# Patient Record
Sex: MALE | Race: WHITE | NOT HISPANIC OR LATINO | Employment: OTHER
[De-identification: names, ages, dates, MRNs, and addresses within clinical notes are randomized per-mention and may not be internally consistent; named-entity substitution may affect disease eponyms.]

---

## 2017-05-26 ENCOUNTER — HEALTH MAINTENANCE LETTER (OUTPATIENT)
Age: 22
End: 2017-05-26

## 2019-09-21 ENCOUNTER — HOSPITAL ENCOUNTER (EMERGENCY)
Facility: CLINIC | Age: 24
Discharge: HOME | End: 2019-09-21
Attending: EMERGENCY MEDICINE | Admitting: EMERGENCY MEDICINE
Payer: MEDICAID

## 2019-09-21 VITALS
DIASTOLIC BLOOD PRESSURE: 83 MMHG | TEMPERATURE: 97.9 F | RESPIRATION RATE: 16 BRPM | OXYGEN SATURATION: 100 % | SYSTOLIC BLOOD PRESSURE: 119 MMHG

## 2019-09-21 DIAGNOSIS — F41.1 GENERALIZED ANXIETY DISORDER: ICD-10-CM

## 2019-09-21 DIAGNOSIS — F20.9 SCHIZOPHRENIA, UNSPECIFIED TYPE (H): ICD-10-CM

## 2019-09-21 LAB
AMPHETAMINES UR QL: NOT DETECTED NG/ML
BARBITURATES UR QL SCN: NOT DETECTED NG/ML
BENZODIAZ UR QL SCN: NOT DETECTED NG/ML
BUPRENORPHINE UR QL: NOT DETECTED NG/ML
CANNABINOIDS UR QL: NOT DETECTED NG/ML
COCAINE UR QL SCN: NOT DETECTED NG/ML
D-METHAMPHET UR QL: NOT DETECTED NG/ML
METHADONE UR QL SCN: NOT DETECTED NG/ML
OPIATES UR QL SCN: NOT DETECTED NG/ML
OXYCODONE UR QL SCN: NOT DETECTED NG/ML
PCP UR QL SCN: NOT DETECTED NG/ML
PROPOXYPH UR QL: NOT DETECTED NG/ML
TRICYCLICS UR QL SCN: NOT DETECTED NG/ML

## 2019-09-21 PROCEDURE — 99284 EMERGENCY DEPT VISIT MOD MDM: CPT | Mod: Z6 | Performed by: EMERGENCY MEDICINE

## 2019-09-21 PROCEDURE — 99285 EMERGENCY DEPT VISIT HI MDM: CPT | Mod: 25 | Performed by: EMERGENCY MEDICINE

## 2019-09-21 PROCEDURE — 80306 DRUG TEST PRSMV INSTRMNT: CPT | Performed by: EMERGENCY MEDICINE

## 2019-09-21 PROCEDURE — 90791 PSYCH DIAGNOSTIC EVALUATION: CPT

## 2019-09-21 RX ORDER — LORAZEPAM 1 MG/1
1 TABLET ORAL AT BEDTIME
Qty: 7 TABLET | Refills: 0 | Status: SHIPPED | OUTPATIENT
Start: 2019-09-21 | End: 2019-09-24

## 2019-09-21 RX ORDER — RISPERIDONE 1 MG/1
1 TABLET ORAL 2 TIMES DAILY
Qty: 60 TABLET | Refills: 0 | Status: SHIPPED | OUTPATIENT
Start: 2019-09-21 | End: 2019-11-20

## 2019-09-21 ASSESSMENT — ENCOUNTER SYMPTOMS
FEVER: 0
ABDOMINAL PAIN: 0
SHORTNESS OF BREATH: 0

## 2019-09-21 NOTE — ED NOTES
Patient took his Vraylar 1.5 mg tablet in front of Provider.  Meds secured with other patient belongings.

## 2019-09-21 NOTE — ED PROVIDER NOTES
"  History     Chief Complaint   Patient presents with     Hallucinations     HPI  Joo Serrato is a 24 year old male who presents for mental health assessment.  The patient reports that he has been hearing voices.  Most of the voices are directed towards being very negative towards him and \"beat him down\".  At times they are advising him to harm himself.  He started having difficulty with anxiety with auditory hallucinations in 2016.  His mother Kathia who is in attendance reports that there is a strong family history for depression, bipolar disorder, schizophrenia.  These conditions affect multiple siblings of Odell.  The patient has been seen recently in [facility].  This was on September 16.  During that visit he also reported intense mood swings eventually discharged from the emergency department with instructions to follow-up with primary care provider in [place] and seek outpatient counseling.    Additional stressors include patient recently moving from [place] about 3 weeks ago.  On the way home he actually stopped in a hospital in [place] they were concerned about psychoses and started him on Cariprazine (second generation atypical antipsychotic).  He has been taking a dose of 1.5 mg daily.  He was never advised to increase to therapeutic dosing of 3 mg daily.  He states that there is been a slight improvement with diminished urges for of self-harm.  Has had no intolerance to medication.  He has been compliant taking it daily.  Patient does occasionally use marijuana.  He is used it twice in the last 3weeks.  Uses no other drugs.  The patient has had no prior inpatient psychiatric care.  He has had no attempts of suicide.  Patient reports that he is seeking treatment.  Prefer outpatient treatment as he now has an appointment starting on Monday as .  He believes being unemployed is also had considerable stress.  Is currently living with his mother on a temporary " basis.    Allergies:  Allergies   Allergen Reactions     No Known Drug Allergies        Problem List:    Patient Active Problem List    Diagnosis Date Noted     Sprain of medial collateral ligament of knee 10/21/2013     Priority: Medium     Encopresis 01/29/2004     Priority: Medium     Problem list name updated by automated process. Provider to review       Urinary incontinence 01/29/2004     Priority: Medium     Problem list name updated by automated process. Provider to review          Past Medical History:    History reviewed. No pertinent past medical history.    Past Surgical History:    Past Surgical History:   Procedure Laterality Date     TONSILLECTOMY & ADENOIDECTOMY  02/13/2007       Family History:    History reviewed. No pertinent family history.    Social History:  Marital Status:  Single [1]  Social History     Tobacco Use     Smoking status: Current Every Day Smoker     Packs/day: 1.00     Smokeless tobacco: Never Used   Substance Use Topics     Alcohol use: No     Drug use: No        Medications:      Acetaminophen (TYLENOL PO)   IBUPROFEN PO   loratadine (CLARITIN) 10 MG tablet         Review of Systems   Constitutional: Negative for fever.   Respiratory: Negative for shortness of breath.    Cardiovascular: Negative for chest pain.   Gastrointestinal: Negative for abdominal pain.   All other systems reviewed and are negative.      Physical Exam   BP: 119/83  Heart Rate: 95  Temp: 97.9  F (36.6  C)  Resp: 16  SpO2: 100 %      Physical Exam   Constitutional: He is oriented to person, place, and time. He appears well-developed and well-nourished. No distress.   HENT:   Head: Normocephalic and atraumatic.   Eyes: No scleral icterus.   Neck: Normal range of motion. Neck supple.   Cardiovascular: Normal rate.   Pulmonary/Chest: Effort normal.   Neurological: He is alert and oriented to person, place, and time.   Skin: Skin is warm and dry. No rash noted. He is not diaphoretic.   Psychiatric: His speech  "is normal and behavior is normal. Thought content normal. His mood appears anxious. His affect is not angry and not blunt. He is not agitated and not actively hallucinating. Thought content is not paranoid. Cognition and memory are normal. He expresses no suicidal plans and no homicidal plans. He is attentive.   Nursing note and vitals reviewed.      ED Course        Procedures          Laboratory work completed during his [facility] emergency room visit 9/16/2019: showed troponin normal, EtOH negative, comprehensive metabolic panel was entirely normal.  CBC normal.  TSH normal.  Urine toxicology screen for amphetamines, methamphetamines, cocaine, methadone, opiates, tricyclics, oxycodone was negative.  Drug screen was positive for metabolites of marijuana.    Assessments & Plan (with Medical Decision Making)  Shayan is 24 years of age.  Since for assessment of auditory hallucinations.  Patient reports first noticing hallucinations back in 2016 during time of stress when he was going through trade school.  He has dealt with various intensity of auditory hallucinations off and on since that time.  In recent months he is been having much more profound auditory hallucinations are impacting sleep, work performance quality of life.  They state that there are voices that are constantly \"beating him down\".  He also has had voices recently returned that are prompting him to hurt himself.  He has a strong family history with multiple siblings and parent affected with schizophrenia/depression-schizoaffective disorder.  The patient in the last month was started on antipsychotic atypical second generation (Vraylar 1.5mg daily).   Patient reports he has been compliant with his medication.  Has noticed improvement but continues hearing voices.  He was not advised to increase his dose to the start of therapeutic dosing range of 3 mg daily.  Plan I spoke with Laura from DEC complete a formal psychiatric evaluation.  In " interviewing the patient he is very pleasant, cooperative and states he has no suicide plan.  He also is looking forward to starting employment as  on Monday.  He states that will be a stress reliever because he has been unemployed last month living with his mother since moving from [place] to [place].  If DEC confirms that he can be discharged home will arrange for him to follow with primary care provider.  He is working on trying to get insurance coverage.  Advised him to increase his Vraylar  to 3 mg daily.  11:59 AM  After speaking with the clinical psychologist patient be discharged home.  Is not sleeping so we will provide Ativan 1 mg #7.  He knows that this is addictive and we will not provide this long-term.  His medication that he currently is taking Vraylar is over $1500 per month.  Patient has no insurance.  Recommend transitioning to Risperidone 1 mg twice daily.  The cost of this is less than $100 per month.     I have reviewed the nursing notes.    I have reviewed the findings, diagnosis, plan and need for follow up with the patient.      New Prescriptions    No medications on file       Final diagnoses:   Schizophrenia, unspecified type (H)   Generalized anxiety disorder       9/21/2019   [facility] EMERGENCY DEPARTMENT     Stone Downey,   09/21/19 1200

## 2019-09-21 NOTE — DISCHARGE INSTRUCTIONS
As part of your discharge planning we are making arrangements for you to see a new primary clinic provider at the [facility].  This will be scheduled for early to mid next week.  Please keep this appointment.  For generalized anxiety been given a prescription for Ativan 1 mg.  This can be taken at nighttime if anxiety is preventing sleep.  You have only been given sufficient quantity for 1 week because this medication is addictive.  Your current medication that you are using for hallucinations/psychosis/schizophrenia is very expensive and I would recommend that she transition to Risperidone 1 mg twice daily.  The cost for this is less than $100 per month.     Your appointment is with Dr. Gutierrez at 3:00 on Tuesday at the [facility]

## 2019-09-21 NOTE — ED NOTES
Kai from the DEC called to find out if patient is cleared medically and ready to participate. Per MD yes to both.

## 2019-09-21 NOTE — ED AVS SNAPSHOT
[facility] Emergency Department  [address] [place] [address]  Phone:  [phone]  Fax:  [phone]                                    Joo Serrato   MRN: [id number]    Department:  [facility] Emergency Department   Date of Visit:  9/21/2019           After Visit Summary Signature Page    I have received my discharge instructions, and my questions have been answered. I have discussed any challenges I see with this plan with the nurse or doctor.    ..........................................................................................................................................  Patient/Patient Representative Signature      ..........................................................................................................................................  Patient Representative Print Name and Relationship to Patient    ..................................................               ................................................  Date                                   Time    ..........................................................................................................................................  Reviewed by Signature/Title    ...................................................              ..............................................  Date                                               Time          22EPIC Rev 08/18

## 2019-09-21 NOTE — ED TRIAGE NOTES
"Here with his Mom and reports \"I'm going insane.\"  Mom reports he has been hearing voices and they are telling him to do terrible things.  "

## 2019-09-24 ENCOUNTER — OFFICE VISIT (OUTPATIENT)
Dept: FAMILY MEDICINE | Facility: OTHER | Age: 24
End: 2019-09-24
Payer: MEDICAID

## 2019-09-24 VITALS
BODY MASS INDEX: 27.72 KG/M2 | HEIGHT: 71 IN | SYSTOLIC BLOOD PRESSURE: 120 MMHG | HEART RATE: 120 BPM | TEMPERATURE: 97.3 F | OXYGEN SATURATION: 96 % | WEIGHT: 198 LBS | DIASTOLIC BLOOD PRESSURE: 80 MMHG | RESPIRATION RATE: 18 BRPM

## 2019-09-24 DIAGNOSIS — F19.982 SLEEP PROBLEM CAUSED BY DRUG (H): Primary | ICD-10-CM

## 2019-09-24 DIAGNOSIS — R44.0 AUDITORY HALLUCINATION: ICD-10-CM

## 2019-09-24 PROCEDURE — 99203 OFFICE O/P NEW LOW 30 MIN: CPT | Performed by: FAMILY MEDICINE

## 2019-09-24 RX ORDER — LORAZEPAM 1 MG/1
1 TABLET ORAL AT BEDTIME
Qty: 10 TABLET | Refills: 0 | Status: SHIPPED | OUTPATIENT
Start: 2019-09-24 | End: 2020-10-02

## 2019-09-24 RX ORDER — TRAZODONE HYDROCHLORIDE 100 MG/1
100 TABLET ORAL AT BEDTIME
Qty: 60 TABLET | Refills: 0 | Status: SHIPPED | OUTPATIENT
Start: 2019-09-24 | End: 2019-12-02

## 2019-09-24 ASSESSMENT — PAIN SCALES - GENERAL: PAINLEVEL: NO PAIN (0)

## 2019-09-24 ASSESSMENT — MIFFLIN-ST. JEOR: SCORE: 1919.21

## 2019-09-24 NOTE — PROGRESS NOTES
"Subjective     ED/UC Followup:    Facility:  [facility]  Date of visit: 9/21/2019  Reason for visit: Schizophrenia , anxiety   Current Status: better         Joo is a 24 year old male who comes to clinic for follow-up.  He has been suffering auditory hallucinations that he describes as his own internal voice that is getting louder and more demanding.  No homicidal or suicidal tendencies.  He was seen in our ED several days ago and placed on risperidone which she is tolerating well.  This was his second visit to an ED.  The original was placed on VraYLAR.  But was too expensive and stopped.  Currently living his mother.  He is starting work soon.  He is stressed about his finances and excited about that.  He has not had psychiatric follow-up.  His auditory hallucinations have nearly resolved.      Review of Systems   ROS COMP: Constitutional, HEENT, cardiovascular, pulmonary, gi and gu systems are negative, except as otherwise noted.      Objective    /80   Pulse 120   Temp 97.3  F (36.3  C) (Temporal)   Resp 18   Ht 1.81 m (5' 11.25\")   Wt 89.8 kg (198 lb)   SpO2 96%   BMI 27.42 kg/m       Wt Readings from Last 2 Encounters:   09/24/19 89.8 kg (198 lb)   12/31/13 110.7 kg (244 lb 1.6 oz) (>99 %)*     * Growth percentiles are based on CDC (Boys, 2-20 Years) data.       Physical Exam   Well-appearing and in no distress. Mood \"okay\". Affect, mentation appropriate. Insight and judgment intact. speech normal rate and content. No evidence of neyda, SI, HI. No psychosis.            Assessment & Plan       ICD-10-CM    1. 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 Auditory hallucination R44.0 traZODone (DESYREL) 100 MG tablet     MENTAL HEALTH REFERRAL  - Adult; Psychiatry and Medication Management; Psychiatry; [facility]: [facility] Psychiatry Service [phone].  Medication management & future refills will be returned to [facility] PCP upon completion of evaluation; We samir... "       Sleep is improved.  He can stop his lorazepam.  Hallucinations have improved we will continue his risperidone.  I gave him a small prescription of trazodone to use if his sleep issues recur.  Referral was sent for local psychiatrist for clarification of his diagnosis.  I can see him back in a month, sooner as needed    Everett Gutierrez MD  [facility]

## 2019-11-19 DIAGNOSIS — F19.982 SLEEP PROBLEM CAUSED BY DRUG (H): ICD-10-CM

## 2019-11-19 DIAGNOSIS — R44.0 AUDITORY HALLUCINATION: Primary | ICD-10-CM

## 2019-11-20 RX ORDER — LORAZEPAM 1 MG/1
1 TABLET ORAL AT BEDTIME
Qty: 10 TABLET | Refills: 0 | OUTPATIENT
Start: 2019-11-20

## 2019-11-20 RX ORDER — RISPERIDONE 1 MG/1
1 TABLET ORAL 2 TIMES DAILY
Qty: 60 TABLET | Refills: 0 | Status: SHIPPED | OUTPATIENT
Start: 2019-11-20 | End: 2019-12-02

## 2019-11-20 NOTE — TELEPHONE ENCOUNTER
Pending Prescriptions Disp Refills     LORazepam (ATIVAN) 1 MG tablet 10 tablet 0     Sig: Take 1 tablet (1 mg) by mouth At Bedtime       There is no refill protocol information for this order   Last Written Prescription Date:  9/24/2019  Last Fill Quantity: 10,  # refills: 0   Last office visit: 9/24/2019 with prescribing provider:     Future Office Visit:    Sleep problem caused by drug (H) [F19.982]  - Primary     Routing refill request to provider for review/approval because:  Drug not on the [facility] refill protocol       risperiDONE (RISPERDAL) 1 MG tablet 60 tablet 0    Sig: Take 1 tablet (1 mg) by mouth 2 times daily   Routing refill request to provider for review/approval because:  Labs not current:  Lipid panel, CBC, A1c or Glucose        Cornelia Reed RN

## 2019-11-20 NOTE — TELEPHONE ENCOUNTER
Requested Prescriptions   Pending Prescriptions Disp Refills     LORazepam (ATIVAN) 1 MG tablet 10 tablet 0     Sig: Take 1 tablet (1 mg) by mouth At Bedtime       There is no refill protocol information for this order   Routing refill request to provider for review/approval              risperiDONE (RISPERDAL) 1 MG tablet 60 tablet 0     Sig: Take 1 tablet (1 mg) by mouth 2 times daily   Last Written Prescription Date:  9/21/2019  Last Fill Quantity: 60,  # refills: 0   Last office visit: 9/24/2019 with prescribing provider:  Brenda   Future Office Visit:        Antipsychotic Medications Failed - 11/19/2019 10:44 AM        Failed - Lipid panel on file within the past 12 months     No lab results found.        Failed - CBC on file in past 12 months     No lab results found.        Failed - A1c or Glucose on file in past 12 months     No lab results found.    Please review patients last 3 weights. If a weight gain of >10 lbs exists, you may refill the prescription once after instructing the patient to schedule an appointment within the next 30 days.    Wt Readings from Last 3 Encounters:   09/24/19 89.8 kg (198 lb)   12/31/13 110.7 kg (244 lb 1.6 oz) (>99 %)*   10/16/13 105.7 kg (233 lb 1.6 oz) (99 %)*     * Growth percentiles are based on CDC (Boys, 2-20 Years) data.           Passed - Blood pressure under 140/90 in past 12 months     BP Readings from Last 3 Encounters:   09/24/19 120/80   09/21/19 119/83   07/28/14 128/75           Passed - Patient is 12 years of age or older        Passed - Heart Rate on file within past 12 months     Pulse Readings from Last 3 Encounters:   09/24/19 120   07/28/14 98   12/31/13 76           Passed - Medication is active on med list        Passed - Recent (6 mo) or future (30 days) visit within the authorizing provider's specialty     Patient had office visit in the last 6 months or has a visit in the next 30 days with authorizing provider or within the authorizing provider's  "specialty.  See \"Patient Info\" tab in inbasket, or \"Choose Columns\" in Meds & Orders section of the refill encounter.          Routing refill request to provider for review/approval   Cornelia Reed RN        "

## 2019-11-21 NOTE — TELEPHONE ENCOUNTER
Everett Gutierrez MD  [facility] 15 hours ago (4:28 PM)      Needs to see psych, please help schedule. Referral is in    Routing comment      Pt will get a call to schedule the appt.   Lima Yeager MA

## 2019-12-02 ENCOUNTER — OFFICE VISIT (OUTPATIENT)
Dept: FAMILY MEDICINE | Facility: OTHER | Age: 24
End: 2019-12-02
Payer: MEDICAID

## 2019-12-02 VITALS
BODY MASS INDEX: 27.22 KG/M2 | HEART RATE: 84 BPM | WEIGHT: 201 LBS | TEMPERATURE: 97.6 F | DIASTOLIC BLOOD PRESSURE: 86 MMHG | SYSTOLIC BLOOD PRESSURE: 114 MMHG | HEIGHT: 72 IN | RESPIRATION RATE: 16 BRPM

## 2019-12-02 DIAGNOSIS — F19.982 SLEEP PROBLEM CAUSED BY DRUG (H): ICD-10-CM

## 2019-12-02 DIAGNOSIS — R44.0 AUDITORY HALLUCINATION: ICD-10-CM

## 2019-12-02 PROCEDURE — 99213 OFFICE O/P EST LOW 20 MIN: CPT | Performed by: FAMILY MEDICINE

## 2019-12-02 RX ORDER — TRAZODONE HYDROCHLORIDE 100 MG/1
100 TABLET ORAL AT BEDTIME
Qty: 30 TABLET | Refills: 0 | Status: SHIPPED | OUTPATIENT
Start: 2019-12-02 | End: 2019-12-10

## 2019-12-02 RX ORDER — LORAZEPAM 1 MG/1
1 TABLET ORAL AT BEDTIME
Qty: 10 TABLET | Refills: 0 | Status: CANCELLED | OUTPATIENT
Start: 2019-12-02

## 2019-12-02 RX ORDER — RISPERIDONE 1 MG/1
1 TABLET ORAL 2 TIMES DAILY
Qty: 60 TABLET | Refills: 0 | Status: SHIPPED | OUTPATIENT
Start: 2019-12-02 | End: 2019-12-10

## 2019-12-02 ASSESSMENT — PATIENT HEALTH QUESTIONNAIRE - PHQ9
SUM OF ALL RESPONSES TO PHQ QUESTIONS 1-9: 21
SUM OF ALL RESPONSES TO PHQ QUESTIONS 1-9: 21
10. IF YOU CHECKED OFF ANY PROBLEMS, HOW DIFFICULT HAVE THESE PROBLEMS MADE IT FOR YOU TO DO YOUR WORK, TAKE CARE OF THINGS AT HOME, OR GET ALONG WITH OTHER PEOPLE: EXTREMELY DIFFICULT

## 2019-12-02 ASSESSMENT — ANXIETY QUESTIONNAIRES
3. WORRYING TOO MUCH ABOUT DIFFERENT THINGS: NEARLY EVERY DAY
5. BEING SO RESTLESS THAT IT IS HARD TO SIT STILL: NEARLY EVERY DAY
7. FEELING AFRAID AS IF SOMETHING AWFUL MIGHT HAPPEN: NEARLY EVERY DAY
6. BECOMING EASILY ANNOYED OR IRRITABLE: NEARLY EVERY DAY
GAD7 TOTAL SCORE: 18
4. TROUBLE RELAXING: MORE THAN HALF THE DAYS
2. NOT BEING ABLE TO STOP OR CONTROL WORRYING: MORE THAN HALF THE DAYS
GAD7 TOTAL SCORE: 18
GAD7 TOTAL SCORE: 18
1. FEELING NERVOUS, ANXIOUS, OR ON EDGE: MORE THAN HALF THE DAYS
7. FEELING AFRAID AS IF SOMETHING AWFUL MIGHT HAPPEN: NEARLY EVERY DAY

## 2019-12-02 ASSESSMENT — MIFFLIN-ST. JEOR: SCORE: 1942.98

## 2019-12-02 NOTE — ASSESSMENT & PLAN NOTE
Patient is a pleasant 24-year-old with history of auditory hallucinations for the past 3 to 4 months was evaluated at the emergency department twice in the last month and was eventually started on Risperdal which seem to have helped his symptoms.  He was not able to follow-up with psychiatry due to lack of insurance but now has insurance and would like to establish with psychiatry.  He has been out of medications for the past 2 weeks which has made his symptoms worse.  Advised him to restart Risperdal and trazodone.  I will not be providing any refills on Ativan until diagnosis has been established by psychiatry.  He denies any active suicidal or homicidal thoughts.  Discussed crisis plan.

## 2019-12-02 NOTE — PROGRESS NOTES
Subjective     Joo Serrato is a 24 year old male who presents to clinic today for the following health issues:    History of Present Illness        Mental Health Follow-up:  Patient presents to follow-up on Depression & Anxiety.Patient's depression since last visit has been:  Good  The patient is having other symptoms associated with depression.  Patient's anxiety since last visit has been:  No change  The patient is having other symptoms associated with anxiety.  Any significant life events: job concerns  Patient is feeling anxious or having panic attacks.  Patient has no concerns about alcohol or drug use.     Social History  Tobacco Use    Smoking status: Current Every Day Smoker      Packs/day: 1.00    Smokeless tobacco: Never Used  Alcohol use: No  Drug use: No      Today's PHQ-9         PHQ-9 Total Score:     (P) 21   PHQ-9 Q9 Thoughts of better off dead/self-harm past 2 weeks :   (P) More than half the days   Thoughts of suicide or self harm:  (P) No   Self-harm Plan:        Self-harm Action:          Safety concerns for self or others: (P) No         He eats 0-1 servings of fruits and vegetables daily.He consumes 0 sweetened beverage(s) daily.  He is taking medications regularly.     DepressAnswers for HPI/ROS submitted by the patient on 12/2/2019   Chronic problems general questions HPI Form  If you checked off any problems, how difficult have these problems made it for you to do your work, take care of things at home, or get along with other people?: Extremely difficult  PHQ9 TOTAL SCORE: 21  NICO 7 TOTAL SCORE: 18ion and Anxiety Follow-Up    How are you doing with your depression since your last visit? No change    How are you doing with your anxiety since your last visit?  No change    Are you having other symptoms that might be associated with depression or anxiety? No    Have you had a significant life event? Job Concerns     Do you have any concerns with your use of alcohol or other drugs?  No    Social History     Tobacco Use     Smoking status: Current Every Day Smoker     Packs/day: 1.00     Smokeless tobacco: Never Used   Substance Use Topics     Alcohol use: No     Drug use: No     PHQ 12/2/2019   PHQ-9 Total Score 21   Q9: Thoughts of better off dead/self-harm past 2 weeks More than half the days   F/U: Thoughts of suicide or self-harm No   F/U: Safety concerns No     NICO-7 SCORE 12/2/2019   Total Score 18 (severe anxiety)   Total Score 18     Last PHQ-9 12/2/2019   1.  Little interest or pleasure in doing things 3   2.  Feeling down, depressed, or hopeless 2   3.  Trouble falling or staying asleep, or sleeping too much 2   4.  Feeling tired or having little energy 2   5.  Poor appetite or overeating 3   6.  Feeling bad about yourself 2   7.  Trouble concentrating 3   8.  Moving slowly or restless 2   Q9: Thoughts of better off dead/self-harm past 2 weeks 2   PHQ-9 Total Score 21   In the past two weeks have you had thoughts of suicide or self harm? No   Do you have concerns about your personal safety or the safety of others? No     In the past two weeks have you had thoughts of suicide or self-harm?  No.    Do you have concerns about your personal safety or the safety of others?   No    Suicide Assessment Five-step Evaluation and Treatment (SAFE-T)      How many servings of fruits and vegetables do you eat daily?  0-1  On average, how many sweetened beverages do you drink each day (Examples: soda, juice, sweet tea, etc.  Do NOT count diet or artificially sweetened beverages)?   0    Reviewed and updated as needed this visit by Provider  Tobacco  Allergies  Meds  Problems  Med Hx  Surg Hx  Fam Hx         Review of Systems   ROS COMP: Constitutional, HEENT, cardiovascular, pulmonary, GI, , musculoskeletal, neuro, skin, endocrine and psych systems are negative, except as otherwise noted.      Objective    /86   Pulse 84   Temp 97.6  F (36.4  C) (Temporal)   Resp 16   Ht 1.826 m  "(5' 11.89\")   Wt 91.2 kg (201 lb)   BMI 27.34 kg/m    Body mass index is 27.34 kg/m .  Physical Exam  Constitutional:       Appearance: Normal appearance.   HENT:      Head: Normocephalic and atraumatic.      Nose: Nose normal.   Neck:      Musculoskeletal: Normal range of motion and neck supple.   Cardiovascular:      Rate and Rhythm: Normal rate and regular rhythm.      Pulses: Normal pulses.      Heart sounds: Normal heart sounds. No murmur. No friction rub. No gallop.    Pulmonary:      Effort: Pulmonary effort is normal.      Breath sounds: Normal breath sounds.   Neurological:      General: No focal deficit present.      Mental Status: He is alert and oriented to person, place, and time.   Psychiatric:         Mood and Affect: Mood normal.         Behavior: Behavior normal.         Thought Content: Thought content normal.         Judgment: Judgment normal.            Diagnostic Test Results:  Labs reviewed in Epic        Assessment & Plan   Problem List Items Addressed This Visit     Auditory hallucination     Patient is a pleasant 24-year-old with history of auditory hallucinations for the past 3 to 4 months was evaluated at the emergency department twice in the last month and was eventually started on Risperdal which seem to have helped his symptoms.  He was not able to follow-up with psychiatry due to lack of insurance but now has insurance and would like to establish with psychiatry.  He has been out of medications for the past 2 weeks which has made his symptoms worse.  Advised him to restart Risperdal and trazodone.  I will not be providing any refills on Ativan until diagnosis has been established by psychiatry.  He denies any active suicidal or homicidal thoughts.  Discussed crisis plan.           Relevant Medications    risperiDONE (RISPERDAL) 1 MG tablet    traZODone (DESYREL) 100 MG tablet    Other Relevant Orders    MENTAL HEALTH REFERRAL  - Adult; Psychiatry and Medication Management; " "Psychiatry; [facility]: [facility] Psychiatry Service [phone].  Medication management & future refills will be returned to [facility] PCP upon completion of evaluation; We samir...      Other Visit Diagnoses     Sleep problem caused by drug (H)                 Tobacco Cessation:   reports that he has been smoking. He has been smoking about 1.00 pack per day. He has never used smokeless tobacco.        BMI:   Estimated body mass index is 27.34 kg/m  as calculated from the following:    Height as of this encounter: 1.826 m (5' 11.89\").    Weight as of this encounter: 91.2 kg (201 lb).           See Patient Instructions  Return in about 3 months (around 3/2/2020).    Hannah Santamaria MD  [facility]        "

## 2019-12-03 ASSESSMENT — PATIENT HEALTH QUESTIONNAIRE - PHQ9: SUM OF ALL RESPONSES TO PHQ QUESTIONS 1-9: 21

## 2019-12-03 ASSESSMENT — ANXIETY QUESTIONNAIRES: GAD7 TOTAL SCORE: 18

## 2019-12-10 ENCOUNTER — OFFICE VISIT (OUTPATIENT)
Dept: PSYCHIATRY | Facility: CLINIC | Age: 24
End: 2019-12-10
Payer: MEDICAID

## 2019-12-10 VITALS
OXYGEN SATURATION: 97 % | TEMPERATURE: 98.6 F | SYSTOLIC BLOOD PRESSURE: 124 MMHG | WEIGHT: 208.8 LBS | RESPIRATION RATE: 14 BRPM | HEART RATE: 88 BPM | DIASTOLIC BLOOD PRESSURE: 62 MMHG | BODY MASS INDEX: 28.41 KG/M2

## 2019-12-10 DIAGNOSIS — F20.9 SCHIZOPHRENIA, UNSPECIFIED TYPE (H): Primary | ICD-10-CM

## 2019-12-10 PROCEDURE — 90792 PSYCH DIAG EVAL W/MED SRVCS: CPT | Performed by: NURSE PRACTITIONER

## 2019-12-10 RX ORDER — TRAZODONE HYDROCHLORIDE 100 MG/1
100 TABLET ORAL AT BEDTIME
Qty: 30 TABLET | Refills: 2 | Status: ON HOLD | OUTPATIENT
Start: 2019-12-10 | End: 2020-10-03

## 2019-12-10 RX ORDER — RISPERIDONE 1 MG/1
1 TABLET ORAL 2 TIMES DAILY
Qty: 90 TABLET | Refills: 2 | Status: ON HOLD | OUTPATIENT
Start: 2019-12-10 | End: 2020-10-03

## 2019-12-10 ASSESSMENT — PATIENT HEALTH QUESTIONNAIRE - PHQ9
SUM OF ALL RESPONSES TO PHQ QUESTIONS 1-9: 11
5. POOR APPETITE OR OVEREATING: NEARLY EVERY DAY

## 2019-12-10 ASSESSMENT — ANXIETY QUESTIONNAIRES
GAD7 TOTAL SCORE: 16
IF YOU CHECKED OFF ANY PROBLEMS ON THIS QUESTIONNAIRE, HOW DIFFICULT HAVE THESE PROBLEMS MADE IT FOR YOU TO DO YOUR WORK, TAKE CARE OF THINGS AT HOME, OR GET ALONG WITH OTHER PEOPLE: SOMEWHAT DIFFICULT
7. FEELING AFRAID AS IF SOMETHING AWFUL MIGHT HAPPEN: MORE THAN HALF THE DAYS
5. BEING SO RESTLESS THAT IT IS HARD TO SIT STILL: MORE THAN HALF THE DAYS
2. NOT BEING ABLE TO STOP OR CONTROL WORRYING: MORE THAN HALF THE DAYS
3. WORRYING TOO MUCH ABOUT DIFFERENT THINGS: MORE THAN HALF THE DAYS
1. FEELING NERVOUS, ANXIOUS, OR ON EDGE: NEARLY EVERY DAY
6. BECOMING EASILY ANNOYED OR IRRITABLE: MORE THAN HALF THE DAYS

## 2019-12-10 ASSESSMENT — PAIN SCALES - GENERAL: PAINLEVEL: NO PAIN (0)

## 2019-12-10 NOTE — PROGRESS NOTES
"                                                         Outpatient Psychiatric Evaluation - Standard Adult    Name:  Joo Serrato  : 1995    Source of Referral:  Primary Care Provider: Hannah Santamaria MD   Last visit: 2019  Current Psychotherapist: None Currently    Identifying Data:  Patient is a 24 year old, single  White American male  who presents for initial visit with me.  Patient is currently employed full time. Patient attended the session with rowena , who they agreed to have interview with. Consent to communicate signed for Rowena patient's Mother. Consent for treatment signed and included in electronic medical record. Discussed limits of confidentiality today. My Practice Policy was reviewed and signed.     Patient prefers to be called: Joo     Chief Complaint:  \"I got to do it to continue getting my medications.\"    HPI:  Patient was initially referred to psychiatry in September.  It appears another referral was made in December.   History of auditory hallucinations for the past 3 to 4 months was evaluated at the emergency department in September and was started on Risperdal which seems to have helped his symptoms.  Apparently he was not able to follow-up with psychiatry due to lack of insurance but now has insurance.  He was seen by PCP on , at that time reported he had been out of medications for the past 2 weeks which has made his symptoms worse.  He was restarted on Risperdal and is currently taking Risperdal 1mg twice daily.     Patient denies history of mental illness.  He reports she started experiencing anxiety, paranoia, and auditory hallucinations in .  Reports voices are often negative towards him and \"beat him down\" and command in nature (telling him to harm himself).   In 2019 he moved from [place] to [place].  On the way he stopped in a hospital in [place] due to psychoses, he was started on Cariprazine/Vrylar 1.5mg.   The patient was then " "seen at [facility] on September 16.  During that visit he also reported intense mood swings eventually discharged from the emergency department.  Patient states \"I was losing my mind.\" He denies drug or alcohol use.  UDS obtained during ED visit was negative.  He reports he has been drinking since age 20 but stopped when hallucinations started.  He states he has lost two jobs, one in [place] one here in [place].  States at times experiences paranoia, \"throws me off.\" He does utilize PRN ativan.  He reports he has not experienced hallucinations for past two week.  Some ongoing paranoia although \"not too much anymore.\"  Denies depressed mood or neyda, although does seem to erica several items on PHQ9 and MDQ.  He denies decreased need for sleep, increased speech, impulsivity, increased goal directed behaviors, or irritability.    Discussed alternative treatments.      Psychiatric Review of Symptoms:  Depression: Interest: Decrease  Depressed Mood  Concentration: Decrease  Psychomotor slowing   Worthless: Increase    PHQ-9 scores:   PHQ-9 SCORE 12/2/2019 12/10/2019   PHQ-9 Total Score MyChart 21 (Severe depression) -   PHQ-9 Total Score 21 11     Neyda:  No symptoms   MDQ Score: Negative Screen  Anxiety: Feeling nervous, anxious, or on edge  Uncontrolled worrying  Worrying too much about different things  Trouble relaxing  Restlessness  Thoughts of impending doom    NICO-7 scores:    NICO-7 SCORE 12/2/2019 12/10/2019   Total Score 18 (severe anxiety) -   Total Score 18 16     Panic:  No symptoms recently, paranoia.  Agoraphobia:  No   PTSD:  No symptoms   OCD:  No symptoms   Psychosis: No symptoms   ADD / ADHD: No symptoms  Gambling or shoplifting: No   Eating Disorder:  No symptoms   Sleep:   No symptoms     Psychiatric History:     Hospitalizations: None  History of Commitment? No   Past Treatment: medication(s) from physician / PCP - Auditory hallucinations started in June.   Suicide Attempts: No   Self-injurious Behavior: " Denies  Electroconvulsive Therapy (ECT) or Transcranial Magnetic Stimulation (TMS): No   Genetic Testing: No     Substance Use History:  Social History     Tobacco Use     Smoking status: Current Every Day Smoker     Packs/day: 1.00     Smokeless tobacco: Current User     Types: Chew   Substance Use Topics     Alcohol use: No      Current/History use of drugs: Patient states that he use to drink a lot and would use marijuana occasionally. Patient currently smokes tobacco  Patient reports no problems as a result of their drinking / drug use.   Based on the clinical interview, there  are not indications of drug or alcohol abuse.  Caffeine:  Yes  1 energy drinks/day  Patient has not received chemical dependency treatment in the past  Recovery Programming Involvement: None    Past Medical History:  History reviewed. No pertinent past medical history.   Surgery:   Past Surgical History:   Procedure Laterality Date     TONSILLECTOMY & ADENOIDECTOMY  02/13/2007     Allergies:     Allergies   Allergen Reactions     No Known Drug Allergies      Primary Care Provider: Physician No Ref-Primary  Seizures or Head Injury: No  Diet: No Restrictions  Food Allergies: No   Exercise: Patient reports going to gym.   Supplements: Reviewed per Electronic Medical Record Today    Acetaminophen (TYLENOL PO),   IBUPROFEN PO,   loratadine (CLARITIN) 10 MG tablet, Take 10 mg by mouth daily  LORazepam (ATIVAN) 1 MG tablet, Take 1 tablet (1 mg) by mouth At Bedtime (Patient not taking: Reported on 12/10/2019)    No current facility-administered medications on file prior to visit.      The [place] Prescription Monitoring Program has been reviewed and there are no concerns about diversionary activity for controlled substances at this time.      Vital Signs:  Vitals: /62   Pulse 88   Temp 98.6  F (37  C) (Temporal)   Resp 14   Wt 94.7 kg (208 lb 12.8 oz)   SpO2 97%   BMI 28.41 kg/m      Labs:  Admission on 09/21/2019, Discharged on  09/21/2019   Component Date Value Ref Range Status     Cannabinoids (58-qgp-9-carboxy-9-T* 09/21/2019 Not Detected  NDET^Not Detected ng/mL Final    Cutoff for a negative cannabinoid is 50 ng/mL or less.     Phencyclidine (Phencyclidine) 09/21/2019 Not Detected  NDET^Not Detected ng/mL Final    Cutoff for a negative PCP is 25 ng/mL or less.     Cocaine (Benzoylecgonine) 09/21/2019 Not Detected  NDET^Not Detected ng/mL Final    Cutoff for a negative cocaine is 150 ng/ml or less.     Methamphetamine (d-Methamphetamine) 09/21/2019 Not Detected  NDET^Not Detected ng/mL Final    Cutoff for a negative methamphetamine is 500 ng/ml or less.     Opiates (Morphine) 09/21/2019 Not Detected  NDET^Not Detected ng/mL Final    Cutoff for a negative opiate is 100 ng/ml or less.     Amphetamine (d-Amphetamine) 09/21/2019 Not Detected  NDET^Not Detected ng/mL Final    Cutoff for a negative amphetamine is 500 ng/mL or less.     Benzodiazepines (Nordiazepam) 09/21/2019 Not Detected  NDET^Not Detected ng/mL Final    Cutoff for a negative benzodiazepine is 150 ng/ml or less.     Tricyclic Antidepressants (Desipra* 09/21/2019 Not Detected  NDET^Not Detected ng/mL Final    Cutoff for a negative tricyclic antidepressant is 300 ng/ml or less.     Methadone (Methadone) 09/21/2019 Not Detected  NDET^Not Detected ng/mL Final    Cutoff for a negative methadone is 200 ng/ml or less.     Barbiturates (Butalbital) 09/21/2019 Not Detected  NDET^Not Detected ng/mL Final    Cutoff for a negative barbituate is 200 ng/ml or less.     Oxycodone (Oxycodone) 09/21/2019 Not Detected  NDET^Not Detected ng/mL Final    Cutoff for a negative Oxycodone is 100 ng/mL or less.     Propoxyphene (Norpropoxyphene) 09/21/2019 Not Detected  NDET^Not Detected ng/mL Final    Cutoff for a negative propoxyphene is 300 ng/ml or less     Buprenorphine (Buprenorphine) 09/21/2019 Not Detected  NDET^Not Detected ng/mL Final    Cutoff for a negative buprenorphine is 10 ng/ml or  "less     Most recent labs reviewed and no new labs.     Review of Systems:  10 systems (general, cardiovascular, respiratory, eyes, ENT, endocrine, GI, , M/S, neurological) were reviewed. Denies chest pain, shortness of breath, dizziness.  Denies breast enlargement, breast tenderness or discharge. The remaining systems are all unremarkable.  Family History:   Patient reported family history includes: History reviewed. No pertinent family history.  Mental Illness History: Yes: Mother, both brothers - Bipolar Both brothers have schizophrenia. Patient states \"ADHD all that stuff\" for his brothers  strong family history for depression, bipolar disorder, schizophrenia  Substance Abuse History: Yes: Mother - alcohol  Suicide History: Denies  Medications: Yes: Many family memebers are treated for mental health.      Social History:   Birth place: [place], [place]  Childhood: Family was not intact throughout childhood.  Reported in 2016, his father was in group home for five years, mother was homeless,     Siblings: 2 brothers 2 sisters Patient is the youngest out of his siblings. Siblings have all been in senior living.  Highest education level was high school graduate and some college.   Employment History:  Self employed  Childhood illnesses: Denies  Current Living situation: Patient currently lives in  [place], [place]. Feels safe at home. Patient was in [place] for about 2 months for work. But has since moved back.   Children: zero   Firearms/Weapons Access: No: Patient denies   Service: No    Mental Status Examination:     Appearance:  awake, alert, adequately groomed and appeared stated age  Attitude:  cooperative   Eye Contact:  fair  Gait and Station: Normal  Psychomotor Behavior:  no evidence of tardive dyskinesia, dystonia, or tics and intact station, gait and muscle tone  Oriented to:  time, person, and place  Attention Span and Concentration:  Normal  Speech:  clear, coherent, regular rate, regular rhythm and poverty of " speech  Mood:  Fine  Affect:  intensity is blunted  Associations:  no loose associations  Thought Process:  logical, linear and goal oriented  Thought Content:  no evidence of suicidal ideation or homicidal ideation and no auditory hallucinations present  Recent and Remote Memory:  intact Not formally assessed. No amnesia.  Fund of Knowledge: appropriate  Insight:  good  Judgment:  intact  Impulse Control:  intact    Suicide Risk Assessment:  Today Joo Serrato denies suicidal thoughts or urges to harm self. Based on all available evidence, Joo Serrato does not appear to be at imminent risk for self-harm, does not meet criteria for a 72-hr hold, and therefore remains appropriate for ongoing outpatient level of care.  A thorough assessment of risk factors related to suicide and self-harm have been reviewed and are noted above. The patient convincingly denies acute suicidality on several occasions. Local community safety resources reviewed and printed for patient to use if needed. There was no deceit detected, and the patient presented in a manner that was believable.     DSM5  Diagnosis:  295.90  (F20.9) Schizophrenia    Medical Comorbidities Include:   Patient Active Problem List    Diagnosis Date Noted     Auditory hallucination 12/02/2019     Priority: Medium     Sprain of medial collateral ligament of knee 10/21/2013     Priority: Medium     Encopresis 01/29/2004     Priority: Medium     Problem list name updated by automated process. Provider to review       Urinary incontinence 01/29/2004     Priority: Medium     Problem list name updated by automated process. Provider to review         A 12-item WHODAS 2.0 assessment was completed by the patient today and recorded in EPIC.    WHODAS 2.0 Total Score 12/10/2019   Total Score 33       The Patient Activation Measure (JOSE EDUARDO) score was completed and recorded in [employer]. This assesses patient knowledge, skill, and confidence for self-management. No  flowsheet data found.    Impression:  Joo Serrato is a 24 year old male with recent onset of schizophrenia.  There is significant family history of schizophrenia as well as Bipolar disorder.  He is currently treated with Risperdal 1mg BID.  Hallucinations appear well treated on current dose, although he has had some intermittent paranoia.  He does utilize PRN Ativan for this.  I recommend using Risperdal 1mg PRN for psychosis/paranoia in addition to scheduled dose.  Recommend obtaining baseline labs at next appointment. He appears to be doing very well on his current dose.  We did discuss alternatives such as once daily dosing, Risperdal consta or Invega, he is not interested in any of these options at this time.  I will keep him on long term for medication management given his SPMI.    Psychotic symptoms including hallucinations, paranoia, and negative symptoms started in 2018, symptoms have persistent for at least one month.    Medication side effects and alternatives reviewed. Health promotion activities recommended and reviewed today. All questions addressed. Education and counseling completed regarding risks and benefits of medications and psychotherapy options. Collaborative Care Psychiatry Service model reviewed today. Recommend therapy for additional support.     Treatment Plan:     Continue Risperdal 1mg twice daily, will also add 1mg as needed for psychosis/paranoia.    Continue trazodone 100mg at bedtime.    Continue all other medical directions per primary care provider.     Continue all other medications as reviewed per electronic medical record today.     Safety plan reviewed. To the Emergency Department as needed or call after hours crisis line at [phone] or [phone]. [place] Crisis Text Line: Text [place] to 548055  or  Suicide LifeLine Chat: suicidepreventionlifeline.org/chat/    I am willing to see you for continuous care and medications.  I would like to see you back in three  months.    Follow up with primary care provider as planned or for acute medical concerns.    Call the psychiatric nurse line with medication questions or concerns at [phone].    MyChart may be used to communicate with your provider, but this is not intended to be used for emergencies.    Crisis Resources:    National Suicide Prevention Lifeline: 687.151.9646 (TTY: 985.960.2755). Call anytime for help.  (www.suicidepreventionlifeline.org)  National [place] on Mental Illness (www.naresh.org): 702.331.2426 or 835-980-4855.   Mental Health Association (www.mentalhealth.org): 633.784.2843 or 323-054-6265.  [place] Crisis Text Line: Text [place] to 425073  Suicide LifeLine Chat: suicidepreMaryJane Distributionline.org/chat    Administrative Billing:   Time spent with patient was 60 minutes and greater than 50% of time or 40 minutes was spent in counseling and coordination of care regarding above diagnoses and treatment plan.    Patient Status:  This is a continuous care patient and medications will be prescribed by the psychiatric provider until further indicated.    Signed:   Radha Arriaza MSN, APRN, CNP  Psychiatry

## 2019-12-10 NOTE — PATIENT INSTRUCTIONS
Treatment Plan:     Continue Risperdal 1mg twice daily and 1mg as needed for psychosis/paranoia.    Continue trazodone 100mg at bedtime.    Continue all other medical directions per primary care provider.     Continue all other medications as reviewed per electronic medical record today.     Safety plan reviewed. To the Emergency Department as needed or call after hours crisis line at [phone] or [phone]. [place] Crisis Text Line: Text [place] to 154170  or  Suicide LifeLine Chat: [employer].org/chat/    I am willing to see you for continuous care and medications.  I would like to see you back in three months.    Follow up with primary care provider as planned or for acute medical concerns.    Call the psychiatric nurse line with medication questions or concerns at [phone].    TipRanks may be used to communicate with your provider, but this is not intended to be used for emergencies.    Crisis Resources:    National Suicide Prevention Lifeline: 471.788.7847 (TTY: 242.651.4778). Call anytime for help.  (www.suicidepreventionlifeline.org)  National [place] on Mental Illness (www.naresh.org): 254.415.6631 or 724-340-5376.   Mental Health Association (www.mentalhealth.org): 257.332.1298 or 199-394-7506.  [place] Crisis Text Line: Text [place] to 549091  Suicide LifeLine Chat: suicideBitWave.org/chat

## 2019-12-11 ASSESSMENT — ANXIETY QUESTIONNAIRES: GAD7 TOTAL SCORE: 16

## 2019-12-15 DIAGNOSIS — R44.0 AUDITORY HALLUCINATION: ICD-10-CM

## 2019-12-16 RX ORDER — TRAZODONE HYDROCHLORIDE 100 MG/1
TABLET ORAL
Refills: 0 | OUTPATIENT
Start: 2019-12-16

## 2020-01-15 ENCOUNTER — DOCUMENTATION ONLY (OUTPATIENT)
Dept: PSYCHIATRY | Facility: CLINIC | Age: 25
End: 2020-01-15

## 2020-01-15 DIAGNOSIS — F20.0 PARANOID SCHIZOPHRENIA (H): Primary | ICD-10-CM

## 2020-01-15 NOTE — PROGRESS NOTES
Due to psychiatric providers resignation recommend patient be scheduled with long term psychiatry. Patient was seen by psychiatric provider on 12/10/2019, medication refills were given at that time. If medication refills are needed after 2/18/2020 please refer to PCP. [facility] please contact patient to schedule an appointment with long term psychiatry.    Radha Arriaza MSN, APRN, CNP

## 2020-07-02 ENCOUNTER — TELEPHONE (OUTPATIENT)
Dept: FAMILY MEDICINE | Facility: CLINIC | Age: 25
End: 2020-07-02

## 2020-07-02 NOTE — TELEPHONE ENCOUNTER
"Spoke to patient's sister. She is calling today looking for help for the patient. Patient has been struggling with auditory hallucinations. He has been dealing with this for quite some time however they seem to be getting worse. Melody (patient's sister) said last night and this morning the patient has been making statements such as \"I'll give all my money to someone if they shoot me in the head. I just want this over with\". Sister said they have tried they ED route and they usually just start him on a medication and send him home. Sister said none of the medication have been helping and the patient needs more help. Patient does not feel comfortable saying what is all going on at the [place] ED as he grew up in [place] and feels like he will get judged. Sister said the patient said his mind goes blank when he talks to provider so he never discloses how bad the voices are really getting.     Advised to sister that patient needs to be seen in the ED. Offered other locations she can bring him to such as [place]. Sister thinks that is a good idea. She said she will go talk to him and get him to the ED. She will call with any questions or concerns.     MARTINEZ Albert, RN  [facility]      "

## 2020-10-02 ENCOUNTER — HOSPITAL ENCOUNTER (EMERGENCY)
Facility: CLINIC | Age: 25
Discharge: ANOTHER HOSPITAL | End: 2020-10-02
Attending: PHYSICIAN ASSISTANT | Admitting: PHYSICIAN ASSISTANT
Payer: COMMERCIAL

## 2020-10-02 ENCOUNTER — HOSPITAL ENCOUNTER (INPATIENT)
Facility: CLINIC | Age: 25
LOS: 3 days | Discharge: HOME | End: 2020-10-05
Attending: PSYCHIATRY & NEUROLOGY | Admitting: PSYCHIATRY & NEUROLOGY
Payer: COMMERCIAL

## 2020-10-02 VITALS
OXYGEN SATURATION: 97 % | WEIGHT: 210 LBS | DIASTOLIC BLOOD PRESSURE: 65 MMHG | RESPIRATION RATE: 29 BRPM | SYSTOLIC BLOOD PRESSURE: 92 MMHG | BODY MASS INDEX: 28.57 KG/M2 | TEMPERATURE: 97.1 F | HEART RATE: 64 BPM

## 2020-10-02 DIAGNOSIS — J30.89 SEASONAL ALLERGIC RHINITIS DUE TO OTHER ALLERGIC TRIGGER: ICD-10-CM

## 2020-10-02 DIAGNOSIS — T14.91XA SUICIDE ATTEMPT (H): ICD-10-CM

## 2020-10-02 DIAGNOSIS — R44.0 AUDITORY HALLUCINATION: Primary | ICD-10-CM

## 2020-10-02 DIAGNOSIS — R45.851 SUICIDAL IDEATION: ICD-10-CM

## 2020-10-02 DIAGNOSIS — T50.902A OVERDOSE, INTENTIONAL SELF-HARM, INITIAL ENCOUNTER (H): ICD-10-CM

## 2020-10-02 DIAGNOSIS — R44.0 AUDITORY HALLUCINATION: ICD-10-CM

## 2020-10-02 PROBLEM — F29 PSYCHOSIS (H): Status: ACTIVE | Noted: 2020-07-03

## 2020-10-02 LAB
ALBUMIN SERPL-MCNC: 4.3 G/DL (ref 3.4–5)
ALP SERPL-CCNC: 67 U/L (ref 40–150)
ALT SERPL W P-5'-P-CCNC: 18 U/L (ref 0–70)
AMPHETAMINES UR QL: NOT DETECTED NG/ML
ANION GAP SERPL CALCULATED.3IONS-SCNC: 9 MMOL/L (ref 3–14)
APAP SERPL-MCNC: <2 MG/L (ref 10–20)
AST SERPL W P-5'-P-CCNC: 20 U/L (ref 0–45)
BARBITURATES UR QL SCN: NOT DETECTED NG/ML
BASOPHILS # BLD AUTO: 0.1 10E9/L (ref 0–0.2)
BASOPHILS NFR BLD AUTO: 0.4 %
BENZODIAZ UR QL SCN: NOT DETECTED NG/ML
BILIRUB SERPL-MCNC: 0.9 MG/DL (ref 0.2–1.3)
BUN SERPL-MCNC: 15 MG/DL (ref 7–30)
BUPRENORPHINE UR QL: NOT DETECTED NG/ML
CALCIUM SERPL-MCNC: 9.5 MG/DL (ref 8.5–10.1)
CANNABINOIDS UR QL: NOT DETECTED NG/ML
CHLORIDE SERPL-SCNC: 112 MMOL/L (ref 94–109)
CO2 SERPL-SCNC: 22 MMOL/L (ref 20–32)
COCAINE UR QL SCN: NOT DETECTED NG/ML
CREAT SERPL-MCNC: 0.95 MG/DL (ref 0.66–1.25)
D-METHAMPHET UR QL: NOT DETECTED NG/ML
DIFFERENTIAL METHOD BLD: ABNORMAL
EOSINOPHIL NFR BLD AUTO: 0.2 %
ERYTHROCYTE [DISTWIDTH] IN BLOOD BY AUTOMATED COUNT: 11.9 % (ref 10–15)
ETHANOL SERPL-MCNC: <0.01 G/DL
GFR SERPL CREATININE-BSD FRML MDRD: >90 ML/MIN/{1.73_M2}
GLUCOSE SERPL-MCNC: 80 MG/DL (ref 70–99)
HCT VFR BLD AUTO: 44.2 % (ref 40–53)
HGB BLD-MCNC: 15.4 G/DL (ref 13.3–17.7)
IMM GRANULOCYTES # BLD: 0.1 10E9/L (ref 0–0.4)
IMM GRANULOCYTES NFR BLD: 0.4 %
LYMPHOCYTES # BLD AUTO: 1.3 10E9/L (ref 0.8–5.3)
LYMPHOCYTES NFR BLD AUTO: 9.5 %
MCH RBC QN AUTO: 32.9 PG (ref 26.5–33)
MCHC RBC AUTO-ENTMCNC: 34.8 G/DL (ref 31.5–36.5)
MCV RBC AUTO: 94 FL (ref 78–100)
METHADONE UR QL SCN: NOT DETECTED NG/ML
MONOCYTES # BLD AUTO: 0.8 10E9/L (ref 0–1.3)
MONOCYTES NFR BLD AUTO: 6 %
NEUTROPHILS # BLD AUTO: 11.6 10E9/L (ref 1.6–8.3)
NEUTROPHILS NFR BLD AUTO: 83.5 %
NRBC # BLD AUTO: 0 10*3/UL
NRBC BLD AUTO-RTO: 0 /100
OPIATES UR QL SCN: NOT DETECTED NG/ML
OXYCODONE UR QL SCN: NOT DETECTED NG/ML
PCP UR QL SCN: NOT DETECTED NG/ML
PLATELET # BLD AUTO: 197 10E9/L (ref 150–450)
POTASSIUM SERPL-SCNC: 3.4 MMOL/L (ref 3.4–5.3)
PROPOXYPH UR QL: NOT DETECTED NG/ML
PROT SERPL-MCNC: 7.7 G/DL (ref 6.8–8.8)
RBC # BLD AUTO: 4.68 10E12/L (ref 4.4–5.9)
SALICYLATES SERPL-MCNC: <2 MG/DL
SARS-COV-2 RNA SPEC QL NAA+PROBE: NORMAL
SODIUM SERPL-SCNC: 143 MMOL/L (ref 133–144)
SPECIMEN SOURCE: NORMAL
TRICYCLICS UR QL SCN: ABNORMAL NG/ML
TSH SERPL DL<=0.005 MIU/L-ACNC: 1.66 MU/L (ref 0.4–4)
WBC # BLD AUTO: 13.9 10E9/L (ref 4–11)

## 2020-10-02 PROCEDURE — 80329 ANALGESICS NON-OPIOID 1 OR 2: CPT | Performed by: PHYSICIAN ASSISTANT

## 2020-10-02 PROCEDURE — 93005 ELECTROCARDIOGRAM TRACING: CPT | Performed by: FAMILY MEDICINE

## 2020-10-02 PROCEDURE — 90791 PSYCH DIAGNOSTIC EVALUATION: CPT

## 2020-10-02 PROCEDURE — 99285 EMERGENCY DEPT VISIT HI MDM: CPT | Mod: 25 | Performed by: FAMILY MEDICINE

## 2020-10-02 PROCEDURE — 80320 DRUG SCREEN QUANTALCOHOLS: CPT | Performed by: PHYSICIAN ASSISTANT

## 2020-10-02 PROCEDURE — 85025 COMPLETE CBC W/AUTO DIFF WBC: CPT | Performed by: PHYSICIAN ASSISTANT

## 2020-10-02 PROCEDURE — 250N000013 HC RX MED GY IP 250 OP 250 PS 637: Performed by: FAMILY MEDICINE

## 2020-10-02 PROCEDURE — 80306 DRUG TEST PRSMV INSTRMNT: CPT | Performed by: PHYSICIAN ASSISTANT

## 2020-10-02 PROCEDURE — 93010 ELECTROCARDIOGRAM REPORT: CPT | Performed by: INTERNAL MEDICINE

## 2020-10-02 PROCEDURE — 84443 ASSAY THYROID STIM HORMONE: CPT | Performed by: PHYSICIAN ASSISTANT

## 2020-10-02 PROCEDURE — 124N000002 HC R&B MH UMMC

## 2020-10-02 PROCEDURE — U0003 INFECTIOUS AGENT DETECTION BY NUCLEIC ACID (DNA OR RNA); SEVERE ACUTE RESPIRATORY SYNDROME CORONAVIRUS 2 (SARS-COV-2) (CORONAVIRUS DISEASE [COVID-19]), AMPLIFIED PROBE TECHNIQUE, MAKING USE OF HIGH THROUGHPUT TECHNOLOGIES AS DESCRIBED BY CMS-2020-01-R: HCPCS | Performed by: PHYSICIAN ASSISTANT

## 2020-10-02 PROCEDURE — 93005 ELECTROCARDIOGRAM TRACING: CPT

## 2020-10-02 PROCEDURE — 80053 COMPREHEN METABOLIC PANEL: CPT | Performed by: PHYSICIAN ASSISTANT

## 2020-10-02 PROCEDURE — C9803 HOPD COVID-19 SPEC COLLECT: HCPCS

## 2020-10-02 RX ORDER — QUETIAPINE FUMARATE 200 MG/1
200 TABLET, FILM COATED ORAL EVERY EVENING
Status: ON HOLD | COMMUNITY
Start: 2020-09-25 | End: 2020-10-05

## 2020-10-02 RX ORDER — ACETAMINOPHEN 325 MG/1
650 TABLET ORAL EVERY 4 HOURS PRN
Status: DISCONTINUED | OUTPATIENT
Start: 2020-10-02 | End: 2020-10-05 | Stop reason: HOSPADM

## 2020-10-02 RX ORDER — OLANZAPINE 10 MG/2ML
10 INJECTION, POWDER, FOR SOLUTION INTRAMUSCULAR DAILY PRN
Status: DISCONTINUED | OUTPATIENT
Start: 2020-10-02 | End: 2020-10-05 | Stop reason: HOSPADM

## 2020-10-02 RX ORDER — RISPERIDONE 1 MG/1
1 TABLET, ORALLY DISINTEGRATING ORAL EVERY 4 HOURS PRN
Status: DISCONTINUED | OUTPATIENT
Start: 2020-10-02 | End: 2020-10-05 | Stop reason: HOSPADM

## 2020-10-02 RX ORDER — ALUMINA, MAGNESIA, AND SIMETHICONE 2400; 2400; 240 MG/30ML; MG/30ML; MG/30ML
30 SUSPENSION ORAL EVERY 4 HOURS PRN
Status: DISCONTINUED | OUTPATIENT
Start: 2020-10-02 | End: 2020-10-05 | Stop reason: HOSPADM

## 2020-10-02 RX ORDER — RISPERIDONE 1 MG/1
1 TABLET ORAL 2 TIMES DAILY
Status: DISCONTINUED | OUTPATIENT
Start: 2020-10-02 | End: 2020-10-03

## 2020-10-02 RX ORDER — TRAZODONE HYDROCHLORIDE 100 MG/1
100 TABLET ORAL AT BEDTIME
Status: CANCELLED | OUTPATIENT
Start: 2020-10-02

## 2020-10-02 RX ORDER — QUETIAPINE FUMARATE 200 MG/1
200 TABLET, FILM COATED ORAL EVERY EVENING
Status: DISCONTINUED | OUTPATIENT
Start: 2020-10-03 | End: 2020-10-03 | Stop reason: ALTCHOICE

## 2020-10-02 RX ORDER — QUETIAPINE FUMARATE 100 MG/1
200 TABLET, FILM COATED ORAL ONCE
Status: COMPLETED | OUTPATIENT
Start: 2020-10-02 | End: 2020-10-02

## 2020-10-02 RX ORDER — LORATADINE 10 MG/1
10 TABLET ORAL DAILY
Status: DISCONTINUED | OUTPATIENT
Start: 2020-10-03 | End: 2020-10-05 | Stop reason: HOSPADM

## 2020-10-02 RX ORDER — NICOTINE 21 MG/24HR
1 PATCH, TRANSDERMAL 24 HOURS TRANSDERMAL DAILY
Status: DISCONTINUED | OUTPATIENT
Start: 2020-10-03 | End: 2020-10-05

## 2020-10-02 RX ORDER — LANOLIN ALCOHOL/MO/W.PET/CERES
3 CREAM (GRAM) TOPICAL
Status: DISCONTINUED | OUTPATIENT
Start: 2020-10-02 | End: 2020-10-05 | Stop reason: HOSPADM

## 2020-10-02 RX ADMIN — QUETIAPINE FUMARATE 200 MG: 100 TABLET ORAL at 20:31

## 2020-10-02 ASSESSMENT — ACTIVITIES OF DAILY LIVING (ADL)
DRESS: INDEPENDENT
LAUNDRY: WITH SUPERVISION
HYGIENE/GROOMING: INDEPENDENT
ORAL_HYGIENE: INDEPENDENT

## 2020-10-02 ASSESSMENT — MIFFLIN-ST. JEOR: SCORE: 1944.27

## 2020-10-02 NOTE — ED PROVIDER NOTES
"  History     Chief Complaint   Patient presents with     Suicidal     Drug Overdose     The history is provided by the patient.     Joo Serrato is a 25 year old male who presents to the emergency department for evaluation of suicidal ideations. The patient states that about 45 minutes prior to arrival he took about half of a bottle of Buspar. This is not prescribed to him. His mother is unsure of the dosage. She says that she caught the patient in the bathroom taking the pills and had to \"bust\" open the door. He has not done anything similar to this before. The patient states that he hears voices that tell him that he has hurt people or \"done bad things\" to others that he has not actually done. He says there is a sick man in his mind making him live out and see images of fake moments in his like. These cause him to feel sick to the point where he feels like vomiting. He uses an example of an ex-girlfriend and says the voices in his head were telling him that he did something bad and had hurt her physically, but he denies this being true. He says he tries to rationalize with the voices, but they become too overwhelming and \"try to take control of his mind\". This morning the voices were telling him to hurt everyone and was having \"innapropriate thoughts\" about his mother, dad and brother. He felt so overwhelmed today he tried to kill himself. He states he still wants to die. The patient has been experiencing the same symptoms for about 1.5 years, but says they have been worsening over the past few weeks. He visited Dr. Pierre Sesay at [facility]. He was started on Seroquel one week ago, but this has not been helping his symptoms improve. He also had a referral to a psychiatrist, but has not been able to follow up with this. His mother says they are working on a diagnosis of either schizophrenia or bipolar disorder, but more likely schizophrenia. He says he hallucinates and sees images in his " "mind. He is not currently hearing these voices. The patient notes that the only way he feels good is drunk, that way he can \"forget the thoughts better\". He does not drink everyday. His last drink was about 2 weeks ago. He smokes cigarettes, but denies Marijuana or other drug use. No abdominal pain or nausea. Denies taking anything other than the Buspar and his prescribed Seroquel, but he hasn't taken the Seroquel today.        Allergies:  Allergies   Allergen Reactions     No Known Drug Allergies        Problem List:    Patient Active Problem List    Diagnosis Date Noted     Psychosis (H) 07/03/2020     Priority: Medium     Auditory hallucination 12/02/2019     Priority: Medium     Sprain of medial collateral ligament of knee 10/21/2013     Priority: Medium     Encopresis 01/29/2004     Priority: Medium     Problem list name updated by automated process. Provider to review       Urinary incontinence 01/29/2004     Priority: Medium     Problem list name updated by automated process. Provider to review          Past Medical History:    No past medical history on file.    Past Surgical History:    Past Surgical History:   Procedure Laterality Date     TONSILLECTOMY & ADENOIDECTOMY  02/13/2007       Family History:    No family history on file.    Social History:  Marital Status:  Single [1]  Social History     Tobacco Use     Smoking status: Current Every Day Smoker     Packs/day: 1.00     Smokeless tobacco: Current User     Types: Chew   Substance Use Topics     Alcohol use: No     Drug use: No        Medications:         Acetaminophen (TYLENOL PO)       IBUPROFEN PO       loratadine (CLARITIN) 10 MG tablet       QUEtiapine (SEROQUEL) 200 MG tablet       risperiDONE (RISPERDAL) 1 MG tablet       traZODone (DESYREL) 100 MG tablet          Review of Systems   All other systems reviewed and are negative.      Physical Exam   BP: (!) 141/128  Pulse: 90  Temp: 97.1  F (36.2  C)  Resp: 18  Weight: 95.3 kg (210 lb)  SpO2: " 97 %      Physical Exam  Vitals signs and nursing note reviewed.   Constitutional:       General: He is not in acute distress.     Appearance: Normal appearance. He is well-developed. He is not ill-appearing, toxic-appearing or diaphoretic.   HENT:      Head: Normocephalic and atraumatic.      Nose: Nose normal.      Mouth/Throat:      Mouth: Mucous membranes are moist.      Pharynx: Oropharynx is clear.   Eyes:      Conjunctiva/sclera: Conjunctivae normal.      Pupils: Pupils are equal, round, and reactive to light.   Neck:      Musculoskeletal: Neck supple.   Cardiovascular:      Rate and Rhythm: Normal rate and regular rhythm.      Heart sounds: Normal heart sounds.   Pulmonary:      Effort: Pulmonary effort is normal. No respiratory distress.      Breath sounds: Normal breath sounds.   Abdominal:      General: Bowel sounds are normal. There is no distension.      Palpations: Abdomen is soft.      Tenderness: There is no abdominal tenderness.   Musculoskeletal:         General: No deformity.   Skin:     General: Skin is warm and dry.   Neurological:      Mental Status: He is alert and oriented to person, place, and time. Mental status is at baseline.      Coordination: Coordination normal.   Psychiatric:         Mood and Affect: Mood is depressed. Affect is flat.         Speech: Speech is rapid and pressured.         Behavior: Behavior normal.         Thought Content: Thought content includes suicidal ideation. Thought content does not include homicidal ideation. Thought content includes suicidal plan.         ED Course        Procedures      Results for orders placed or performed during the hospital encounter of 10/02/20 (from the past 24 hour(s))   CBC with platelets differential   Result Value Ref Range    WBC 13.9 (H) 4.0 - 11.0 10e9/L    RBC Count 4.68 4.4 - 5.9 10e12/L    Hemoglobin 15.4 13.3 - 17.7 g/dL    Hematocrit 44.2 40.0 - 53.0 %    MCV 94 78 - 100 fl    MCH 32.9 26.5 - 33.0 pg    MCHC 34.8 31.5 -  36.5 g/dL    RDW 11.9 10.0 - 15.0 %    Platelet Count 197 150 - 450 10e9/L    Diff Method Automated Method     % Neutrophils 83.5 %    % Lymphocytes 9.5 %    % Monocytes 6.0 %    % Eosinophils 0.2 %    % Basophils 0.4 %    % Immature Granulocytes 0.4 %    Nucleated RBCs 0 0 /100    Absolute Neutrophil 11.6 (H) 1.6 - 8.3 10e9/L    Absolute Lymphocytes 1.3 0.8 - 5.3 10e9/L    Absolute Monocytes 0.8 0.0 - 1.3 10e9/L    Absolute Basophils 0.1 0.0 - 0.2 10e9/L    Abs Immature Granulocytes 0.1 0 - 0.4 10e9/L    Absolute Nucleated RBC 0.0    Comprehensive metabolic panel   Result Value Ref Range    Sodium 143 133 - 144 mmol/L    Potassium 3.4 3.4 - 5.3 mmol/L    Chloride 112 (H) 94 - 109 mmol/L    Carbon Dioxide 22 20 - 32 mmol/L    Anion Gap 9 3 - 14 mmol/L    Glucose 80 70 - 99 mg/dL    Urea Nitrogen 15 7 - 30 mg/dL    Creatinine 0.95 0.66 - 1.25 mg/dL    GFR Estimate >90 >60 mL/min/[1.73_m2]    GFR Estimate If Black >90 >60 mL/min/[1.73_m2]    Calcium 9.5 8.5 - 10.1 mg/dL    Bilirubin Total 0.9 0.2 - 1.3 mg/dL    Albumin 4.3 3.4 - 5.0 g/dL    Protein Total 7.7 6.8 - 8.8 g/dL    Alkaline Phosphatase 67 40 - 150 U/L    ALT 18 0 - 70 U/L    AST 20 0 - 45 U/L   TSH with free T4 reflex   Result Value Ref Range    TSH 1.66 0.40 - 4.00 mU/L   Alcohol ethyl   Result Value Ref Range    Ethanol g/dL <0.01 <0.01 g/dL   Acetaminophen level   Result Value Ref Range    Acetaminophen Level <2 mg/L   Salicylate level   Result Value Ref Range    Salicylate Level <2 mg/dL   Urine Drugs of Abuse Screen Panel 13   Result Value Ref Range    Cannabinoids (14-srp-3-carboxy-9-THC) Not Detected NDET^Not Detected ng/mL    Phencyclidine (Phencyclidine) Not Detected NDET^Not Detected ng/mL    Cocaine (Benzoylecgonine) Not Detected NDET^Not Detected ng/mL    Methamphetamine (d-Methamphetamine) Not Detected NDET^Not Detected ng/mL    Opiates (Morphine) Not Detected NDET^Not Detected ng/mL    Amphetamine (d-Amphetamine) Not Detected NDET^Not Detected  ng/mL    Benzodiazepines (Nordiazepam) Not Detected NDET^Not Detected ng/mL    Tricyclic Antidepressants (Desipramine) Detected, Abnormal Result (A) NDET^Not Detected ng/mL    Methadone (Methadone) Not Detected NDET^Not Detected ng/mL    Barbiturates (Butalbital) Not Detected NDET^Not Detected ng/mL    Oxycodone (Oxycodone) Not Detected NDET^Not Detected ng/mL    Propoxyphene (Norpropoxyphene) Not Detected NDET^Not Detected ng/mL    Buprenorphine (Buprenorphine) Not Detected NDET^Not Detected ng/mL       Medications - No data to display    Assessments & Plan (with Medical Decision Making)  25 year old male who presents for evaluation of suicidal ideations and an attempted overdose. He has a history of auditory hallucinations, with suspected diagnosis of schizophrenia.  His overwhelming auditory hallucinations led him to feel suicidal and act upon it today.  See detailed HPI for full history. Upon arrival the patient is hypertensive at 141/128. He is otherwise afebrile and all other vitals are within normal limits.  Exam without acute abnormalities.  He was depressed with a flat affect and very rapid/pressured speech.  He was persistently suicidal while here so I feel that he will warrant inpatient admission.  Poison control contacted and advised that patient may experience some dizziness, drowsiness, and upset stomach but otherwise no life-threatening side effects from BuSpar.  Peak will be around 1 to 2 hours.  He arrived 1 hour after taking it so he will be observed for an additional hour for any potential side effects.  Screening labs were collected and reviewed. CBC showed a WBC count of 13.9, but was otherwise normal. A urine drug screen showed tricyclic antidepressants were detected.  He denies taking anything other than BuSpar and is not prescribed TCAs.  Question if this is a false positive from the Seroquel as this can be a known side effect.  All other labs showed no acute abnormalities, salicylate and  Tylenol levels negative. The patient spoke with a DEC  for further evaluation once medically cleared and  agreed that he warrants inpatient admission.  A bed was made available at [facility], patient will go by ambulance for continued monitoring given his active suicidal ideations.  He did remain calm and cooperative throughout ED stay but remained on one-to-one watch.  Staffed in the ED with Dr. Morales.     I have reviewed the nursing notes.    I have reviewed the findings, diagnosis, plan and need for follow up with the patient.    New Prescriptions    No medications on file       Final diagnoses:   Auditory hallucination   Suicide attempt (H)   Overdose, intentional self-harm, initial encounter (H)   Suicidal ideation       This document serves as a record of services personally performed by Maria Ines Hernandez PA*. It was created on their behalf by Marj Hernandez, a trained medical scribe. The creation of this record is based on the provider's personal observations and the statements of the patient. This document has been checked and approved by the attending provider.  Note: Chart documentation done in part with Dragon Voice Recognition software. Although reviewed after completion, some word and grammatical errors may remain.  10/2/2020   [facility] EMERGENCY DEPT     Maria Ines Hernandez PA-C  10/02/20 2027

## 2020-10-02 NOTE — ED NOTES
poison control called, will watch pt, peaks at 1 to 2 hours.  May cause drowsiness, dizziness and GI upset.

## 2020-10-03 PROBLEM — F20.0 CHRONIC PARANOID SCHIZOPHRENIA (H): Status: ACTIVE | Noted: 2020-10-03

## 2020-10-03 LAB
ERYTHROCYTE [DISTWIDTH] IN BLOOD BY AUTOMATED COUNT: 12.3 % (ref 10–15)
HCT VFR BLD AUTO: 45.3 % (ref 40–53)
HGB BLD-MCNC: 15.3 G/DL (ref 13.3–17.7)
LABORATORY COMMENT REPORT: NORMAL
MCH RBC QN AUTO: 32.9 PG (ref 26.5–33)
MCHC RBC AUTO-ENTMCNC: 33.8 G/DL (ref 31.5–36.5)
MCV RBC AUTO: 97 FL (ref 78–100)
PLATELET # BLD AUTO: 192 10E9/L (ref 150–450)
RBC # BLD AUTO: 4.65 10E12/L (ref 4.4–5.9)
SARS-COV-2 RNA SPEC QL NAA+PROBE: NEGATIVE
SPECIMEN SOURCE: NORMAL
WBC # BLD AUTO: 5.5 10E9/L (ref 4–11)

## 2020-10-03 PROCEDURE — 250N000013 HC RX MED GY IP 250 OP 250 PS 637: Performed by: STUDENT IN AN ORGANIZED HEALTH CARE EDUCATION/TRAINING PROGRAM

## 2020-10-03 PROCEDURE — 124N000002 HC R&B MH UMMC

## 2020-10-03 PROCEDURE — 250N000013 HC RX MED GY IP 250 OP 250 PS 637: Performed by: PSYCHIATRY & NEUROLOGY

## 2020-10-03 PROCEDURE — 99223 1ST HOSP IP/OBS HIGH 75: CPT | Mod: AI | Performed by: PSYCHIATRY & NEUROLOGY

## 2020-10-03 PROCEDURE — 85027 COMPLETE CBC AUTOMATED: CPT | Performed by: STUDENT IN AN ORGANIZED HEALTH CARE EDUCATION/TRAINING PROGRAM

## 2020-10-03 PROCEDURE — 36415 COLL VENOUS BLD VENIPUNCTURE: CPT | Performed by: STUDENT IN AN ORGANIZED HEALTH CARE EDUCATION/TRAINING PROGRAM

## 2020-10-03 RX ORDER — RISPERIDONE 2 MG/1
2 TABLET ORAL 2 TIMES DAILY
Status: DISCONTINUED | OUTPATIENT
Start: 2020-10-03 | End: 2020-10-05 | Stop reason: HOSPADM

## 2020-10-03 RX ORDER — TRAZODONE HYDROCHLORIDE 50 MG/1
50-100 TABLET, FILM COATED ORAL
Status: DISCONTINUED | OUTPATIENT
Start: 2020-10-03 | End: 2020-10-05 | Stop reason: HOSPADM

## 2020-10-03 RX ORDER — RISPERIDONE 2 MG/1
3 TABLET ORAL DAILY
Status: ON HOLD | COMMUNITY
End: 2020-10-05

## 2020-10-03 RX ADMIN — RISPERIDONE 2 MG: 2 TABLET ORAL at 21:26

## 2020-10-03 RX ADMIN — MELATONIN 3 MG: 3 TAB ORAL at 18:15

## 2020-10-03 RX ADMIN — RISPERIDONE 1 MG: 1 TABLET ORAL at 09:16

## 2020-10-03 RX ADMIN — NICOTINE POLACRILEX 4 MG: 2 GUM, CHEWING ORAL at 21:53

## 2020-10-03 RX ADMIN — NICOTINE POLACRILEX 4 MG: 2 GUM, CHEWING ORAL at 18:05

## 2020-10-03 RX ADMIN — NICOTINE POLACRILEX 4 MG: 2 GUM, CHEWING ORAL at 09:18

## 2020-10-03 RX ADMIN — NICOTINE POLACRILEX 4 MG: 2 GUM, CHEWING ORAL at 16:19

## 2020-10-03 RX ADMIN — RISPERIDONE 1 MG: 1 TABLET, ORALLY DISINTEGRATING ORAL at 17:13

## 2020-10-03 RX ADMIN — LORATADINE 10 MG: 10 TABLET ORAL at 09:16

## 2020-10-03 RX ADMIN — NICOTINE POLACRILEX 4 MG: 2 GUM, CHEWING ORAL at 15:02

## 2020-10-03 RX ADMIN — RISPERIDONE 1 MG: 1 TABLET ORAL at 00:29

## 2020-10-03 RX ADMIN — TRAZODONE HYDROCHLORIDE 100 MG: 50 TABLET ORAL at 21:26

## 2020-10-03 ASSESSMENT — ACTIVITIES OF DAILY LIVING (ADL)
DRESS: INDEPENDENT
LAUNDRY: WITH SUPERVISION
LAUNDRY: WITH SUPERVISION
DRESS: SCRUBS (BEHAVIORAL HEALTH)
ORAL_HYGIENE: INDEPENDENT
HYGIENE/GROOMING: INDEPENDENT
ORAL_HYGIENE: INDEPENDENT
HYGIENE/GROOMING: INDEPENDENT

## 2020-10-03 NOTE — PROGRESS NOTES
"   10/03/20 1200   Psycho Education   Type of Intervention 1:1 intervention   Response refuses   Behavioral Health   Hallucinations auditory;visual  (pt endorse both auditory/visual hallucination)   Thinking intact   Orientation person: oriented;place: oriented;date: oriented;time: oriented   Memory baseline memory   Insight admits / accepts   Judgement impaired   Eye Contact at examiner   Affect blunted, flat   Mood depressed;anxious;mood is calm   Physical Appearance/Attire appears stated age   Hygiene well groomed  (took shower)   Suicidality other (see comments)  (pt denies having any thoughts of SI/SIB)   Self Injury other (see comment)  (pt denies having any thoughts of SI/SIB)   Activities of Daily Living   Hygiene/Grooming independent   Oral Hygiene independent   Dress scrubs (behavioral health)   Laundry with supervision   Room Organization independent       Patient was isolated in his room thought-out the shift. Patient mood was calm, blunt-flat, and approachable. Patient endorse having auditory/visual hallucinations.The voices volume is adarsh 10/10( 10 consistently none stop). Patient informed the staff that voice tells reminder him his past as failure person, which makes the patient angry to do bad things, but been here helps.  The patient endorse anxious (10/10), depression (7/10) and denies having any physical pain. The patient describes appetite \"good\". The staff encourage the patient to come out the room and stay lounge to watch the movie with peer but the patient decline, stated \" I am tired and I want to sleep.\"  Overall, the patient took shower this morning, ate all meals, and was calm mood. The patient safety intact at all time. No abnormal behaviors occur at this time.  " Lt ac #18 LR @125cc/hr

## 2020-10-03 NOTE — PROGRESS NOTES
Pt slept 7 hours this night,risperidone given at 0030[2200 dose], sib and suicide precautions remain in place,lab draw scheduled for this am,ekg result describes bradycardia , defer to team,

## 2020-10-03 NOTE — PHARMACY-ADMISSION MEDICATION HISTORY
Admission Medication History Completed by Pharmacy    See [facility] Admission Navigator for allergy information, preferred outpatient pharmacy, prior to admission medications and immunization status.     Medication History Sources:     Patient    Sure Scripts    Changes made to PTA medication list (reason):    Added: None    Deleted:   o Acetaminophen: no dose or instructions (per patient)  o Ibuprofen: no dose (per patient)  o Loratadine 10mg tablet: no instructions (per patient)  o Trazodone 100mg tablet: 1qd hs    Changed:   o Risperidone 1mg tablet: 1BID and 1qd prn for pyschosis/paranoia --> Risperidone 2mg tablet: 1ssqd (per patient and Sure Scripts)    Additional Information:    Patient was able to recall some information about his medications but not doses.    Patient was asked if he was taking risperidone 1mg or 2mg and stated he was on 2mg but fill history shows no refills since January. Tried to follow up with pharmacy ([facility]) but both were closed at time of call.    Prior to Admission medications    Medication Sig Last Dose Taking? Auth Provider   QUEtiapine (SEROQUEL) 200 MG tablet Take 200 mg by mouth every evening 10/2/2020 Yes Reported, Patient   risperiDONE (RISPERDAL) 2 MG tablet Take 3 mg by mouth daily  10/2/2020 Yes Unknown, Entered By History       Date completed: 10/03/20    Medication history completed by: Helena Verde
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
## 2020-10-03 NOTE — PROGRESS NOTES
10/02/20 2213   Valuables   Patient Belongings locker   Patient Belongings Put in Hospital Secure Location (Security or Locker, etc.) cash/credit card;money (see comment);wallet;other (see comments)   Did you bring any home meds/supplements to the hospital?  No     Items brought in on admission (10/02/20):   White shirt, jeans, underwear, socks, sweatshirt, white phone charging cord, and wallet w/various cards. 6 debit/credit cards sent to security in envelope #[id number]. NO phone or keys.      A               Admission:  I am responsible for any personal items that are not sent to the safe or pharmacy.  [facility] is not responsible for loss, theft or damage of any property in my possession.    Signature:  _________________________________ Date: _______  Time: _____                                              Staff Signature:  ____________________________ Date: ________  Time: _____      2nd Staff person, if patient is unable/unwilling to sign:    Signature: ________________________________ Date: ________  Time: _____     Discharge:  [facility] has returned all of my personal belongings:    Signature: _________________________________ Date: ________  Time: _____                                          Staff Signature:  ____________________________ Date: ________  Time: _____

## 2020-10-03 NOTE — PROGRESS NOTES
"Initial Psychosocial Assessment  I have reviewed the chart, met with the patient, and developed Care Plan. Information for assessment was obtained from the patient, the patient's medical chart, and the patient's DEC assessment.      Presenting Problem: Joo Serrato is a 25 year old male who presented to the unit with complaints of a suicide attempt via overdose. According to the ED provider note, the patient attempted suicide via overdose on his mother's Buspar medication. The ED note reported that the patient has a previous history of auditory and visual hallucinations. The patient reported to the provider that the voices told him he has \"hurt people\" and \"done bad things.\"    During this interview the patient presented with a flat affect but was cooperative and polite. When asked why he is currently in the hospital, the patient stated that the voices \"make me think of nasty and fake shit with no real proof of this shit.\" The patient reported to writer that he began to have auditory hallucinations a year ago and he hears 5-6 voices but does not recognize them as specific people. The patient denied having SI during this interview and stated that his only goal is to get the voices to go away.      History of Mental Health and Chemical Dependency: The patient reported that he has a previous diagnosis of Schizophrenia. The patient stated that he first started experiencing auditory hallucinations a year ago but this is second hospitalization. The patient reported his first hospitalization was in July at [facility] for psychotic symptoms. The patient denied any CD use. He stated that he will drink a beer \"every once in a while.\" The patient denied any previous CD treatment.     Past Diagnosis: Schizophrenia, Unspecified type    Current Symptoms: Auditory hallucinations     Past Hospitalizations: [facility] 7/2/20-7/5/20, Multiple ED visits.     History of SI or SIB: The patient denied any " "history of SI or SIB. According to the patient's chart, the patient tried to overdose on his mother's Buspar as a suicide attempt, as noted as the reason for hospitalization.     History of Aggression: None noted.     Significant Life Events  (Illness, Abuse, Trauma, Death): The patient denied.      Family/Living Situation: The patient reported that he is currently living alone in [facility]. The patient's chart indicates that he is currently living with his parents due to financial stressors. He stated that he was born and raised with both of his parents along with two brothers and two sisters. He reported that these familial relationships are meaningful and intact and his family is currently living in [place], [place]. The patient denied having any children or significant other.     Familial History of Mental Illness: The patient stated that his mother has a diagnosis of Bipolar Disorder.      Educational Background: High School Diploma.     Financial Status (Employment/Income): The patient reported that he is working full time as a .      Legal Issues: The patient denied any legal issues.   Patient is currently on a 72 HH:     Hold Start - 10/2 @ 8:52pm  Hold End: 10/7 @ 8:52pm    Legal Guardian: The patient is his own legal guardian.       Ethnic/Cultural Considerations: None.       Spiritual Orientation: None.         Service History: None.      Social Functioning (organization, interests, Strengths): \"Working and stuff.\"     Goals for Hospitalization: The patient stated \"I don't know. For the voices to stop, but I know that's not going to happen.\"      Current Treatment Providers are:  Primary Care: Hannah Pineda MD with [facility].  Psychiatry: Radha Arriaza [facility].  Therapist: None.  : None.   Other Providers: Kathia (Mother) [phone]  Social Service Assessment/Plan: CTC will explore options for follow up care and  provide a psychological " assessment.Patient will be provided with a safe environment, medication management, as well as offered groups for coping skills.

## 2020-10-03 NOTE — PROGRESS NOTES
10/02/20 2210   Vital Signs   Temp 97.9  F (36.6  C)   Temp src Oral   Resp 16   Pulse 89   Pulse Rate Source Monitor   /74   Oxygen Therapy   SpO2 98 %   O2 Device None (Room air)   Height and Weight   Height 1.829 m (6')  (6'0'')   Weight 91.6 kg (202 lb)

## 2020-10-03 NOTE — PLAN OF CARE
Problem: Behavioral Health Plan of Care  Goal: Patient-Specific Goal (Individualization)  Flowsheets (Taken 10/3/2020 1035)  Patient Vulnerabilities:   poor impulse control   history of unsuccessful treatment  Patient Personal Strengths: family/social support      Patient Goals:   Medication changes for mod stabilization  Develop appropriate coping skills  Establish an effective discharge plan

## 2020-10-03 NOTE — PLAN OF CARE
Admission:     Admitted to  22 on a 72 hr hold after taking mothers Buspar in a suicide attempt. Pt states this was impulsive, and due to auditory hallucinations becoming overwhelming. States he has no prior attempts. States he has had the voices for approximately a year. Was previously diagnosed with schizophrenia and bipolar. States he was hospitalized once prior at [facility] for mental health. States he currently takes Seroquel, which was prescribed a week ago, but it has not been helpful. Denies any other current prescriptions. Denies any other current stressors aside from the auditory hallucinations. (See chart review, DEC assessment for further details and hx)    Presents as calm, polite and cooperative completing admission. Speech is clear and coherent, appropriate. Full range affect. [place] is balanced and steady. Was provided meal, which he ate all of. Oriented to room and unit. Encouraged to notify staff of needs, questions, and concerns. Pt voices understanding. States he is ready to go to sleep. Denies any further questions or concerns at this time.

## 2020-10-04 LAB — INTERPRETATION ECG - MUSE: NORMAL

## 2020-10-04 PROCEDURE — 250N000013 HC RX MED GY IP 250 OP 250 PS 637: Performed by: STUDENT IN AN ORGANIZED HEALTH CARE EDUCATION/TRAINING PROGRAM

## 2020-10-04 PROCEDURE — 250N000013 HC RX MED GY IP 250 OP 250 PS 637: Performed by: PSYCHIATRY & NEUROLOGY

## 2020-10-04 PROCEDURE — 124N000002 HC R&B MH UMMC

## 2020-10-04 RX ADMIN — MELATONIN 3 MG: 3 TAB ORAL at 18:32

## 2020-10-04 RX ADMIN — NICOTINE POLACRILEX 4 MG: 2 GUM, CHEWING ORAL at 15:06

## 2020-10-04 RX ADMIN — LORATADINE 10 MG: 10 TABLET ORAL at 08:48

## 2020-10-04 RX ADMIN — RISPERIDONE 1 MG: 1 TABLET, ORALLY DISINTEGRATING ORAL at 11:22

## 2020-10-04 RX ADMIN — NICOTINE POLACRILEX 4 MG: 2 GUM, CHEWING ORAL at 12:59

## 2020-10-04 RX ADMIN — NICOTINE POLACRILEX 4 MG: 2 GUM, CHEWING ORAL at 13:59

## 2020-10-04 RX ADMIN — RISPERIDONE 2 MG: 2 TABLET ORAL at 19:18

## 2020-10-04 RX ADMIN — NICOTINE POLACRILEX 4 MG: 2 GUM, CHEWING ORAL at 09:52

## 2020-10-04 RX ADMIN — NICOTINE POLACRILEX 4 MG: 2 GUM, CHEWING ORAL at 08:48

## 2020-10-04 RX ADMIN — NICOTINE POLACRILEX 4 MG: 2 GUM, CHEWING ORAL at 07:07

## 2020-10-04 RX ADMIN — TRAZODONE HYDROCHLORIDE 100 MG: 50 TABLET ORAL at 19:18

## 2020-10-04 RX ADMIN — NICOTINE POLACRILEX 4 MG: 2 GUM, CHEWING ORAL at 17:40

## 2020-10-04 RX ADMIN — NICOTINE POLACRILEX 4 MG: 2 GUM, CHEWING ORAL at 15:56

## 2020-10-04 RX ADMIN — RISPERIDONE 2 MG: 2 TABLET ORAL at 08:48

## 2020-10-04 ASSESSMENT — ACTIVITIES OF DAILY LIVING (ADL)
LAUNDRY: WITH SUPERVISION
HYGIENE/GROOMING: INDEPENDENT
LAUNDRY: WITH SUPERVISION
HYGIENE/GROOMING: INDEPENDENT
ORAL_HYGIENE: INDEPENDENT
ORAL_HYGIENE: INDEPENDENT
DRESS: INDEPENDENT
DRESS: INDEPENDENT

## 2020-10-04 ASSESSMENT — MIFFLIN-ST. JEOR: SCORE: 1959.69

## 2020-10-04 NOTE — PROGRESS NOTES
"Pt had an okay shift. Pt was visible and present in the milieu intermittently throughout the shift. Pt paced the halls, sat in the lounge and watched TV and minimally socialized with peers. Pt presents with severe anxiety and was frequently overheard saying \"Oh my god!\" to himself when out in the milieu. Writer approached pt and asked if he was okay. Pt replied \"I just have so much anxiety about everything\". Writer validated pt's feelings and encouraged him to speak with his nurse. Pt presents with a blunted/flat affect and an anxious/depressed mood. Pt does appear to be responding to internal stimuli and is frequently observed speaking and laughing aloud to himself. Pt refused to check in and thus this writer was unable to fully assess SI/SIB, anxiety, depression, hallucinations and medication side effects.        10/03/20 2200   Psycho Education   Type of Intervention 1:1 intervention   Response refuses   Behavioral Health   Hallucinations auditory   Thinking distractable;poor concentration;paranoid   Orientation person: oriented;place: oriented;time: oriented;date: oriented   Memory baseline memory   Insight admits / accepts   Judgement impaired   Eye Contact at examiner   Affect full range affect   Mood depressed;anxious   Suicidality   (MAZIN)   2. Non-Specific Active Suicidal Thoughts (Recent)   (MAZIN)   Activities of Daily Living   Hygiene/Grooming independent   Oral Hygiene independent   Dress independent   Laundry with supervision   Room Organization independent     "

## 2020-10-05 ENCOUNTER — DOCUMENTATION ONLY (OUTPATIENT)
Dept: OTHER | Facility: CLINIC | Age: 25
End: 2020-10-05

## 2020-10-05 VITALS
RESPIRATION RATE: 16 BRPM | SYSTOLIC BLOOD PRESSURE: 104 MMHG | BODY MASS INDEX: 27.82 KG/M2 | HEART RATE: 92 BPM | OXYGEN SATURATION: 98 % | TEMPERATURE: 98.4 F | HEIGHT: 72 IN | WEIGHT: 205.4 LBS | DIASTOLIC BLOOD PRESSURE: 66 MMHG

## 2020-10-05 PROCEDURE — G0177 OPPS/PHP; TRAIN & EDUC SERV: HCPCS

## 2020-10-05 PROCEDURE — 250N000013 HC RX MED GY IP 250 OP 250 PS 637: Performed by: STUDENT IN AN ORGANIZED HEALTH CARE EDUCATION/TRAINING PROGRAM

## 2020-10-05 PROCEDURE — 250N000013 HC RX MED GY IP 250 OP 250 PS 637: Performed by: PSYCHIATRY & NEUROLOGY

## 2020-10-05 PROCEDURE — 99238 HOSP IP/OBS DSCHRG MGMT 30/<: CPT | Mod: GC | Performed by: PSYCHIATRY & NEUROLOGY

## 2020-10-05 RX ORDER — LORATADINE 10 MG/1
10 TABLET ORAL DAILY
Qty: 30 TABLET | Refills: 0 | Status: SHIPPED | OUTPATIENT
Start: 2020-10-05 | End: 2020-11-04

## 2020-10-05 RX ORDER — RISPERIDONE 2 MG/1
2 TABLET ORAL 2 TIMES DAILY
Qty: 60 TABLET | Refills: 0 | Status: SHIPPED | OUTPATIENT
Start: 2020-10-05 | End: 2020-11-04

## 2020-10-05 RX ADMIN — NICOTINE POLACRILEX 4 MG: 2 GUM, CHEWING ORAL at 17:05

## 2020-10-05 RX ADMIN — NICOTINE POLACRILEX 4 MG: 2 GUM, CHEWING ORAL at 08:51

## 2020-10-05 RX ADMIN — NICOTINE POLACRILEX 4 MG: 2 GUM, CHEWING ORAL at 07:05

## 2020-10-05 RX ADMIN — NICOTINE POLACRILEX 4 MG: 2 GUM, CHEWING ORAL at 14:12

## 2020-10-05 RX ADMIN — NICOTINE POLACRILEX 4 MG: 2 GUM, CHEWING ORAL at 11:38

## 2020-10-05 RX ADMIN — NICOTINE POLACRILEX 4 MG: 2 GUM, CHEWING ORAL at 10:24

## 2020-10-05 RX ADMIN — NICOTINE POLACRILEX 4 MG: 2 GUM, CHEWING ORAL at 15:57

## 2020-10-05 RX ADMIN — LORATADINE 10 MG: 10 TABLET ORAL at 08:51

## 2020-10-05 RX ADMIN — RISPERIDONE 2 MG: 2 TABLET ORAL at 08:51

## 2020-10-05 ASSESSMENT — ACTIVITIES OF DAILY LIVING (ADL)
DRESS: INDEPENDENT
LAUNDRY: WITH SUPERVISION
ORAL_HYGIENE: INDEPENDENT
HYGIENE/GROOMING: INDEPENDENT

## 2020-10-05 NOTE — PROGRESS NOTES
patient is wanting discharge - is admitted on a 72 hour hold - patient gave permission for team to talk to his mother.     This writer spoke with patient's mother Kathia on the phone today. She reports that patient is sounding so much better since admission - he himself is talking to her about how he really thinks the Risperdal is helping and that he should stay on this medication. She says previously he has at times said he doesn't think he needs medication. She also says that his talking to the team about wanting to get home in part to work is a good sign because previously he has appeared too disorganized/preoccupied with the voices to think about work.     She says he has been staying with her in [place] and supports him being discharged this evening/ending 72 hour hold - not pursuing petition.   She will encourage him to continue to rest and not go back to working too soon.     She has been in touch with his therapist at [facility] -Dequan Bey LP, PhD, LMFT  - therapist/clinic have instructed mom to have patient schedule again with Dequan at discharge and they will also start him with their psychiatric medication provider they utilize telehealth for provider. She reports she will help schedule that next appointment for the therapist and med provider when patient is home so can ensure is helping him get to follow up.     She is aware that patient will discharge with a 30 day supply of medication and that process will take some time (hours) will plan on picking up patient this evening.

## 2020-10-05 NOTE — PROGRESS NOTES
Work Completed  - chart review  - team meeting  - team rounds/pt interview via Zoom  - post team rounds team meeting / discussion  - collateral contact conversation with pt's mom (see note)  - discharge planning  - behavioral team discussion note completed for plan of care  - communication sent to [employer] requesting that alert stating patient may have a legal guardian be removed from chart as patient is his own legal guardian      Discharge plan or goal  Pt will discharge to community - will stay with his mom for time period after discahrge - she is assisting him to schedule next follow up.     Barriers to discharge  None - hold will be discontinued and pt will discharge.

## 2020-10-05 NOTE — PROGRESS NOTES
Patient discharging home with mom. AVS printed and discharge instructions reviewed with patient. He verbalizes understanding and states he intends to comply with all discharge recommendations including medications and follow up appointments. Patient denies current SI/SIB or thoughts to harm others. All belongings, including medications from [place] discharge pharmacy returned to patient upon discharge.

## 2020-10-05 NOTE — PROGRESS NOTES
"Patient alert and oriented to person, place, time and situation. Stated mood \"better\", slightly anxious. Affect restless, slightly tense and congruent with stated mood. He denied any current SI or self harm ideation. Minimized events leading up to discharge, stating that he did not ingest the Buspar with the intent to kill himself and that it was an impulsive action. Stated that he felt that Risperdal had been helpful to decrease the disturbing nature of his AH, although the AH were still present, he denied that they were currently giving him negative commands to hurt himself. He reported sleeping well overnight. Appetite intact. Denied side effects from the medications. He was focused on wanting to discharge. After discussing with the team and the team speaking with this mother, decision made to discharge him this evening. 72 hold discontinued and discharge order placed.  "

## 2020-10-05 NOTE — PROGRESS NOTES
"  Pt has been engaged and attending groups. Pt presents upbeat and pleasant social interaction. Pt mentions, \" I was doing well on my medications at home, but everything went off when I stopped that's why I checked myself in\". Pt states depression at zero and anxiety between 8 and 9. Pt denies SI and SIB and no negative thoughts. Pt states, \" I am pretty well intoned with things around compare to Friday\". Pt states concentration is good though needs to stay busy to cope with anxiety which he thinks is his baseline. Pt is having a good shift needing no redirection and willing to attend more groups.     10/05/20 1221   Behavioral Health   Hallucinations denies / not responding to hallucinations   Thinking intact   Orientation person: oriented;place: oriented;date: oriented   Memory baseline memory   Insight admits / accepts   Judgement intact   Eye Contact at examiner   Affect full range affect   Mood anxious;mood is calm   Physical Appearance/Attire attire appropriate to age and situation   Hygiene other (see comment)  (Adequate)   Suicidality other (see comments)  (Pt denies)   Self Injury other (see comment)  (pt denies)   Elopement   (none)   Activity other (see comment)  (socially pleasant and engaged)   Speech clear;coherent   Medication Sensitivity no stated side effects     "

## 2020-10-05 NOTE — DISCHARGE SUMMARY
"    Psychiatric Discharge Summary    Hospital Day #3    Joo Serrato MRN# [id number]   Age: 25 year old YOB: 1995     Date of Admission:  10/2/2020  Date of Discharge:  10/5/2020  Admitting Physician:  Cuco Wing MD  Discharge Physician:  Haleigh Flores MD         Event Leading to Hospitalization:     History obtained from patient and electronic chart     Joo Serrato is a 25 year old previously diagnosed with unspecified psychosis (c/f Schizophrenia vs BPAD) admitted from the  [facility] ED s/p intentional overdose on Buspar in the context of CAH telling him to harm himself. This is in the setting of medication adherence and recently adding Seroquel to his medication regimen.     Per ED Note:   Joo Serrato is a 25 year old male who presents to the emergency department for evaluation of suicidal ideations. The patient states that about 45 minutes prior to arrival he took about half of a bottle of Buspar. This is not prescribed to him. His mother is unsure of the dosage. She says that she caught the patient in the bathroom taking the pills and had to \"bust\" open the door. He has not done anything similar to this before. The patient has been experiencing the same symptoms for about 1.5 years, but says they have been worsening over the past few weeks. He visited Dr. Pierre Sesay at [facility]. He was started on Seroquel one week ago, but this has not been helping his symptoms improve. He also had a referral to a psychiatrist, but has not been able to follow up with this. His mother says he was diagnosed with schizophrenia and bipolar disorder. The patient states that he hears voices that tell him that he has hurt people or \"done bad things\" to others that he has not actually done. He says there is a sick man in his mind making him live out and see images of fake moments in his like. These cause him to feel sick to the point where he feels like vomiting. He uses " "an example of an ex-girlfriend and says the voices in his head were telling him that he did something bad and had hurt her physically, but he denies this being true. He says he tries to rationalize with the voices, but they become too overwhelming and \"try to take control of his mind\". This morning the voices were telling him to hurt everyone and was having \"innapropriate thoughts\" about his mother, dad and brother. He says he hallucinates and sees images in his mind, but does not see figures around him. He is not currently hearing these voices. The patient notes that the only way he feels good is drunk, that way he can \"forget the thoughts better\". He does not drink everyday. His last drink was about 2 weeks ago. He smokes cigarettes, but denies Marijuana or other drug use. No abdominal pain or nausea.     He was medically cleared for admission to inpatient psychiatric unit.     Per patient report:    Joo Serrato reports approximately 1 year ago he began to experience auditory hallucinations that began as a voice telling him that he had hurt a women in a past relationship. He describes a \"fog\" that included voices that tell him false beliefs and discouraging things about himself. He states \"these voices lie about everything they possibly can to make me feel gross\" and \"they are the opposite of who I am\". When asked about how the voices have affect him he states \"some days they make me so manic and I believe I do things that I haven't actually done\". He reports taking the buspirone \"to make them shut up, not to die\" and that it was not a suicide attempt. He endorses are desire to live and glad that he is alive.  Odell states the voices result in paranoia and often state \"how do I know\" when he tries to verify his memory and relationships.  He reports risperidone \"was working a little bit.... I don't know why they took it off\" and is amenable to increasing his risperidone dose. He states quetiapine was not " "helpful and the sedation from quetiapine \"makes it easier for voices to take over my mind\" and reports AH are worse when going to sleep or waking up.      His primary goal for this hospitalization is to \"lift the fog\" and get rid of auditory hallucinations.  \"whatever gets these visions out of my mind\"       See Admission note by  Maciej Norwood MD  on 10/03/2020  for additional details.          Diagnoses:   Principal Diagnosis:   # Schizophrenia     Secondary psychiatric diagnoses of concern this admission:   # none         Consults:     None         Hospital Course:   Diagnostic Impression: Joo Serrato is a 25 year old previously diagnosed with unspecified psychosis (schizophrenia vs BPAD) admitted from the  [facility] ED s/p intentional overdose on Buspar in the context of auditory hallucinations telling him to harm himself. Substance use did  not appear to be playing a contributing role in his presentation. The MSE on admission was notable for command auditory hallucinations, pressured speech and partial insight with reality testing.  His symptoms of 1 year history of persistent auditory hallucinations causing distress without major mood episodes and independent to substance use were consistent with a diagnosis of schizophrenia.      Hospital course: Joo Serrato was admitted to station 22 on a 72 hour hold. Risperidone was restarted and increased from 1 mg to 2 mg BID for psychosis. Trazodone 100 mg was continued for sleep. Zypreza 10 mg PO/IM was added as a PRN for severe agitation/psychosis.  Joo had a good response to the above treatment with a significant reduction in symptoms and improvement in thought process and function.    Joo Serrato was discharged to Home.  At the time of discharge Joo Serrato was determined to not be a danger to himself or others.    Medical Course:    # Leukocytosis--13.9 in ED on 10/02 (resolved--5.5 on 10/03)      Risk Assessment:  Joo OLMOS" Rojelio has notable risk factors for self-harm, including single status, psychosis and substance abuse. However, risk is mitigated by commitment to family, absence of past attempts and history of seeking help when needed.Additional steps taken to minimize risk include: Linking with an outpatient Psychiatry team. Therefore, based on all available evidence including the factors cited above, Joo Serrato does not appear to be at imminent risk for self-harm, and is appropriate for outpatient level of care.     This document serves as a transfer of care to Joo Serrato's outpatient providers.         Discharge Medications:     Current Discharge Medication List      CONTINUE these medications which have CHANGED    Details   loratadine (CLARITIN) 10 MG tablet Take 1 tablet (10 mg) by mouth daily  Qty: 30 tablet, Refills: 0    Associated Diagnoses: Seasonal allergic rhinitis due to other allergic trigger      risperiDONE (RISPERDAL) 2 MG tablet Take 1 tablet (2 mg) by mouth 2 times daily  Qty: 60 tablet, Refills: 0    Associated Diagnoses: Auditory hallucination         STOP taking these medications       QUEtiapine (SEROQUEL) 200 MG tablet Comments:   Reason for Stopping:                      Psychiatric Examination:   Appearance:  awake, alert, dressed in hospital scrubs and appeared as age stated  Attitude:  cooperative  Eye Contact:  good  Mood:  better  Affect:  appropriate and in normal range  Speech:  clear, coherent  Psychomotor Behavior:  no evidence of tardive dyskinesia, dystonia, or tics  Thought Process:  logical, linear and goal oriented  Associations:  no loose associations  Thought Content:  no evidence of suicidal ideation or homicidal ideation  Insight:  good  Judgment:  intact  Oriented to:  time, person, and place  Attention Span and Concentration:  intact  Recent and Remote Memory:  intact  Language: English with normal syntax  Fund of Knowledge: appropriate  Muscle Strength and Tone:  "appears normal  Gait and Station: Normal         Discharge Plan:   Health Care Follow-up Appointments:   Mental Health Follow Up  Therapist: Dequan Bey LP, PhD, [place] [id number]  [phone] fax [phone] -   Your therapist will assist in scheduling you to see a psychiatric medication provider via Telehealth through the clinic location.   Your mom will help you to make sure you schedule these appointments when you are discharged.     Attend all scheduled appointments with your outpatient providers. Call at least 24 hours in advance if you need to reschedule an appointment to ensure continued access to your outpatient providers.     Resources:   Crisis Intervention: 333.815.8185 or 945-310-9544 (TTY: 665.697.1220).  Call anytime for help.  [employer] [place] on Mental Illness (www.[place].naresh.org): 563.265.7353 or 206-322-6793.  Text 4 Life: txt \"LIFE\" to 49380 for immediate support and crisis intervention  Crisis text line: Text \"[place]\" to 464658. Free, confidential, .  Crisis Intervention: 648.994.7058 or 691-926-3276. Call anytime for help.   [facility] [place] [facility] Crisis Response Team (CRT):  [phone] or [phone]    Pt seen with and the case discussed with my attending, Dr Mark Saldaña- PGY 2        Attestation:   The patient has been seen and evaluated by me,  Haleigh Flores MD, using videoconferencing technology due to the COVID-19 pandemic. I have examined the patient today and reviewed the discharge plan with the resident and medical student. I agree with the final assessment and plan, as noted in the discharge summary. I have reviewed today's vital signs, medications, labs and imaging.  Total time discharge plannin minutes  Haleigh Flores MD                 Appendix A: All Labs This Admission:     Results for orders placed or performed during the hospital encounter of 10/02/20 "   CBC with platelets     Status: None   Result Value Ref Range    WBC 5.5 4.0 - 11.0 10e9/L    RBC Count 4.65 4.4 - 5.9 10e12/L    Hemoglobin 15.3 13.3 - 17.7 g/dL    Hematocrit 45.3 40.0 - 53.0 %    MCV 97 78 - 100 fl    MCH 32.9 26.5 - 33.0 pg    MCHC 33.8 31.5 - 36.5 g/dL    RDW 12.3 10.0 - 15.0 %    Platelet Count 192 150 - 450 10e9/L   EKG 12-lead, tracing only     Status: None   Result Value Ref Range    Interpretation ECG Click View Image link to view waveform and result

## 2020-10-05 NOTE — PLAN OF CARE
Patient reports anxiety on and off throughout day. He requests hydroxyzine which is not ordered. He accepted recommendation of sound machine. He reports this is helping him. He is hoping that he is discharged tomorrow. Lacks insight into illness. He is pleasant with full range affect. Social with staff and peers. Spends time  watching tv with others. Appetite is good . Denies SI and SIB. Gifty Cody RN

## 2020-10-05 NOTE — PLAN OF CARE
BEHAVIORAL TEAM DISCUSSION    Participants:   Haleigh Flores MD Attending Psychiatrist    Nisa Saldaña MD, PGY-2  Kaycee Martell, MSW, [facility] Clinical Treatment Coordinator  Shelby Ceballos, RN  Mason Bird, MS3  Jessica Tristan, MS3  Progress: improved since admission - adequate for discharge  Anticipated length of stay: discharge this evening  Continued Stay Criteria/Rationale: patient will discharge this evening.   Medical/Physical:  no acute needs  Precautions:   Behavioral Orders   Procedures     Code 1 - Restrict to Unit     Routine Programming     As clinically indicated     Self Injury Precaution     Status 15     Every 15 minutes.     Suicide precautions     Patients on Suicide Precautions should have a Combination Diet ordered that includes a Diet selection(s) AND a Behavioral Tray selection for Safe Tray - with utensils, or Safe Tray - NO utensils       Plan: patient has expressed ongoing desire to discharge - was admitted on a 72 hour hold - today appears to be organized, has intent to continue with medications  - collateral contact with family / mom - mom reports noticed significant improvement since admission and taking medications - she supports plan to discontinue hold and discharge to stay with her. She has been in contact with his mental lc therapist including about plan to have medication provider follow up via telehealth option with therapist office. Will  patient from the hospital when he is ready to discharge tonight.   Rationale for change in precautions or plan: per assessment    _____  I have reviewed this note. Haleigh Flores MD

## 2020-10-05 NOTE — DISCHARGE INSTRUCTIONS
Behavioral Discharge Planning and Instructions      Summary:  You were admitted on 10/2/2020  due to overdose in the context of symptoms of psychosis.  You were treated by Dr. Haleigh Flores MD and discharged on 10/5/2020 from [facility] to Home      Principal Diagnosis: schizophrenia       Health Care Follow-up Appointments:   Mental Health Follow Up  Therapist: Dequan Bey LP, PhD, [place] [id number]  [phone] fax [phone] -   Your therapist will assist in scheduling you to see a psychiatric medication provider via Telehealth through the clinic location.   Your mom will help you to make sure you schedule these appointments when you are discharged.     If no appointments scheduled, explain you will schedule with the clinic when you are home and have access to your scehdule.  Attend all scheduled appointments with your outpatient providers. Call at least 24 hours in advance if you need to reschedule an appointment to ensure continued access to your outpatient providers.   Major Treatments, Procedures and Findings:  You were provided with: a psychiatric assessment, assessed for medical stability, medication evaluation and/or management, group therapy and milieu management    Symptoms to Report: feeling more aggressive, increased confusion, losing more sleep, mood getting worse or thoughts of suicide    Early warning signs can include: increased depression or anxiety sleep disturbances increased thoughts or behaviors of suicide or self-harm  increased unusual thinking, such as paranoia or hearing voices    Safety and Wellness:  Take all medicines as directed.  Make no changes unless your doctor suggests them.      Follow treatment recommendations.  Refrain from alcohol and non-prescribed drugs. Take all medicines as directed.  Make no changes unless your doctor suggests them.      Follow treatment recommendations.  Refrain from alcohol and non-prescribed  "drugs.     Resources:   Crisis Intervention: 481.366.6555 or [phone] (TTY: 765.261.4336).  Call anytime for help.  National [place] on Mental Illness (www.mn.naresh.org): 784.553.1316 or 460-285-3422.  Text 4 Life: txt \"LIFE\" to 32916 for immediate support and crisis intervention  Crisis text line: Text \"[place]\" to 940980. Free, confidential, 24/7.  Crisis Intervention: 500.198.7468 or 842-212-4434. Call anytime for help.   [facility] [place] [facility] Mobile Crisis Response Team (CRT):  [phone] or [phone]       The treatment team has appreciated the opportunity to work with you.     If you have any questions or concerns our unit number is [phone]        "

## 2020-10-06 ENCOUNTER — TELEPHONE (OUTPATIENT)
Dept: FAMILY MEDICINE | Facility: OTHER | Age: 25
End: 2020-10-06

## 2020-10-06 NOTE — TELEPHONE ENCOUNTER
Reason for follow up call: oJo OLMOS Rojelio appeared on our list for being seen in and recenlty discharge from the Emergency Room/In Patient Hospital Admission.    Chief Complaint   Patient presents with     Hospital F/U     Auditory Hallucination       Encounter routed for Clinic Triage RN to call for follow up    TICO FlynnN, RN, PHN

## 2020-10-06 NOTE — TELEPHONE ENCOUNTER
ED / Discharge Outreach Protocol    Patient Contact    Attempt # 1    Was call answered?  No.  Unable to leave message.    Meghana Mcgarry, RN BSN

## 2020-10-07 NOTE — TELEPHONE ENCOUNTER
ED / Discharge Outreach Protocol    Patient Contact    Attempt # 2    Was call answered?  No.  Unable to leave message.    Aisha Murphy RN  October 7, 2020

## 2020-10-08 NOTE — TELEPHONE ENCOUNTER
"Hospital/TCU/ED for chronic condition Discharge Protocol    \"Hi, my name is Aisha Murphy RN, a registered nurse, and I am calling from [facility].  I am calling to follow up and see how things are going for you after your recent emergency visit/hospital/TCU stay.\"    Tell me how you are doing now that you are home?\" Patient states he is doing well. He is back to work and everything is going well.       Discharge Instructions    \"Let's review your discharge instructions.  What is/are the follow-up recommendations?  Pt. Response: Mental Health Follow Up  Therapist: Dequan Bey LP, PhD, [address] [phone] fax [phone] -   Your therapist will assist in scheduling you to see a psychiatric medication provider via Telehealth through the clinic location.   Your mom will help you to make sure you schedule these appointments when you are discharged.     \"Has an appointment with your primary care provider been scheduled?\"   No (schedule appointment)    \"When you see the provider, I would recommend that you bring your medications with you.\"    Medications    \"Tell me what changed about your medicines when you discharged?\"    Changes to chronic meds?      Current Discharge Medication List             CONTINUE these medications which have CHANGED     Details   loratadine (CLARITIN) 10 MG tablet Take 1 tablet (10 mg) by mouth daily  Qty: 30 tablet, Refills: 0     Associated Diagnoses: Seasonal allergic rhinitis due to other allergic trigger       risperiDONE (RISPERDAL) 2 MG tablet Take 1 tablet (2 mg) by mouth 2 times daily  Qty: 60 tablet, Refills: 0     Associated Diagnoses: Auditory hallucination                STOP taking these medications         QUEtiapine (SEROQUEL) 200 MG tablet Comments:   Reason for Stopping:                \"What questions do you have about your medications?\"    None     New diagnoses of heart failure, COPD, diabetes, or " "MI?    No              Post Discharge Medication Reconciliation Status: discharge medications reconciled, continue medications without change.    Was MTM referral placed (*Make sure to put transitions as reason for referral)?   No    Call Summary    \"What questions or concerns do you have about your recent visit and your follow-up care?\"     none    \"If you have questions or things don't continue to improve, we encourage you contact us through the main clinic number (give number).  Even if the clinic is not open, triage nurses are available 24/7 to help you.     We would like you to know that our clinic has extended hours (provide information).  We also have urgent care (provide details on closest location and hours/contact info)\"      \"Thank you for your time and take care!\"         Aisha Murphy RN  October 8, 2020      "

## 2020-12-23 DIAGNOSIS — R44.0 AUDITORY HALLUCINATION: ICD-10-CM

## 2020-12-23 NOTE — LETTER
[facility]  [address]  [place] [address]  Phone: [phone]    12/28/20    Joo Serrato  [address] [place] [address]      Dear Joo,      We have tried to reach you via phone without success.  We have filled your Risperdal for a short time only.  You are due for an office visit prior to any further refills.      Sincerely,      Hannah Santamaria MD

## 2020-12-24 RX ORDER — RISPERIDONE 1 MG/1
TABLET ORAL
Qty: 60 TABLET | Refills: 0 | Status: SHIPPED | OUTPATIENT
Start: 2020-12-24 | End: 2024-05-16

## 2020-12-24 NOTE — TELEPHONE ENCOUNTER
Pending Prescriptions:                       Disp   Refills    risperiDONE (RISPERDAL) 1 MG tablet [Pharm*60 tab*0        Sig: Take 1 tablet by mouth twice daily    Last Written Prescription Date:  10/5/20  Last Fill Quantity: 60,  # refills: 0   Last office visit: 12/2/2019 with prescribing provider:  Rosalio   Future Office Visit:      Routing refill request to provider for review/approval because:  Candelaria given x1 and patient did not follow up, please advise  Patient needs to be seen because it has been more than 1 year since last office visit.    Isabel Velázquez RN on 12/24/2020 at 9:41 AM

## 2020-12-28 NOTE — TELEPHONE ENCOUNTER
LM for patient to return phone call to clinic about message below.  Letter sent.  Rose Lara CMA ([facility])

## 2023-04-11 ENCOUNTER — HOSPITAL ENCOUNTER (EMERGENCY)
Facility: CLINIC | Age: 28
End: 2023-04-11
Payer: COMMERCIAL

## 2023-04-12 NOTE — ED NOTES
Called pt for triage x 2.  Registation reports that pt went to his car.  Will try to call one more time.

## 2024-02-22 ENCOUNTER — OFFICE VISIT (OUTPATIENT)
Dept: FAMILY MEDICINE | Facility: OTHER | Age: 29
End: 2024-02-22
Payer: MEDICAID

## 2024-02-22 VITALS
SYSTOLIC BLOOD PRESSURE: 118 MMHG | OXYGEN SATURATION: 96 % | HEIGHT: 72 IN | RESPIRATION RATE: 16 BRPM | BODY MASS INDEX: 34.81 KG/M2 | TEMPERATURE: 96.4 F | DIASTOLIC BLOOD PRESSURE: 80 MMHG | WEIGHT: 257 LBS | HEART RATE: 100 BPM

## 2024-02-22 DIAGNOSIS — F20.0 CHRONIC PARANOID SCHIZOPHRENIA (H): Primary | ICD-10-CM

## 2024-02-22 PROCEDURE — 99204 OFFICE O/P NEW MOD 45 MIN: CPT | Performed by: FAMILY MEDICINE

## 2024-02-22 RX ORDER — QUETIAPINE FUMARATE 50 MG/1
TABLET, FILM COATED ORAL
Qty: 29 TABLET | Refills: 1 | Status: SHIPPED | OUTPATIENT
Start: 2024-02-22 | End: 2024-05-16 | Stop reason: DRUGHIGH

## 2024-02-22 RX ORDER — QUETIAPINE FUMARATE 200 MG/1
200 TABLET, FILM COATED ORAL AT BEDTIME
Qty: 30 TABLET | Refills: 1 | Status: SHIPPED | OUTPATIENT
Start: 2024-02-22 | End: 2024-05-16

## 2024-02-22 RX ORDER — HYDROXYZINE PAMOATE 25 MG/1
25 CAPSULE ORAL 3 TIMES DAILY PRN
Qty: 90 CAPSULE | Refills: 1 | Status: SHIPPED | OUTPATIENT
Start: 2024-02-22

## 2024-02-22 ASSESSMENT — ANXIETY QUESTIONNAIRES
1. FEELING NERVOUS, ANXIOUS, OR ON EDGE: NEARLY EVERY DAY
6. BECOMING EASILY ANNOYED OR IRRITABLE: NEARLY EVERY DAY
IF YOU CHECKED OFF ANY PROBLEMS ON THIS QUESTIONNAIRE, HOW DIFFICULT HAVE THESE PROBLEMS MADE IT FOR YOU TO DO YOUR WORK, TAKE CARE OF THINGS AT HOME, OR GET ALONG WITH OTHER PEOPLE: EXTREMELY DIFFICULT
GAD7 TOTAL SCORE: 21
8. IF YOU CHECKED OFF ANY PROBLEMS, HOW DIFFICULT HAVE THESE MADE IT FOR YOU TO DO YOUR WORK, TAKE CARE OF THINGS AT HOME, OR GET ALONG WITH OTHER PEOPLE?: EXTREMELY DIFFICULT
7. FEELING AFRAID AS IF SOMETHING AWFUL MIGHT HAPPEN: NEARLY EVERY DAY
3. WORRYING TOO MUCH ABOUT DIFFERENT THINGS: NEARLY EVERY DAY
GAD7 TOTAL SCORE: 21
2. NOT BEING ABLE TO STOP OR CONTROL WORRYING: NEARLY EVERY DAY
5. BEING SO RESTLESS THAT IT IS HARD TO SIT STILL: NEARLY EVERY DAY
GAD7 TOTAL SCORE: 21
7. FEELING AFRAID AS IF SOMETHING AWFUL MIGHT HAPPEN: NEARLY EVERY DAY
4. TROUBLE RELAXING: NEARLY EVERY DAY

## 2024-02-22 ASSESSMENT — PATIENT HEALTH QUESTIONNAIRE - PHQ9
10. IF YOU CHECKED OFF ANY PROBLEMS, HOW DIFFICULT HAVE THESE PROBLEMS MADE IT FOR YOU TO DO YOUR WORK, TAKE CARE OF THINGS AT HOME, OR GET ALONG WITH OTHER PEOPLE: VERY DIFFICULT
SUM OF ALL RESPONSES TO PHQ QUESTIONS 1-9: 20
SUM OF ALL RESPONSES TO PHQ QUESTIONS 1-9: 20

## 2024-02-22 ASSESSMENT — ENCOUNTER SYMPTOMS: NERVOUS/ANXIOUS: 1

## 2024-02-22 ASSESSMENT — PAIN SCALES - GENERAL: PAINLEVEL: NO PAIN (0)

## 2024-02-22 NOTE — PATIENT INSTRUCTIONS
For now, resume Seroquel at night and at a lower dose in the morning.  Let me know if too much sedation.      OK to use hydroxyzine for anxiety as well.      Let's get you in with psychiatry as soon as we can.      If you haven't gotten into psychiatry before next month, then see me again.      Contact us or return if questions or concerns.    Have a nice day!    Dr. Valenzuela

## 2024-02-22 NOTE — PROGRESS NOTES
Assessment & Plan     Chronic paranoid schizophrenia (H)  Unfortunately, due to the problems with our current health system, patient has been off all antipsychotics for nearly a year.  His parents were able to prevail upon him to come to clinic to be seen today.  I discussed that I am a family physician and would recommend that he see a psychiatrist.  They were in agreement with this.  Since there may be a significant wait to get into psychiatry, I was willing to resume some of his medications.  Patient specifically requested hydroxyzine, and this felt reasonable to me.  Will refill this.  Since he previously seemed to do well on Seroquel, we will resume this.  Patient also was using this for sleep.  He did not like his injectable antipsychotic, so will increase his Seroquel from what he was previously taking initially.  Recommended follow-up with me in 1 month if he has been unable to get in with psychiatry at that time.  Happily, patient did not appear particularly paranoid at this visit.  He has somehow been coping with his hallucinations without too much difficulty.  He does appear to be somewhat exhausted by  these efforts.  - hydrOXYzine omer (VISTARIL) 25 MG capsule; Take 1 capsule (25 mg) by mouth 3 times daily as needed for anxiety  - Adult Mental Health [facility] Referral; Future  - QUEtiapine (SEROQUEL) 200 MG tablet; Take 1 tablet (200 mg) by mouth at bedtime  - QUEtiapine (SEROQUEL) 50 MG tablet; Take 0.5 tablets (25 mg) by mouth daily for 3 days, THEN 1 tablet (50 mg) daily for 27 days.      Portions of this note were completed using Dragon dictation software.  Although reviewed, there may be typographical and other inadvertent errors that remain.       Review of prior external note(s) from - psychiatry        Nicotine/Tobacco Cessation  He reports that he has been smoking cigarettes. He has been smoking an average of 1 pack per day. He has quit using smokeless tobacco.  His smokeless tobacco use  included chew.  Nicotine/Tobacco Cessation Plan  Information offered: Patient not interested at this time      BMI  Estimated body mass index is 34.86 kg/m  as calculated from the following:    Height as of this encounter: 1.829 m (6').    Weight as of this encounter: 116.6 kg (257 lb).   Weight management plan: Discussed healthy diet and exercise guidelines    Depression Screening Follow Up        2/22/2024    11:11 AM   PHQ   Q9: Thoughts of better off dead/self-harm past 2 weeks Several days                No data to display                  Follow Up      Follow Up Actions Taken  Crisis resource information provided in the After Visit Summary  Mental Health Referral placed    Discussed the following ways the patient can remain in a safe environment:  be around others    Patient Instructions   For now, resume Seroquel at night and at a lower dose in the morning.  Let me know if too much sedation.      OK to use hydroxyzine for anxiety as well.      Let's get you in with psychiatry as soon as we can.      If you haven't gotten into psychiatry before next month, then see me again.      Contact us or return if questions or concerns.    Have a nice day!    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           Pt is interested in resuming hydroxyzine and starting back on sleeping medications.      His mother states that he hears voices.  Pt denies this.      He was previously seeing psychiatry, but ran out of insurance.  He also didn't like getting shots.      He was previously taking Seroquel and  Invega with them.      Dad feels that he was improving, but then tried to get too aggressive at going back to work.      Chart review shows h/o schizophrenia.          12/10/2019     9:32 AM 2/22/2024    11:04 AM 2/22/2024    11:11 AM   PHQ   PHQ-9 Total Score 11 20    Q9: Thoughts of better off dead/self-harm past 2 weeks Not at all Several days Several days   F/U: Thoughts of suicide or self-harm  No    F/U: Safety concerns  No          12/2/2019    12:31 PM 12/10/2019     9:32 AM 2/22/2024    11:04 AM   NICO-7 SCORE   Total Score 18 (severe anxiety)  21 (severe anxiety)   Total Score 18 16 21               Objective    /80 (BP Location: Right arm, Patient Position: Sitting, Cuff Size: Adult Large)   Pulse 100   Temp (!) 96.4  F (35.8  C) (Temporal)   Resp 16   Ht 1.829 m (6')   Wt 116.6 kg (257 lb)   SpO2 96%   BMI 34.86 kg/m    Body mass index is 34.86 kg/m .  Physical Exam   GENERAL: alert and no distress  NECK: no adenopathy, no asymmetry, masses, or scars  RESP: lungs clear to auscultation - no rales, rhonchi or wheezes  CV: regular rate and rhythm, normal S1 S2, no S3 or S4, no murmur, click or rub, no peripheral edema  ABDOMEN: soft, nontender, no hepatosplenomegaly, no masses and bowel sounds normal  MS: no gross musculoskeletal defects noted, no edema    Admission on 10/02/2020, Discharged on 10/05/2020   Component Date Value Ref Range Status    Interpretation ECG 10/02/2020 Click View Image link to view waveform and result   Final    WBC 10/03/2020 5.5  4.0 - 11.0 10e9/L Final    RBC Count 10/03/2020 4.65  4.4 - 5.9 10e12/L Final    Hemoglobin 10/03/2020 15.3  13.3 - 17.7 g/dL Final    Hematocrit 10/03/2020 45.3  40.0 - 53.0 % Final    MCV 10/03/2020 97  78 - 100 fl Final    MCH 10/03/2020 32.9  26.5 - 33.0 pg Final    MCHC 10/03/2020 33.8  31.5 - 36.5 g/dL Final    RDW 10/03/2020 12.3  10.0 - 15.0 % Final    Platelet Count 10/03/2020 192  150 - 450 10e9/L Final     No results found for any  visits on 02/22/24.  No results found for this or any previous visit (from the past 24 hour(s)).        Signed Electronically by: Stone Valenzuela MD, MD

## 2024-05-16 ENCOUNTER — OFFICE VISIT (OUTPATIENT)
Dept: FAMILY MEDICINE | Facility: CLINIC | Age: 29
End: 2024-05-16
Payer: COMMERCIAL

## 2024-05-16 VITALS
DIASTOLIC BLOOD PRESSURE: 76 MMHG | OXYGEN SATURATION: 98 % | TEMPERATURE: 97.8 F | SYSTOLIC BLOOD PRESSURE: 116 MMHG | RESPIRATION RATE: 18 BRPM | BODY MASS INDEX: 35.21 KG/M2 | WEIGHT: 260 LBS | HEART RATE: 112 BPM | HEIGHT: 72 IN

## 2024-05-16 DIAGNOSIS — F51.04 PSYCHOPHYSIOLOGICAL INSOMNIA: ICD-10-CM

## 2024-05-16 DIAGNOSIS — Z51.81 ENCOUNTER FOR THERAPEUTIC DRUG MONITORING: ICD-10-CM

## 2024-05-16 DIAGNOSIS — Z13.220 LIPID SCREENING: ICD-10-CM

## 2024-05-16 DIAGNOSIS — F20.0 CHRONIC PARANOID SCHIZOPHRENIA (H): Primary | ICD-10-CM

## 2024-05-16 LAB
ALBUMIN SERPL BCG-MCNC: 4.2 G/DL (ref 3.5–5.2)
ALP SERPL-CCNC: 83 U/L (ref 40–150)
ALT SERPL W P-5'-P-CCNC: 20 U/L (ref 0–70)
ANION GAP SERPL CALCULATED.3IONS-SCNC: 10 MMOL/L (ref 7–15)
AST SERPL W P-5'-P-CCNC: 23 U/L (ref 0–45)
BILIRUB SERPL-MCNC: 0.7 MG/DL
BUN SERPL-MCNC: 7.9 MG/DL (ref 6–20)
CALCIUM SERPL-MCNC: 9.3 MG/DL (ref 8.6–10)
CHLORIDE SERPL-SCNC: 107 MMOL/L (ref 98–107)
CHOLEST SERPL-MCNC: 185 MG/DL
CREAT SERPL-MCNC: 0.85 MG/DL (ref 0.67–1.17)
CRP SERPL-MCNC: 17.12 MG/L
DEPRECATED HCO3 PLAS-SCNC: 22 MMOL/L (ref 22–29)
EGFRCR SERPLBLD CKD-EPI 2021: >90 ML/MIN/1.73M2
ERYTHROCYTE [DISTWIDTH] IN BLOOD BY AUTOMATED COUNT: 12.9 % (ref 10–15)
ERYTHROCYTE [SEDIMENTATION RATE] IN BLOOD BY WESTERGREN METHOD: 11 MM/HR (ref 0–15)
FASTING STATUS PATIENT QL REPORTED: YES
FASTING STATUS PATIENT QL REPORTED: YES
GLUCOSE SERPL-MCNC: 87 MG/DL (ref 70–99)
HCT VFR BLD AUTO: 47.8 % (ref 40–53)
HDLC SERPL-MCNC: 34 MG/DL
HGB BLD-MCNC: 16.7 G/DL (ref 13.3–17.7)
LDLC SERPL CALC-MCNC: 120 MG/DL
MCH RBC QN AUTO: 31 PG (ref 26.5–33)
MCHC RBC AUTO-ENTMCNC: 34.9 G/DL (ref 31.5–36.5)
MCV RBC AUTO: 89 FL (ref 78–100)
NONHDLC SERPL-MCNC: 151 MG/DL
PLATELET # BLD AUTO: 271 10E3/UL (ref 150–450)
POTASSIUM SERPL-SCNC: 4.1 MMOL/L (ref 3.4–5.3)
PROT SERPL-MCNC: 7.8 G/DL (ref 6.4–8.3)
RBC # BLD AUTO: 5.38 10E6/UL (ref 4.4–5.9)
SODIUM SERPL-SCNC: 139 MMOL/L (ref 135–145)
TRIGL SERPL-MCNC: 157 MG/DL
TSH SERPL DL<=0.005 MIU/L-ACNC: 1.96 UIU/ML (ref 0.3–4.2)
WBC # BLD AUTO: 10.9 10E3/UL (ref 4–11)

## 2024-05-16 PROCEDURE — 80053 COMPREHEN METABOLIC PANEL: CPT | Performed by: FAMILY MEDICINE

## 2024-05-16 PROCEDURE — 86140 C-REACTIVE PROTEIN: CPT | Performed by: FAMILY MEDICINE

## 2024-05-16 PROCEDURE — 36415 COLL VENOUS BLD VENIPUNCTURE: CPT | Performed by: FAMILY MEDICINE

## 2024-05-16 PROCEDURE — 85027 COMPLETE CBC AUTOMATED: CPT | Performed by: FAMILY MEDICINE

## 2024-05-16 PROCEDURE — 84443 ASSAY THYROID STIM HORMONE: CPT | Performed by: FAMILY MEDICINE

## 2024-05-16 PROCEDURE — G2211 COMPLEX E/M VISIT ADD ON: HCPCS | Performed by: FAMILY MEDICINE

## 2024-05-16 PROCEDURE — 85652 RBC SED RATE AUTOMATED: CPT | Performed by: FAMILY MEDICINE

## 2024-05-16 PROCEDURE — 80061 LIPID PANEL: CPT | Performed by: FAMILY MEDICINE

## 2024-05-16 PROCEDURE — 99214 OFFICE O/P EST MOD 30 MIN: CPT | Performed by: FAMILY MEDICINE

## 2024-05-16 PROCEDURE — 86038 ANTINUCLEAR ANTIBODIES: CPT | Performed by: FAMILY MEDICINE

## 2024-05-16 PROCEDURE — 86780 TREPONEMA PALLIDUM: CPT | Performed by: FAMILY MEDICINE

## 2024-05-16 RX ORDER — QUETIAPINE FUMARATE 200 MG/1
TABLET, FILM COATED ORAL
Qty: 270 TABLET | Refills: 0 | Status: SHIPPED | OUTPATIENT
Start: 2024-05-16

## 2024-05-16 ASSESSMENT — PAIN SCALES - GENERAL: PAINLEVEL: NO PAIN (0)

## 2024-05-16 NOTE — PROGRESS NOTES
Assessment & Plan       ICD-10-CM    1. Chronic paranoid schizophrenia (H)  F20.0 Adult Mental Health  Referral     QUEtiapine (SEROQUEL) 200 MG tablet     CBC with platelets     Comprehensive metabolic panel (BMP + Alb, Alk Phos, ALT, AST, Total. Bili, TP)     Lipid panel reflex to direct LDL Fasting     TSH with free T4 reflex     ESR: Erythrocyte sedimentation rate     Treponema Abs w Reflex to RPR and Titer     CRP, inflammation     Anti Nuclear Ira IgG by IFA with Reflex     CBC with platelets     Comprehensive metabolic panel (BMP + Alb, Alk Phos, ALT, AST, Total. Bili, TP)     Lipid panel reflex to direct LDL Fasting     TSH with free T4 reflex     ESR: Erythrocyte sedimentation rate     Treponema Abs w Reflex to RPR and Titer     CRP, inflammation     Anti Nuclear Ira IgG by IFA with Reflex      2. Psychophysiological insomnia  F51.04       3. Encounter for therapeutic drug monitoring  Z51.81 CBC with platelets     Comprehensive metabolic panel (BMP + Alb, Alk Phos, ALT, AST, Total. Bili, TP)     Lipid panel reflex to direct LDL Fasting     TSH with free T4 reflex     ESR: Erythrocyte sedimentation rate     Treponema Abs w Reflex to RPR and Titer     CRP, inflammation     Anti Nuclear Ira IgG by IFA with Reflex     CBC with platelets     Comprehensive metabolic panel (BMP + Alb, Alk Phos, ALT, AST, Total. Bili, TP)     Lipid panel reflex to direct LDL Fasting     TSH with free T4 reflex     ESR: Erythrocyte sedimentation rate     Treponema Abs w Reflex to RPR and Titer     CRP, inflammation     Anti Nuclear Ira IgG by IFA with Reflex      4. Lipid screening  Z13.220 Lipid panel reflex to direct LDL Fasting     Lipid panel reflex to direct LDL Fasting           Patient is still not well-controlled.  He is still having hallucinations.  Unfortunately, they did not get in with psychiatry and did not follow-up as soon as I had recommended.  He is not doing well.  Will again refer to psychiatry.   Additionally, will significantly increase his Seroquel to see if this helps with his hallucinations and sleep.  Will check some baseline labs and other screening labs as well.  Not controlled.  Continue hydroxyzine.  Will increase Seroquel at night.  Follow-up if not responding.  Offered trazodone, but patient did not feel this was any more effective for him than the other medications.  Will check labs to monitor effects of his antipsychotics and to do some other screenings related to his schizophrenia.  Screening ordered.      Portions of this note were completed using Dragon dictation software.  Although reviewed, there may be typographical and other inadvertent errors that remain.                 Patient Instructions   Let me know if you don't hear from psychiatry by next week.    Increase Seroquel to 400 mg at night and 200 mg in the morning.    We will let you know your results as soon as we can.  If you have MyChart, you will be able to see your lab and imaging results shortly after they become available.  I will review these and let you know my interpretation, but this usually takes additional time.  If you have multiple labs, I usually wait until they are all back before sending a note about them unless something is worrisome.  If you do not have MyChart, we will let you know your lab results as soon as we can.  If they are normal, this can take up to a week.     Consider getting your immunizations updated at some point.      Contact us or return if questions or concerns.    Have a nice day!    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  He exercises with enough effort to increase his heart rate 3 or less days per week.   He is taking medications regularly.     Pt hasn't noted any clear improvement in his symptoms.  He does have some benefit from hydroxyzine and Seroquel.      Still not sleeping as well as he'd like.      Can still be awake for days at a time with the medication.      Never heard from psychiatry.      Pt is still smoking about 1 ppd.              Objective    /76 (Cuff Size: Adult Regular)   Pulse 112   Temp 97.8  F (36.6  C) (Temporal)   Resp 18   Ht 1.829 m (6')   Wt 117.9 kg (260 lb)   SpO2 98%   BMI 35.26 kg/m    Body mass index is 35.26 kg/m .  Physical Exam   GENERAL: alert and no distress  NECK: no adenopathy, no asymmetry, masses, or scars  RESP: lungs clear to auscultation - no rales, rhonchi or wheezes  CV: regular rate and rhythm, normal S1 S2, no S3 or S4, no murmur, click or rub, no peripheral edema  ABDOMEN: soft, nontender, no hepatosplenomegaly, no masses and bowel sounds normal  MS: no gross musculoskeletal defects noted, no edema    Admission on 10/02/2020, Discharged on 10/05/2020   Component Date Value Ref Range Status    Interpretation ECG 10/02/2020 Click View Image link to view waveform and result   Final    WBC 10/03/2020 5.5  4.0 - 11.0 10e9/L Final    RBC Count 10/03/2020 4.65  4.4 - 5.9 10e12/L Final    Hemoglobin 10/03/2020 15.3  13.3 - 17.7 g/dL Final    Hematocrit 10/03/2020 45.3  40.0 - 53.0 % Final    MCV 10/03/2020 97  78 - 100 fl Final    MCH 10/03/2020 32.9  26.5 - 33.0 pg Final    MCHC 10/03/2020 33.8  31.5 - 36.5 g/dL Final    RDW 10/03/2020 12.3  10.0 - 15.0 % Final    Platelet Count 10/03/2020 192  150 - 450 10e9/L Final     No results found for any visits on 05/16/24.  No results found for this or any previous visit (from the past 24 hour(s)).        Signed Electronically by: Stone Valenzuela MD, MD

## 2024-05-16 NOTE — PATIENT INSTRUCTIONS
Let me know if you don't hear from psychiatry by next week.    Increase Seroquel to 400 mg at night and 200 mg in the morning.    We will let you know your results as soon as we can.  If you have MyChart, you will be able to see your lab and imaging results shortly after they become available.  I will review these and let you know my interpretation, but this usually takes additional time.  If you have multiple labs, I usually wait until they are all back before sending a note about them unless something is worrisome.  If you do not have MyChart, we will let you know your lab results as soon as we can.  If they are normal, this can take up to a week.     Consider getting your immunizations updated at some point.      Contact us or return if questions or concerns.    Have a nice day!    Dr. Valenzuela

## 2024-05-17 LAB — T PALLIDUM AB SER QL: NONREACTIVE

## 2024-05-20 LAB — ANA SER QL IF: NEGATIVE

## 2024-05-21 NOTE — RESULT ENCOUNTER NOTE
Almost all your labs are completely normal.  Your cholesterol numbers could be a bit better.  Try to increase your activity level, reduce saturated fats in your diet, and strive for healthy weight.  I would recommend checking this every few years.    One of your inflammatory markers was very mildly elevated, but this is not specific for anything worrisome.  All of your other labs were normal.    Have a nice day!    Dr. Valenzuela

## 2025-03-06 ENCOUNTER — HOSPITAL ENCOUNTER (EMERGENCY)
Facility: CLINIC | Age: 30
Discharge: HOME | End: 2025-03-06
Attending: FAMILY MEDICINE | Admitting: FAMILY MEDICINE
Payer: COMMERCIAL

## 2025-03-06 VITALS
DIASTOLIC BLOOD PRESSURE: 86 MMHG | OXYGEN SATURATION: 98 % | SYSTOLIC BLOOD PRESSURE: 142 MMHG | BODY MASS INDEX: 40.91 KG/M2 | RESPIRATION RATE: 30 BRPM | HEART RATE: 123 BPM | WEIGHT: 292.2 LBS | TEMPERATURE: 99 F | HEIGHT: 71 IN

## 2025-03-06 DIAGNOSIS — F20.0 CHRONIC PARANOID SCHIZOPHRENIA (H): ICD-10-CM

## 2025-03-06 DIAGNOSIS — F20.0 PARANOID SCHIZOPHRENIA (H): ICD-10-CM

## 2025-03-06 PROBLEM — F20.9 SCHIZOPHRENIA (H): Status: ACTIVE | Noted: 2020-07-03

## 2025-03-06 PROCEDURE — 99283 EMERGENCY DEPT VISIT LOW MDM: CPT | Performed by: FAMILY MEDICINE

## 2025-03-06 PROCEDURE — 250N000013 HC RX MED GY IP 250 OP 250 PS 637: Performed by: FAMILY MEDICINE

## 2025-03-06 PROCEDURE — 99283 EMERGENCY DEPT VISIT LOW MDM: CPT

## 2025-03-06 RX ORDER — NICOTINE 21 MG/24HR
1 PATCH, TRANSDERMAL 24 HOURS TRANSDERMAL DAILY
Status: DISCONTINUED | OUTPATIENT
Start: 2025-03-06 | End: 2025-03-06 | Stop reason: HOSPADM

## 2025-03-06 RX ORDER — QUETIAPINE FUMARATE 200 MG/1
TABLET, FILM COATED ORAL
Qty: 30 TABLET | Refills: 0 | Status: SHIPPED | OUTPATIENT
Start: 2025-03-06

## 2025-03-06 RX ORDER — QUETIAPINE FUMARATE 100 MG/1
200 TABLET, FILM COATED ORAL ONCE
Status: COMPLETED | OUTPATIENT
Start: 2025-03-06 | End: 2025-03-06

## 2025-03-06 RX ADMIN — Medication 1 PATCH: at 10:02

## 2025-03-06 RX ADMIN — QUETIAPINE 200 MG: 100 TABLET ORAL at 12:17

## 2025-03-06 ASSESSMENT — ACTIVITIES OF DAILY LIVING (ADL)
ADLS_ACUITY_SCORE: 41

## 2025-03-06 ASSESSMENT — COLUMBIA-SUICIDE SEVERITY RATING SCALE - C-SSRS
2. HAVE YOU ACTUALLY HAD ANY THOUGHTS OF KILLING YOURSELF IN THE PAST MONTH?: NO
1. IN THE PAST MONTH, HAVE YOU WISHED YOU WERE DEAD OR WISHED YOU COULD GO TO SLEEP AND NOT WAKE UP?: NO
6. HAVE YOU EVER DONE ANYTHING, STARTED TO DO ANYTHING, OR PREPARED TO DO ANYTHING TO END YOUR LIFE?: NO

## 2025-03-06 NOTE — DISCHARGE INSTRUCTIONS
Upcoming Appointments:   Date: Thursday, 3/13/2025  Time: 10:00 am - 10:59 am  Provider: Khadra Muñoz  MSN  CNP,RN  Location: [facility], [address] [place] [address] [address] [facility] [address], [place] [address]  Phone: [phone]  Type: Telepsychiatry  Patient instructions: Please have the patient check their email for an invitation from [facility]. The patients will receive documents to complete before their appointments. The paperwork should be completed no later than 24 hours before so the provider has time to review it. They may call our office at [phone] or email us at [email or web address] and one of our providers or administrative assistants will contact the patient with any questions, or need to reschedule. A credit card must be on file.        Additional Mental Health Resource    [facility]   [address] [place] [address]  [phone]  [email or web address]

## 2025-03-06 NOTE — ED PROVIDER NOTES
[facility] ED Provider Note   Patient: Joo Serrato  MRN #:  [id number]  Date of Visit: March 6, 2025    CC:     Chief Complaint   Patient presents with    Mental Health Problem     HPI:  Joo Serrato is a 29 year old male with history of chronic paranoid schizophrenia, with auditory hallucinations, and recent decompensation due to medication noncompliance.  Patient is accompanied by his father whom he lives with for the past 2 years.  Patient missed a couple of psychiatry appointments through [facility], ran out of medications 3 weeks ago, and has had some increase paranoia and hallucinations.  Patient is not able to sleep, and only getting a couple of hours at night.  Patient denies any suicidal or homicidal ideation or plans.  The voices are not telling him to hurt himself or others.  His father states that he has been more withdrawn, and isolating himself.  When the father comes into the room, the patient will leave the room.  Patient tried to get an appointment with [facility] and was told that because of his 2 missed appointments, he would not be able to get another appointment for a year.    Problem List:  Patient Active Problem List    Diagnosis Date Noted    Psychophysiological insomnia 05/16/2024     Priority: Medium    Chronic paranoid schizophrenia (H) 10/03/2020     Priority: Medium    Suicide attempt (H) 10/02/2020     Priority: Medium    Psychosis (H) 07/03/2020     Priority: Medium    Auditory hallucination 12/02/2019     Priority: Medium    Sprain of medial collateral ligament of knee 10/21/2013     Priority: Medium    Encopresis 01/29/2004     Priority: Medium     Problem list name updated by automated process. Provider to review      Urinary incontinence 01/29/2004     Priority: Medium     Problem list name updated by automated process. Provider to review         History reviewed. No pertinent  "past medical history.    MEDS: QUEtiapine (SEROQUEL) 200 MG tablet  hydrOXYzine omer (VISTARIL) 25 MG capsule        ALLERGIES:    Allergies   Allergen Reactions    No Known Drug Allergy        Past Surgical History:   Procedure Laterality Date    TONSILLECTOMY & ADENOIDECTOMY  02/13/2007       Social History     Tobacco Use    Smoking status: Every Day     Current packs/day: 1.00     Types: Cigarettes    Smokeless tobacco: Former     Types: Chew   Vaping Use    Vaping status: Former   Substance Use Topics    Alcohol use: No    Drug use: No         Review of Systems   Except as noted in HPI, all other systems were reviewed and are negative    Physical Exam   Vitals were reviewed  Patient Vitals for the past 8 hrs:   BP Temp Temp src Pulse Resp SpO2 Height Weight   03/06/25 0921 (!) 142/86 99  F (37.2  C) Temporal (!) 123 30 98 % 1.805 m (5' 11.08\") 132.5 kg (292 lb 3.2 oz)     GENERAL APPEARANCE: Alert, poor eye contact Insight glances.  FACE: normal facies  EYES: Pupils are equal  HENT: normal external exam  RESP: normal respiratory effort;  NEURO: no facial droop; no focal deficits, speech is normal  PSYCH: Patient admits to auditory hallucinations.      Available Lab/Imaging Results   No results found for this or any previous visit (from the past 24 hours).          Impression     Final diagnoses:   Paranoid schizophrenia (H)         ED Course & Medical Decision Making   Joo Serrato is a 29 year old male with known chronic paranoid schizophrenia who presented to the emergency department with recent noncompliance of medication, and a couple of missed appointments with psychiatry, leaving him untreated for the last 3 weeks.  Patient ran out of medications and was unable to get a refill.  He has not been sleeping except for couple of hours at a time.  He appeared to be experiencing more auditory hallucinations.  He denies any suicidal homicidal ideation or plans.  There is no command hallucinations.  Patient " was brought in by his father whom he lives with for the past 2 years.  Patient's parents are involved, and mom is more involved with appointments.  They are unable to get back to see [facility] for another year due to his 2 missed appointments.    Vital signs reveal a temp of 90 degrees, blood pressure 147/86, heart rate of 123, respiration 30, 98% oxygen saturation.  Patient is slightly restless but able to lay in bed.  Eye contact is inconsistent/variable.    Patient completed a diagnostic evaluation visit.  Please see consult note.  Patient was not interested in inpatient admission.  Additional resources were provided.  An appointment was made through the [facility] health services for visit with a psychiatry provider.  In the meantime we will have the patient resume his Seroquel 200 mg in the morning, and 40 mg in the evening.  I visited with the patient and his father.  They felt extremely comfortable with this plan.  See discharge instructions below.          Written after-visit summary and instructions were given at the time of discharge.      Discharge Instructions:     Upcoming Appointments:   Date: Thursday, 3/13/2025  Time: 10:00 am - 10:59 am  Provider: Khadra LUNDBERG  CNP,RN  Location: [place] [address]  Phone: [phone]  Type: Telepsychiatry  Patient instructions: Please have the patient check their email for an invitation from [facility]. The patients will receive documents to complete before their appointments. The paperwork should be completed no later than 24 hours before so the provider has time to review it. They may call our office at [phone] or email us at [email or web address] and one of our providers or administrative assistants will contact the patient with any questions, or need to reschedule. A credit card must be on file.    Additional Mental Health Resource    [facility]  Adult Mental Health   [address] [place] [address]  [phone]  [email or web address]       Disclaimer: This note consists of words and symbols derived from keyboarding and dictation using voice recognition software.  As a result, there may be errors that have gone undetected.  Please consider this when interpreting information found in this note.       Kamini Morales MD  03/06/25 7844

## 2025-03-06 NOTE — CONSULTS
"Diagnostic Evaluation Consultation  Crisis Assessment    Patient Name: Joo Serrato  Age:  29 year old  Legal Sex: male  Gender Identity: male  Pronouns:   Race: White  Ethnicity: Not  or   Language: English      Patient was assessed: Virtual: Venafi   Crisis Assessment Start Date: 03/06/25  Crisis Assessment Start Time: 1118  Crisis Assessment Stop Time: 1159  Patient location: [facility] Emergency Dept                                 Referral Data and Chief Complaint  Joo Serrato presents to the ED with family/friends. Patient is presenting to the ED for the following concerns: Other (see comment) (auditory hallucinations). Factors that make the mental health crisis life threatening or complex are: Pt reports his father brought him to the ED and pt reports concerns that he didn't sleep last night. Pt's father was present during interview and pt consented to him being present. Pt states he needs medication so he can sleep better.  When asked how many hours of sleep he's been getting, he reports 8-9 but that he won't get to sleep until 2am-3am, but last night wasn't able to sleep.   Pt endorses that he's experiencing auditory hallucinations and reports that he hears voices.  He states the voices say \"random stuff...defamation and lies\".  He denies they are command in nature.  He reports he's been hearing voices for the past 2 years.  Pt denies visual hallucinations and denies SI/HI/SIB.  Pt presents as very distractible, often needing to have writer repeat questions.  He is observed to be responding to internal stimuli as he is moving his lips as if quietly talking to himself.  Pt is calm and cooperative throughout interview and participates in questioning, though provides brief answers..      Informed Consent and Assessment Methods  Explained the crisis assessment process, including applicable information disclosures and limits to confidentiality, assessed understanding " of the process, and obtained consent to proceed with the assessment.  Assessment methods included conducting a formal interview with patient, review of medical records, collaboration with medical staff, and obtaining relevant collateral information from family and community providers when available.  : done     History of the Crisis   Pt denies previous diagnoses however chart review notes previous diagnosis of Schizophrenia.  Pt reports that he's been prescribed medication for sleep and anxiety.  He states he had been seeing a psychiatrist through [facility], however missed his last two appointments and is not able to schedule again.  Pt reports he ran out of his medications about 3 weeks ago.  He reports he'd been taking, Trazodone, Seroquel and another medication for anxiety.  Per chart review, pt had a previous [facility] [place] [facility] hospitalization in 2020 after intentional overdose of pills due to voices commanding him to do so.  Pt denied previous SI/SIB/SA.  Pt's father reports he's had a decline in functioning where he's not able to maintain focus and has been withdrawing/isolating more as well as talking to himself.  Pt's father does not have any safety concerns regarding pt wanting to harm himself or others.  He reports pt has had trouble with medication compliance and at one point was receiving injections which seemed to be effective.    Brief Psychosocial History  Family:  Single, Children no  Support System:  Parent(s)  Employment Status:  unemployed  Source of Income:  none  Financial Environmental Concerns:  unable to afford rent/mortgage, unemployed  Current Hobbies:  exercise/fitness  Barriers in Personal Life:  mental health concerns    Significant Clinical History  Current Anxiety Symptoms:   (pt denies)  Current Depression/Trauma:  difficulty concentrating, irritable  Current Somatic Symptoms:   (denies)  Current Psychosis/Thought Disturbance:  distractability, agitation  Current Eating Symptoms:   increased appetite  Chemical Use History:  Alcohol: None  Benzodiazepines: None  Opiates: None  Cocaine: None  Marijuana: None  Other Use: None  Withdrawal Symptoms:  (n/a)  Addictions:  (n/a)   Past diagnosis:  Schizophrenia  Family history:  Substance Use Disorder, Bipolar Disorder  Past treatment:  Psychiatric Medication Management  Details of most recent treatment:  none  Other relevant history:  none    Have there been any medication changes in the past two weeks:  patient is not on psychiatric meds       Is the patient compliant with medications:   (n/a)        Collateral Information  Is there collateral information: Yes     Collateral information name, relationship, phone number:  Ashwin Saucedo, father, [phone], was present during interview    What happened today: Ashwin reports pt had missed a few psychiatry appointments and ran out of his medications 3 weeks ago.  Pt's mother called ashwin this morning saying pt was accusing that the voices were his parents' fault, so he decided to bring him into the ED.     What is different about patient's functioning: Ashwin reports pt is isolating more and withdrawing more, has trouble focusing, trouble functioning. He reports pt had  previously been on shots because of medication non-compliance.     What do you think the patient needs:  medications    Has patient made comments about wanting to kill themselves/others: no    If d/c is recommended, can they take part in safety/aftercare planning:  yes    Additional collateral information:        Risk Assessment  [place] Suicide Severity Rating Scale Full Clinical Version:  Suicidal Ideation  Q1 Wish to be Dead (Lifetime): Yes  Q2 Non-Specific Active Suicidal Thoughts (Lifetime): Yes  3. Active Suicidal Ideation with any Methods (Not Plan) Without Intent to Act (Lifetime): Yes  4. Active Suicidal Ideation with Some Intent to Act, Without Specific Plan (Lifetime): Yes  5. Active Suicidal Ideation with Specific Plan and  Intent (Lifetime): Yes  Q6 Suicide Behavior (Lifetime): yes  Intensity of Ideation (Lifetime)  Most Severe Ideation Rating (Lifetime): 5  Frequency (Lifetime):  (unknown, pt denies past SI, however chart indicate past SA)  Duration (Lifetime):  (unknown, pt denies past SI, however chart indicate past SA)  Controllability (Lifetime):  (unknown, pt denies past SI, however chart indicate past SA)  Deterrents (Lifetime):  (unknown, pt denies past SI, however chart indicate past SA)  Reasons for Ideation (Lifetime):  (unknown, pt denies past SI, however chart indicate past SA)  Suicidal Behavior (Lifetime)  Actual Attempt (Lifetime): Yes  Total Number of Actual Attempts (Lifetime): 1  Actual Attempt Description (Lifetime): 2020 took overdose of mother's Buspar, following command of auditory hallucinations  Has subject engaged in non-suicidal self-injurious behavior? (Lifetime): No  Interrupted Attempts (Lifetime): Yes  Total Number of Interrupted Attempts (Lifetime): 1  Interrupted Attempt Description (Lifetime): pt's mother interrupted taking the pills  Aborted or Self-Interrupted Attempt (Lifetime): No  Preparatory Acts or Behavior (Lifetime): No    [place] Suicide Severity Rating Scale Recent:   Suicidal Ideation (Recent)  Q1 Wished to be Dead (Past Month): no  Q2 Suicidal Thoughts (Past Month): no  Level of Risk per Screen: no risks indicated     Suicidal Behavior (Recent)  Actual Attempt (Past 3 Months): No  Has subject engaged in non-suicidal self-injurious behavior? (Past 3 Months): No  Interrupted Attempts (Past 3 Months): No  Aborted or Self-Interrupted Attempt (Past 3 Months): No  Preparatory Acts or Behavior (Past 3 Months): No    Environmental or Psychosocial Events: neither working nor attending school  Protective Factors: Protective Factors: strong bond to family unit, community support, or employment, able to access care without barriers, lives in a responsibly safe and stable environment, sense of  belonging    Does the patient have thoughts of harming others? Feels Like Hurting Others: no  Previous Attempt to Hurt Others: no  Current presentation:  (pt presents as calm and cooperative)  Is the patient engaging in sexually inappropriate behavior?: no  Does Patient have a known history of aggressive behavior: No  Has aggression occurred as a result of  concerns/diagnosis: no  Does patient have history of aggression in hospital: no    Is the patient engaging in sexually inappropriate behavior?  no        Mental Status Exam   Affect: Constricted  Appearance: Disheveled  Attention Span/Concentration: Inattentive  Eye Contact: Variable    Fund of Knowledge: Delayed   Language /Speech Content: Fluent  Language /Speech Volume: Normal  Language /Speech Rate/Productions: Normal, Minimally Responsive  Recent Memory: Variable  Remote Memory: Variable  Mood: Normal  Orientation to Person: Yes   Orientation to Place: Yes  Orientation to Time of Day: Yes  Orientation to Date: No     Situation (Do they understand why they are here?): Yes  Psychomotor Behavior: Normal  Thought Content: Hallucinations  Thought Form: Intact          Medication  Psychotropic medications:   Medication Orders - Psychiatric (From admission, onward)      Start     Dose/Rate Route Frequency Ordered Stop    03/06/25 1000  nicotine (NICODERM CQ) 14 MG/24HR 24 hr patch 1 patch         1 patch  over 24 Hours Transdermal DAILY 03/06/25 0956      03/06/25 0000  QUEtiapine (SEROQUEL) 200 MG tablet          Oral Multiple Frequencies 03/06/25 1207               Current Care Team  Patient Care Team:  No Ref-Primary, Physician as PCP - Stone Perea MD as Assigned PCP    Diagnosis  Patient Active Problem List   Diagnosis Code    Encopresis R15.9    Urinary incontinence R32    Sprain of medial collateral ligament of knee S83.419A    Auditory hallucination R44.0    Schizophrenia (H) F20.9    Suicide attempt (H) T14.91XA    Chronic paranoid  schizophrenia (H) F20.0    Psychophysiological insomnia F51.04       Primary Problem This Admission  Active Hospital Problems    *Schizophrenia (H), F20.9        Clinical Summary and Substantiation of Recommendations   Clinical Substantiation:  Pt presents to the ED due to auditory hallucinations, sleep disturbance and running out of mental health medications.  Pt has a history of Schizophrenia and had been taking medications through a psychiatrist, however missed his last two appointments and subsequently ran out of his medications and is not able to schedule again with his provider.  Pt reports he's been without medications for the past 3 weeks.  Pt reports he hears voices and denies they are command in nature.  He denies visual hallucinations.  Pt lives with his parents and his father reports pt has been having difficulty with functioning as he's not able to focus effectively.  Pt is observed to be distractible and responding to internal stimuli as he's moving his lips as if quietly talking to himself, between answering questions.  Pt denies SI/SIB/HI.  Pt is not agreeable to inpatient hospitalization for stabilization.  His father is agreeable to addressing symptoms through outpatient appointment and does not feel he's a safety risk and will be able to provide support for him.  Pt does not appear to be a safety risk as he's not endorsing desire to harm himself or others and is not experiencing command hallucinations.  Pt was scheduled for medication management appointment and resource was also provided for [facility] adult mental health services for any additional services that could be helpful for pt.    Goals for crisis stabilization:  Assessing safety and establishing care plan    Next steps for Care Team:  Pt will call provider to confirm appointment and any additional steps needed to participate in virtual appointment.    Treatment Objectives Addressed:  rapport building, identifying and practicing coping  strategies, assessing safety, identifying additional supports    Therapeutic Interventions:  Engaged in safety planning, Worked on relapse prevention planning (review of stressors, early warning signs, written plan to respond to signs, and rehearse plan).    Has a specific means been identified for suicidal/homicide actions: No      Patient coping skills attempted to reduce the crisis:  Pt came to ED    Disposition  Recommended referrals: Medication Management        Reviewed case and recommendations with attending provider. Attending Name: Dr. Morales       Attending concurs with disposition: yes       Patient and/or validated legal guardian concurs with disposition:   yes       Final disposition:  discharge                            Legal status: Voluntary/Patient has signed consent for treatment                                                                                                                                 Reviewed court records: yes       Assessment Details   Total duration spent with the patient: 41 min     CPT code(s) utilized: 54844 - Psychotherapy for Crisis - 60 (30-74*) min    Jocelyn Kehr Sparby, LPCC, FIDELC, Psychotherapist  [facility] - Triage & Transition Services  Callback: [phone]

## 2025-03-06 NOTE — ED TRIAGE NOTES
"    GRECIA called the Patient's [facility]  Michael with [facility] [phone]. Michael informed Grecia that the patient ran away from his assisted living in [place] without the [facility] [phone].\"       The patient's Navya called. GRECIA GRECIA provided the [facility] phone number to patient.   " Pt reports symptoms of schizophrenia, hearing voices, states he is safe and not a harm to self or others; ran out of medications approx 3 weeks ago, missed therapy and is unable to see [facility] for ine year.      Triage Assessment (Adult)       Row Name 03/06/25 0924          Triage Assessment    Airway WDL WDL        Respiratory WDL    Respiratory WDL rhythm/pattern;X     Rhythm/Pattern, Respiratory tachypneic        Skin Circulation/Temperature WDL    Skin Circulation/Temperature WDL WDL

## 2025-03-06 NOTE — PLAN OF CARE
Joo Serrato  March 6, 2025  Plan of Care Hand-off Note     Patient Recommended Care Path: discharge    Clinical Substantiation:  Pt presents to the ED due to auditory hallucinations, sleep disturbance and running out of mental health medications.  Pt has a history of Schizophrenia and had been taking medications through a psychiatrist, however missed his last two appoitnments and subsequently ran out of his medications and is not able to schedule again with his provider.  Pt reports he's been without medications for the past 3 weeks.  Pt reports he hears voices and denies they are command in nature.  He denies visual hallucinations.  Pt lives with his parents and his father reports pt has been having difficulty with functioning as he's not able to focus effectively.  Pt is observed to be distractible and responding to internal stimuli as he's moving his lips as if quietly talking to himself, between answering questions.  Pt denies SI/SIB/HI.  Pt is not agreeable to inpatient hospitalization for stabilization.  His father is agreeable to addressing symptoms through outpatient appointment and does not feel he's a safety risk and will be able to provide support for him.  Pt does not appear to be a safety risk as he's not endorsing dsire to harm himself or others and is not experiencing command hallucinations.  Pt was scheduled for medication management appointment and resource was also provided for [facility] adult mental health services for any additional services that could be helpful for pt.    Goals for crisis stabilization:  Assessing safety and establishing care plan    Next steps for Care Team:  Pt will call provider to confirm appointment and any additional steps needed to participate in virtual appointment.    Treatment Objectives Addressed:  rapport building, identifying and practicing coping strategies, assessing safety, identifying additional supports    Therapeutic Interventions:  Engaged in safety  planning, Worked on relapse prevention planning (review of stressors, early warning signs, written plan to respond to signs, and rehearse plan).    Has a specific means been identified for suicidal.homicide actions: No    Patient coping skills attempted to reduce the crisis:  Pt came to ED             Collateral contact information:  Gilbert Serrato, father, [phone], was present during interview    Legal Status: Voluntary/Patient has signed consent for treatment                                                                                                                                 Reviewed court records: yes     Psychiatry Consult:     Jocelyn Kehr Sparby, HARDEEPC, LADC

## 2025-03-13 DIAGNOSIS — F20.0 PARANOID SCHIZOPHRENIA (H): ICD-10-CM

## 2025-03-13 DIAGNOSIS — Z11.59 NEED FOR HEPATITIS C SCREENING TEST: ICD-10-CM

## 2025-03-13 DIAGNOSIS — Z11.4 SCREENING FOR HIV (HUMAN IMMUNODEFICIENCY VIRUS): Primary | ICD-10-CM

## 2025-04-08 ENCOUNTER — LAB (OUTPATIENT)
Dept: LAB | Facility: CLINIC | Age: 30
End: 2025-04-08
Payer: COMMERCIAL

## 2025-04-08 DIAGNOSIS — F20.0 PARANOID SCHIZOPHRENIA (H): ICD-10-CM

## 2025-04-08 DIAGNOSIS — Z11.4 SCREENING FOR HIV (HUMAN IMMUNODEFICIENCY VIRUS): ICD-10-CM

## 2025-04-08 DIAGNOSIS — Z11.59 NEED FOR HEPATITIS C SCREENING TEST: ICD-10-CM

## 2025-04-08 LAB
ALBUMIN SERPL BCG-MCNC: 4.5 G/DL (ref 3.5–5.2)
ALBUMIN UR-MCNC: 10 MG/DL
ALP SERPL-CCNC: 88 U/L (ref 40–150)
ALT SERPL W P-5'-P-CCNC: 26 U/L (ref 0–70)
AMPHETAMINES UR QL SCN: NORMAL
ANION GAP SERPL CALCULATED.3IONS-SCNC: 10 MMOL/L (ref 7–15)
APPEARANCE UR: CLEAR
AST SERPL W P-5'-P-CCNC: 24 U/L (ref 0–45)
BARBITURATES UR QL SCN: NORMAL
BASOPHILS # BLD AUTO: 0.1 10E3/UL (ref 0–0.2)
BASOPHILS NFR BLD AUTO: 1 %
BENZODIAZ UR QL SCN: NORMAL
BILIRUB SERPL-MCNC: 0.4 MG/DL
BILIRUB UR QL STRIP: NEGATIVE
BUN SERPL-MCNC: 6.6 MG/DL (ref 6–20)
BZE UR QL SCN: NORMAL
CALCIUM SERPL-MCNC: 9.9 MG/DL (ref 8.8–10.4)
CANNABINOIDS UR QL SCN: NORMAL
CHLORIDE SERPL-SCNC: 106 MMOL/L (ref 98–107)
CHOLEST SERPL-MCNC: 167 MG/DL
COLOR UR AUTO: YELLOW
CREAT SERPL-MCNC: 0.94 MG/DL (ref 0.67–1.17)
EGFRCR SERPLBLD CKD-EPI 2021: >90 ML/MIN/1.73M2
EOSINOPHIL # BLD AUTO: 0.5 10E3/UL (ref 0–0.7)
EOSINOPHIL NFR BLD AUTO: 5 %
ERYTHROCYTE [DISTWIDTH] IN BLOOD BY AUTOMATED COUNT: 12.3 % (ref 10–15)
FASTING STATUS PATIENT QL REPORTED: YES
FASTING STATUS PATIENT QL REPORTED: YES
FENTANYL UR QL: NORMAL
FOLATE SERPL-MCNC: 7.4 NG/ML (ref 4.6–34.8)
GLUCOSE SERPL-MCNC: 87 MG/DL (ref 70–99)
GLUCOSE UR STRIP-MCNC: NEGATIVE MG/DL
HCO3 SERPL-SCNC: 28 MMOL/L (ref 22–29)
HCT VFR BLD AUTO: 48.1 % (ref 40–53)
HDLC SERPL-MCNC: 26 MG/DL
HGB BLD-MCNC: 17 G/DL (ref 13.3–17.7)
HGB UR QL STRIP: NEGATIVE
IMM GRANULOCYTES # BLD: 0 10E3/UL
IMM GRANULOCYTES NFR BLD: 0 %
KETONES UR STRIP-MCNC: NEGATIVE MG/DL
LDLC SERPL CALC-MCNC: 78 MG/DL
LEUKOCYTE ESTERASE UR QL STRIP: NEGATIVE
LYMPHOCYTES # BLD AUTO: 3.5 10E3/UL (ref 0.8–5.3)
LYMPHOCYTES NFR BLD AUTO: 35 %
MCH RBC QN AUTO: 31.4 PG (ref 26.5–33)
MCHC RBC AUTO-ENTMCNC: 35.3 G/DL (ref 31.5–36.5)
MCV RBC AUTO: 89 FL (ref 78–100)
MONOCYTES # BLD AUTO: 0.9 10E3/UL (ref 0–1.3)
MONOCYTES NFR BLD AUTO: 8 %
MUCOUS THREADS #/AREA URNS LPF: PRESENT /LPF
NEUTROPHILS # BLD AUTO: 5.1 10E3/UL (ref 1.6–8.3)
NEUTROPHILS NFR BLD AUTO: 51 %
NITRATE UR QL: NEGATIVE
NONHDLC SERPL-MCNC: 141 MG/DL
NRBC # BLD AUTO: 0 10E3/UL
NRBC BLD AUTO-RTO: 0 /100
OPIATES UR QL SCN: NORMAL
PCP QUAL URINE (ROCHE): NORMAL
PH UR STRIP: 6.5 [PH] (ref 5–7)
PLATELET # BLD AUTO: 306 10E3/UL (ref 150–450)
POTASSIUM SERPL-SCNC: 3.7 MMOL/L (ref 3.4–5.3)
PROT SERPL-MCNC: 7.6 G/DL (ref 6.4–8.3)
RBC # BLD AUTO: 5.41 10E6/UL (ref 4.4–5.9)
RBC URINE: 0 /HPF
SODIUM SERPL-SCNC: 144 MMOL/L (ref 135–145)
SP GR UR STRIP: 1.02 (ref 1–1.03)
TRIGL SERPL-MCNC: 317 MG/DL
TSH SERPL DL<=0.005 MIU/L-ACNC: 1.65 UIU/ML (ref 0.3–4.2)
UROBILINOGEN UR STRIP-MCNC: 3 MG/DL
WBC # BLD AUTO: 10.1 10E3/UL (ref 4–11)
WBC URINE: 0 /HPF

## 2025-04-08 PROCEDURE — 86803 HEPATITIS C AB TEST: CPT

## 2025-04-08 PROCEDURE — 82607 VITAMIN B-12: CPT

## 2025-04-08 PROCEDURE — 87389 HIV-1 AG W/HIV-1&-2 AB AG IA: CPT

## 2025-04-08 PROCEDURE — 80307 DRUG TEST PRSMV CHEM ANLYZR: CPT

## 2025-04-08 PROCEDURE — 84443 ASSAY THYROID STIM HORMONE: CPT

## 2025-04-08 PROCEDURE — 82746 ASSAY OF FOLIC ACID SERUM: CPT

## 2025-04-08 PROCEDURE — 82306 VITAMIN D 25 HYDROXY: CPT

## 2025-04-08 PROCEDURE — 85025 COMPLETE CBC W/AUTO DIFF WBC: CPT

## 2025-04-08 PROCEDURE — 36415 COLL VENOUS BLD VENIPUNCTURE: CPT

## 2025-04-08 PROCEDURE — 80053 COMPREHEN METABOLIC PANEL: CPT

## 2025-04-08 PROCEDURE — 80061 LIPID PANEL: CPT

## 2025-04-09 LAB
HCV AB SERPL QL IA: NONREACTIVE
HIV 1+2 AB+HIV1 P24 AG SERPL QL IA: NONREACTIVE
VIT B12 SERPL-MCNC: 676 PG/ML (ref 232–1245)
VIT D+METAB SERPL-MCNC: 19 NG/ML (ref 20–50)

## 2025-06-20 ENCOUNTER — HOSPITAL ENCOUNTER (EMERGENCY)
Facility: CLINIC | Age: 30
Discharge: ANOTHER HOSPITAL | End: 2025-06-21
Attending: STUDENT IN AN ORGANIZED HEALTH CARE EDUCATION/TRAINING PROGRAM | Admitting: STUDENT IN AN ORGANIZED HEALTH CARE EDUCATION/TRAINING PROGRAM
Payer: COMMERCIAL

## 2025-06-20 DIAGNOSIS — F23 ACUTE PSYCHOSIS (H): ICD-10-CM

## 2025-06-20 DIAGNOSIS — F20.0 PARANOID SCHIZOPHRENIA (H): ICD-10-CM

## 2025-06-20 LAB
ALBUMIN SERPL BCG-MCNC: 4.2 G/DL (ref 3.5–5.2)
ALP SERPL-CCNC: 80 U/L (ref 40–150)
ALT SERPL W P-5'-P-CCNC: 24 U/L (ref 0–70)
AMPHETAMINES UR QL SCN: NORMAL
ANION GAP SERPL CALCULATED.3IONS-SCNC: 8 MMOL/L (ref 7–15)
AST SERPL W P-5'-P-CCNC: 22 U/L (ref 0–45)
BARBITURATES UR QL SCN: NORMAL
BASOPHILS # BLD AUTO: 0.1 10E3/UL (ref 0–0.2)
BASOPHILS NFR BLD AUTO: 1 %
BENZODIAZ UR QL SCN: NORMAL
BILIRUB SERPL-MCNC: 0.4 MG/DL
BUN SERPL-MCNC: 8.4 MG/DL (ref 6–20)
BZE UR QL SCN: NORMAL
CALCIUM SERPL-MCNC: 9.3 MG/DL (ref 8.8–10.4)
CANNABINOIDS UR QL SCN: NORMAL
CHLORIDE SERPL-SCNC: 100 MMOL/L (ref 98–107)
CREAT SERPL-MCNC: 0.87 MG/DL (ref 0.67–1.17)
EGFRCR SERPLBLD CKD-EPI 2021: >90 ML/MIN/1.73M2
EOSINOPHIL # BLD AUTO: 0.5 10E3/UL (ref 0–0.7)
EOSINOPHIL NFR BLD AUTO: 5 %
ERYTHROCYTE [DISTWIDTH] IN BLOOD BY AUTOMATED COUNT: 12.7 % (ref 10–15)
ETHANOL SERPL-MCNC: <0.01 G/DL
FENTANYL UR QL: NORMAL
GLUCOSE SERPL-MCNC: 103 MG/DL (ref 70–99)
HCO3 SERPL-SCNC: 28 MMOL/L (ref 22–29)
HCT VFR BLD AUTO: 48 % (ref 40–53)
HGB BLD-MCNC: 16.7 G/DL (ref 13.3–17.7)
IMM GRANULOCYTES # BLD: 0.1 10E3/UL
IMM GRANULOCYTES NFR BLD: 1 %
LYMPHOCYTES # BLD AUTO: 4.4 10E3/UL (ref 0.8–5.3)
LYMPHOCYTES NFR BLD AUTO: 45 %
MAGNESIUM SERPL-MCNC: 2 MG/DL (ref 1.7–2.3)
MCH RBC QN AUTO: 31.3 PG (ref 26.5–33)
MCHC RBC AUTO-ENTMCNC: 34.8 G/DL (ref 31.5–36.5)
MCV RBC AUTO: 90 FL (ref 78–100)
MONOCYTES # BLD AUTO: 0.8 10E3/UL (ref 0–1.3)
MONOCYTES NFR BLD AUTO: 8 %
NEUTROPHILS # BLD AUTO: 4.1 10E3/UL (ref 1.6–8.3)
NEUTROPHILS NFR BLD AUTO: 41 %
NRBC # BLD AUTO: 0 10E3/UL
NRBC BLD AUTO-RTO: 0 /100
OPIATES UR QL SCN: NORMAL
PCP QUAL URINE (ROCHE): NORMAL
PLAT MORPH BLD: NORMAL
PLATELET # BLD AUTO: 282 10E3/UL (ref 150–450)
POTASSIUM SERPL-SCNC: 4.1 MMOL/L (ref 3.4–5.3)
PROT SERPL-MCNC: 7.4 G/DL (ref 6.4–8.3)
RBC # BLD AUTO: 5.34 10E6/UL (ref 4.4–5.9)
RBC MORPH BLD: NORMAL
SODIUM SERPL-SCNC: 136 MMOL/L (ref 135–145)
TROPONIN T SERPL HS-MCNC: <6 NG/L
WBC # BLD AUTO: 9.9 10E3/UL (ref 4–11)

## 2025-06-20 PROCEDURE — 83735 ASSAY OF MAGNESIUM: CPT | Performed by: STUDENT IN AN ORGANIZED HEALTH CARE EDUCATION/TRAINING PROGRAM

## 2025-06-20 PROCEDURE — 250N000013 HC RX MED GY IP 250 OP 250 PS 637: Performed by: STUDENT IN AN ORGANIZED HEALTH CARE EDUCATION/TRAINING PROGRAM

## 2025-06-20 PROCEDURE — 82077 ASSAY SPEC XCP UR&BREATH IA: CPT | Performed by: STUDENT IN AN ORGANIZED HEALTH CARE EDUCATION/TRAINING PROGRAM

## 2025-06-20 PROCEDURE — 80307 DRUG TEST PRSMV CHEM ANLYZR: CPT | Performed by: STUDENT IN AN ORGANIZED HEALTH CARE EDUCATION/TRAINING PROGRAM

## 2025-06-20 PROCEDURE — 36415 COLL VENOUS BLD VENIPUNCTURE: CPT | Performed by: STUDENT IN AN ORGANIZED HEALTH CARE EDUCATION/TRAINING PROGRAM

## 2025-06-20 PROCEDURE — 82310 ASSAY OF CALCIUM: CPT | Performed by: STUDENT IN AN ORGANIZED HEALTH CARE EDUCATION/TRAINING PROGRAM

## 2025-06-20 PROCEDURE — 99285 EMERGENCY DEPT VISIT HI MDM: CPT | Performed by: STUDENT IN AN ORGANIZED HEALTH CARE EDUCATION/TRAINING PROGRAM

## 2025-06-20 PROCEDURE — 84484 ASSAY OF TROPONIN QUANT: CPT | Performed by: STUDENT IN AN ORGANIZED HEALTH CARE EDUCATION/TRAINING PROGRAM

## 2025-06-20 PROCEDURE — 250N000013 HC RX MED GY IP 250 OP 250 PS 637: Performed by: FAMILY MEDICINE

## 2025-06-20 PROCEDURE — 85004 AUTOMATED DIFF WBC COUNT: CPT | Performed by: STUDENT IN AN ORGANIZED HEALTH CARE EDUCATION/TRAINING PROGRAM

## 2025-06-20 RX ORDER — OLANZAPINE 5 MG/1
10 TABLET, ORALLY DISINTEGRATING ORAL AT BEDTIME
Status: DISCONTINUED | OUTPATIENT
Start: 2025-06-20 | End: 2025-06-21 | Stop reason: HOSPADM

## 2025-06-20 RX ORDER — OLANZAPINE 2.5 MG/1
5 TABLET, FILM COATED ORAL
Status: COMPLETED | OUTPATIENT
Start: 2025-06-20 | End: 2025-06-20

## 2025-06-20 RX ADMIN — OLANZAPINE 10 MG: 5 TABLET, ORALLY DISINTEGRATING ORAL at 20:57

## 2025-06-20 RX ADMIN — OLANZAPINE 5 MG: 2.5 TABLET, FILM COATED ORAL at 23:10

## 2025-06-20 RX ADMIN — OLANZAPINE 10 MG: 5 TABLET, ORALLY DISINTEGRATING ORAL at 06:26

## 2025-06-20 RX ADMIN — NICOTINE POLACRILEX 2 MG: 2 GUM, CHEWING BUCCAL at 19:47

## 2025-06-20 RX ADMIN — NICOTINE POLACRILEX 2 MG: 2 GUM, CHEWING BUCCAL at 20:59

## 2025-06-20 ASSESSMENT — COLUMBIA-SUICIDE SEVERITY RATING SCALE - C-SSRS
MOST LETHAL DATE: 65379
6. HAVE YOU EVER DONE ANYTHING, STARTED TO DO ANYTHING, OR PREPARED TO DO ANYTHING TO END YOUR LIFE?: NO
2. HAVE YOU ACTUALLY HAD ANY THOUGHTS OF KILLING YOURSELF?: NO
TOTAL  NUMBER OF INTERRUPTED ATTEMPTS SINCE LAST CONTACT: NO
1. SINCE LAST CONTACT, HAVE YOU WISHED YOU WERE DEAD OR WISHED YOU COULD GO TO SLEEP AND NOT WAKE UP?: NO
6. HAVE YOU EVER DONE ANYTHING, STARTED TO DO ANYTHING, OR PREPARED TO DO ANYTHING TO END YOUR LIFE?: NO
TOTAL  NUMBER OF ABORTED OR SELF INTERRUPTED ATTEMPTS SINCE LAST CONTACT: NO
2. HAVE YOU ACTUALLY HAD ANY THOUGHTS OF KILLING YOURSELF?: NO
ATTEMPT SINCE LAST CONTACT: NO
TOTAL  NUMBER OF INTERRUPTED ATTEMPTS SINCE LAST CONTACT: NO
SUICIDE, SINCE LAST CONTACT: NO
1. IN THE PAST MONTH, HAVE YOU WISHED YOU WERE DEAD OR WISHED YOU COULD GO TO SLEEP AND NOT WAKE UP?: NO
ATTEMPT SINCE LAST CONTACT: NO
1. SINCE LAST CONTACT, HAVE YOU WISHED YOU WERE DEAD OR WISHED YOU COULD GO TO SLEEP AND NOT WAKE UP?: NO
TOTAL  NUMBER OF ABORTED OR SELF INTERRUPTED ATTEMPTS SINCE LAST CONTACT: NO
SUICIDE, SINCE LAST CONTACT: NO
2. HAVE YOU ACTUALLY HAD ANY THOUGHTS OF KILLING YOURSELF IN THE PAST MONTH?: NO

## 2025-06-20 ASSESSMENT — ACTIVITIES OF DAILY LIVING (ADL)
ADLS_ACUITY_SCORE: 41

## 2025-06-20 NOTE — ED NOTES
Patient given a prepackaged muffin in an attempt to get him to eat..he accepted the samir continuie to monor=itor.

## 2025-06-20 NOTE — ED TRIAGE NOTES
Triage Assessment (Adult)       Row Name 06/20/25 0601          Triage Assessment    Airway WDL WDL        Respiratory WDL    Respiratory WDL WDL                   Hx of schizophrenia.  Feels like a eel is crawling inside.

## 2025-06-20 NOTE — ED PROVIDER NOTES
"     Emergency Department Patient Sign-out       Brief HPI:     Pt signed out to me by the previous er physician. Reviewed previous documentation and work up.     Dec has evaluated pt, recommended inpatient mental health. Pt is agreeable.  Patient was monitored closely during the rest of his time in the ER until transport was arranged.  Signed out to the oncoming ER physician.      Exam:   Patient Vitals for the past 24 hrs:   BP Temp Temp src Pulse Resp SpO2   06/20/25 1846 128/75 -- -- 72 20 98 %   06/20/25 0713 -- 98  F (36.7  C) Oral -- -- --   06/20/25 0600 (!) 142/90 -- Oral 104 24 98 %           ED RESULTS:   Results for orders placed or performed during the hospital encounter of 06/20/25 (from the past 24 hours)   [facility] ([facility] Assessment Consult Order:     Status: None ()    Collection Time: 06/20/25  6:06 AM    Narrative    Kehr Sparby, Jocelyn, HARDEEPC, LADC     6/20/2025  8:26 AM  Diagnostic Evaluation Consultation  Crisis Assessment    Patient Name: Joo Serrato  Age:  30 year old  Legal Sex: male  Gender Identity: male  Pronouns:      Race: White  Ethnicity: Not  or   Language: English      Patient was assessed: Virtual: [employer] [facility]   Crisis Assessment Start Date: 06/20/25  Crisis Assessment Start Time: 0714  Crisis Assessment Stop Time: 0728  Patient location: [facility] Emergency Dept                             ED04    Referral Data and Chief Complaint  Joo Serrato presents to the ED with family/friends.   Patient is presenting to the ED for the following concerns: Other   (see comment) (delusions, tactile hallucinations). Factors that   make the mental health crisis life threatening or complex are:   When writer presented on iPad for interview and exchanged   greeting and introduction pt stated, \"I have something dripping   down my back right now\" and continued to say that he heard a pop   and thinks that something might be wrong with his " "spinal chord.    Pt goes on to say \"When I breathe it's making a weird noise\" and   that he thinks he might be dying and a bug is crawling out of his   throat.  Pt denies SI/SIB/HI.  He denies experiencing auditory or   visual hallucinations.  Pt presents as anxious and perseverating   on a bug crawling out of his throat throughout interview, often   answering questions with statements or questions regarding this.    Due to pt's inability to participate effectively, history was   taken from DEC assessment completed on 3/6/2025.  Pt's father was   present during ED admission and admission notes regarding   father's collateral read, \"patient has been gradually   decompensating over the last few weeks. He has been getting very   poor sleep and has been declining his nightly   Seroquel/hydroxyzine. Patient has reportedly been making unusual   statements. Father states that he had gotten benefit in the past   by receiving scheduled injections, but does not seem to be doing   well with current outpatient therapy regimen\"..      Informed Consent and Assessment Methods  Explained the crisis assessment process, including applicable   information disclosures and limits to confidentiality, assessed   understanding of the process, and obtained consent to proceed   with the assessment.  Assessment methods included conducting a   formal interview with patient, review of medical records,   collaboration with medical staff, and obtaining relevant   collateral information from family and community providers when   available.  : done     History of the Crisis   Pt has a history of Schizophrenia. He's had one previous [facility]   hospitalization in 2020 after an interrupted overdose attempt of   taking his mother's Buspirone due to command auditory   hallucinations.  Pt presented to ED on 3/6/25 due to being   without his medications for 3 weeks and decompensation.  At that   time, pt was responding to internal stimuli and experiencing " "  auditory and visual hallucinations.  Pt and family were wanting   to address by scheduling outpatient medication management   appointment.  Pt states he is taking his medications   \"somewhat...not that often\".  Upon further questioning pt reports   he hadn't taken his medications in a few weeks.  Pt denies any   difficulty with sleep or appetite.  He reports he is not working   and is not able to communicate what he is doing with his time at   home.  Pt denies substance use.    Brief Psychosocial History  Family:  Single, Children no  Support System:  Parent(s)  Employment Status:  unemployed  Source of Income:  none  Financial Environmental Concerns:  unable to afford rent/mortgage  Current Hobbies:  exercise/fitness  Barriers in Personal Life:  mental health concerns    Significant Clinical History  Current Anxiety Symptoms:  anxious  Current Depression/Trauma:  difficulty concentrating  Current Somatic Symptoms:  anxious  Current Psychosis/Thought Disturbance:  distractability, tactile   hallucinations, inattentive  Current Eating Symptoms:   (denies)  Chemical Use History:  Alcohol: None  Benzodiazepines: None  Opiates: None  Cocaine: None  Marijuana: None  Other Use: None  Withdrawal Symptoms:  (n/a)  Addictions:  (none reported)   Past diagnosis:  Schizophrenia  Family history:  Substance Use Disorder, Bipolar Disorder  Past treatment:  Psychiatric Medication Management  Details of most recent treatment:  Psychiatry within the past 3   months, unknown last appointment  Other relevant history:  none noted    Have there been any medication changes in the past two weeks:  no         Is the patient compliant with medications:  no  pt not taking   medications     Collateral Information  Is there collateral information: No (left voicemail for Gilbert,   father, [phone])     Collateral information name, relationship, phone number:       What happened today:       What is different about patient's functioning:   "     What do you think the patient needs:      Has patient made comments about wanting to kill   themselves/others:      If d/c is recommended, can they take part in safety/aftercare   planning:       Additional collateral information:        Risk Assessment  Davie Suicide Severity Rating Scale Full Clinical Version:  Suicidal Ideation  Q1 Wish to be Dead (Lifetime): Yes  Q2 Non-Specific Active Suicidal Thoughts (Lifetime): Yes  3. Active Suicidal Ideation with any Methods (Not Plan) Without   Intent to Act (Lifetime): Yes  4. Active Suicidal Ideation with Some Intent to Act, Without   Specific Plan (Lifetime): Yes  5. Active Suicidal Ideation with Specific Plan and Intent   (Lifetime): Yes  Q6 Suicide Behavior (Lifetime): yes  Intensity of Ideation (Lifetime)  Most Severe Ideation Rating (Lifetime): 5  Suicidal Behavior (Lifetime)  Actual Attempt (Lifetime): Yes  Total Number of Actual Attempts (Lifetime): 1  Actual Attempt Description (Lifetime): 2020 took an overdose of   mother's Buspar following command of auditory hallucinations  Has subject engaged in non-suicidal self-injurious behavior?   (Lifetime): No  Interrupted Attempts (Lifetime): Yes  Total Number of Interrupted Attempts (Lifetime): 1  Interrupted Attempt Description (Lifetime): pt's mother   interrupted taking the pills  Aborted or Self-Interrupted Attempt (Lifetime): No  Preparatory Acts or Behavior (Lifetime): No    Davie Suicide Severity Rating Scale Recent:   Suicidal Ideation (Recent)  Q1 Wished to be Dead (Past Month): no  Q2 Suicidal Thoughts (Past Month): no  Level of Risk per Screen: no risks indicated     Suicidal Behavior (Recent)  Actual Attempt (Past 3 Months): No  Has subject engaged in non-suicidal self-injurious behavior?   (Past 3 Months): No  Interrupted Attempts (Past 3 Months): No  Aborted or Self-Interrupted Attempt (Past 3 Months): No  Preparatory Acts or Behavior (Past 3 Months): No    Environmental or Psychosocial  Events: neither working nor   attending school, unemployment/underemployment  Protective Factors: Protective Factors: strong bond to family   unit, community support, or employment, lives in a responsibly   safe and stable environment, able to access care without barriers    Does the patient have thoughts of harming others? Feels Like   Hurting Others: no  Previous Attempt to Hurt Others: no  Current presentation:  (pt presents as anxious, cooperative)  Is the patient engaging in sexually inappropriate behavior?: no  Does Patient have a known history of aggressive behavior: No  Has aggression occurred as a result of MH concerns/diagnosis: no  Does patient have history of aggression in hospital: no    Is the patient engaging in sexually inappropriate behavior?  no          Mental Status Exam   Affect: Constricted  Appearance: Disheveled  Attention Span/Concentration: Inattentive  Eye Contact: Variable    Fund of Knowledge: Delayed   Language /Speech Content: Fluent  Language /Speech Volume: Normal  Language /Speech Rate/Productions: Normal  Recent Memory: Variable  Remote Memory: Variable  Mood: Anxious  Orientation to Person: Yes   Orientation to Place: Yes  Orientation to Time of Day: Yes  Orientation to Date: No     Situation (Do they understand why they are here?): Yes  Psychomotor Behavior: Normal  Thought Content: Delusions  Thought Form: Tangential       Medication  Psychotropic medications:   Medication Orders - Psychiatric (From admission, onward)      Start     Dose/Rate Route Frequency Ordered Stop    06/20/25 0610  OLANZapine zydis (zyPREXA) ODT tab 10 mg         10 mg Oral AT BEDTIME 06/20/25 0604               Current Care Team  Patient Care Team:  No Ref-Primary, Physician as PCP - Stone Perea MD as Assigned PCP    Diagnosis  Patient Active Problem List   Diagnosis Code    Encopresis R15.9    Urinary incontinence R32    Sprain of medial collateral ligament of knee S83.419A     Auditory hallucination R44.0    Schizophrenia (H) F20.9    Suicide attempt (H) T14.91XA    Chronic paranoid schizophrenia (H) F20.0    Psychophysiological insomnia F51.04       Primary Problem This Admission  Active Hospital Problems    *Schizophrenia (H), F20.9        Clinical Summary and Substantiation of Recommendations   Clinical Substantiation:  Pt presents to ED for decompensation of   mental health due to not taking medications.  He is experiencing   delusions and tactile hallucinations, thinking a bug is crawling   out of his throat and reporting other physical sensations like   something is dripping down his back.  Pt has a history of   Schizophrenia and one previous hospitalization in 2020 due to   command hallucinations, instructing him to take an overdose of   pills which was interrupted.  Pt is denying current auditory or   visual hallucinations but has a history of experiencing.  Pt   denies SI/SIB/HI.  Due to pt's decompensation and not able to   adequately care for himself and significant impairment in   functioning, it is recommended pt be admitted for inpatient   psychiatric hospitalization.    Goals for crisis stabilization:  Stabilization of symptoms which   most likely will require medication intervention.    Next steps for Care Team:  Pt has been placed on inpatient   placement list.    Treatment Objectives Addressed:  assessing safety    Therapeutic Interventions:   n/a    Has a specific means been identified for suicidal/homicide   actions: No      Patient coping skills attempted to reduce the crisis:  no coping   skills applied.    Disposition  Recommended referrals: Medication Management        Reviewed case and recommendations with attending provider.   Attending Name: Dr. Garcia       Attending concurs with disposition: yes       Patient and/or validated legal guardian concurs with disposition:     yes       Final disposition:  inpatient mental health                            Legal  status: Voluntary/Patient has signed consent for treatment                                                                                                                                 Reviewed court records: yes       Assessment Details   Total duration spent with the patient: 14 min     CPT code(s) utilized: Non-Billable    Jocelyn Kehr Sparby, HARDEEPC, LADC, Psychotherapist  [facility] - Triage & Transition Services  Callback: [phone]          CBC with platelets differential     Status: None    Collection Time: 06/20/25  6:23 AM    Narrative    The following orders were created for panel order CBC with platelets differential.  Procedure                               Abnormality         Status                     ---------                               -----------         ------                     CBC with platelets and ...[[id number]]                      Final result               RBC and Platelet Morpho...[[id number]]                      Final result                 Please view results for these tests on the individual orders.   Comprehensive metabolic panel     Status: Abnormal    Collection Time: 06/20/25  6:23 AM   Result Value Ref Range    Sodium 136 135 - 145 mmol/L    Potassium 4.1 3.4 - 5.3 mmol/L    Carbon Dioxide (CO2) 28 22 - 29 mmol/L    Anion Gap 8 7 - 15 mmol/L    Urea Nitrogen 8.4 6.0 - 20.0 mg/dL    Creatinine 0.87 0.67 - 1.17 mg/dL    GFR Estimate >90 >60 mL/min/1.73m2    Calcium 9.3 8.8 - 10.4 mg/dL    Chloride 100 98 - 107 mmol/L    Glucose 103 (H) 70 - 99 mg/dL    Alkaline Phosphatase 80 40 - 150 U/L    AST 22 0 - 45 U/L    ALT 24 0 - 70 U/L    Protein Total 7.4 6.4 - 8.3 g/dL    Albumin 4.2 3.5 - 5.2 g/dL    Bilirubin Total 0.4 <=1.2 mg/dL   Troponin T, High Sensitivity     Status: Normal    Collection Time: 06/20/25  6:23 AM   Result Value Ref Range    Troponin T, High Sensitivity <6 <=22 ng/L   Magnesium     Status: Normal    Collection Time: 06/20/25  6:23 AM   Result Value Ref Range     Magnesium 2.0 1.7 - 2.3 mg/dL   Ethanol Level Blood     Status: Normal    Collection Time: 06/20/25  6:23 AM   Result Value Ref Range    Ethanol Level Blood <0.01 <=0.01 g/dL   Urine Drug Screen     Status: Normal    Collection Time: 06/20/25  6:23 AM    Narrative    The following orders were created for panel order Urine Drug Screen.  Procedure                               Abnormality         Status                     ---------                               -----------         ------                     Urine Drug Screen Panel[[id number]]     Normal              Final result                 Please view results for these tests on the individual orders.   CBC with platelets and differential     Status: None    Collection Time: 06/20/25  6:23 AM   Result Value Ref Range    WBC Count 9.9 4.0 - 11.0 10e3/uL    RBC Count 5.34 4.40 - 5.90 10e6/uL    Hemoglobin 16.7 13.3 - 17.7 g/dL    Hematocrit 48.0 40.0 - 53.0 %    MCV 90 78 - 100 fL    MCH 31.3 26.5 - 33.0 pg    MCHC 34.8 31.5 - 36.5 g/dL    RDW 12.7 10.0 - 15.0 %    Platelet Count 282 150 - 450 10e3/uL    % Neutrophils 41 %    % Lymphocytes 45 %    % Monocytes 8 %    % Eosinophils 5 %    % Basophils 1 %    % Immature Granulocytes 1 %    NRBCs per 100 WBC 0 <1 /100    Absolute Neutrophils 4.1 1.6 - 8.3 10e3/uL    Absolute Lymphocytes 4.4 0.8 - 5.3 10e3/uL    Absolute Monocytes 0.8 0.0 - 1.3 10e3/uL    Absolute Eosinophils 0.5 0.0 - 0.7 10e3/uL    Absolute Basophils 0.1 0.0 - 0.2 10e3/uL    Absolute Immature Granulocytes 0.1 <=0.4 10e3/uL    Absolute NRBCs 0.0 10e3/uL   Urine Drug Screen Panel     Status: Normal    Collection Time: 06/20/25  6:23 AM   Result Value Ref Range    Amphetamines Urine Screen Negative Screen Negative    Barbituates Urine Screen Negative Screen Negative    Benzodiazepine Urine Screen Negative Screen Negative    Cannabinoids Urine Screen Negative Screen Negative    Cocaine Urine Screen Negative Screen Negative    Fentanyl Qual Urine Screen  Negative Screen Negative    Opiates Urine Screen Negative Screen Negative    PCP Urine Screen Negative Screen Negative   RBC and Platelet Morphology     Status: None    Collection Time: 06/20/25  6:23 AM   Result Value Ref Range    RBC Morphology Confirmed RBC Indices     Platelet Assessment  Automated Count Confirmed. Platelet morphology is normal.     Automated Count Confirmed. Platelet morphology is normal.       ED MEDICATIONS:   Medications   OLANZapine zydis (zyPREXA) ODT tab 10 mg (10 mg Oral $Given 6/20/25 06)         Impression:    ICD-10-CM    1. Paranoid schizophrenia (H)  F20.0       2. Acute psychosis (H)  F23                 MD Jose Jimenez Jake, MD  06/20/25 8369

## 2025-06-20 NOTE — ED NOTES
"Report from Jere MA RN and care assumed. Patient up to the bathroom and now back to bed. He is aware of DEC assessment. Patient is calm and cooperative . He does report sensation of a bug in his throat and creepy crawling down his spine. \"Do you think its my liver? Do you think I'm bleeding out?\". Dr Garcia updated.   "

## 2025-06-20 NOTE — ED NOTES
IP MH Referral Acuity Rating Score (RARS)    LMHP complete at referral to IP MH, with DEC; and, daily while awaiting IP MH placement. Call score to PPS.  CRITERIA SCORING   New 73 HH and Involuntary for IP MH (not adolescent) 0/3   Boarding over 24 hours 0/1   Vulnerable adult at least 55+ with multiple co morbidities; or, Patient age 11 or under 0/1   Suicide ideation without relief of precipitating factors 0/1   Current plan for suicide 0/1   Current plan for homicide 0/1   Imminent risk or actual attempt to seriously harm another without relief of factors precipitating the attempt 0/1   Severe dysfunction in daily living (ex: complete neglect for self care, extreme disruption in vegetative function, extreme deterioration in social interactions) 1/1   Recent (last 2 weeks) or current physical aggression in the ED 0/1   Restraints or seclusion episode in ED 0/1   Verbal aggression, agitation, yelling, etc., while in the ED 0/1   Active psychosis with psychomotor agitation or catatonia 0/1   Need for constant or near constant redirection (from leaving, from others, etc).  1/1   Intrusive or disruptive behaviors 1/1   TOTAL 3

## 2025-06-20 NOTE — PLAN OF CARE
Joo Serrato  June 20, 2025  Plan of Care Hand-off Note     Patient Recommended Care Path: inpatient mental health    Clinical Substantiation:  Pt presents to ED for decompensation of mental health due to not taking medications.  He is experiencing delusions and tactile hallucinations, thinking a bug is crawling out of his throat and reporting other physical sensations like something is dripping down his back.  Pt has a history of Schizophrenia and one previous hospitalization in 2020 due to command hallucinations, instructing him to take an overdose of pills which was interrupted.  Pt is denying current auditory or visual hallucinations but has a history of experiencing.  Pt denies SI/SIB/HI.  Due to pt's decompensation and not able to adequately care for himself and significant impairment in functioning, it is recommended pt be admitted for inpatient psychiatric hospitalization.    Goals for crisis stabilization:  Stabilization of symptoms which most likely will require medication intervention.    Next steps for Care Team:  Pt has been placed on inpatient placement list.    Treatment Objectives Addressed:  assessing safety    Therapeutic Interventions:   n/a    Has a specific means been identified for suicidal.homicide actions: No      Patient coping skills attempted to reduce the crisis:  no coping skills applied.                          Collateral contact information:   Gilbert Serrato, father, [phone]      Legal Status: Voluntary/Patient has signed consent for treatment                                                                                                                                 Reviewed court records: yes     Psychiatry Consult:     Jocelyn Kehr Sparby, HARDEEPC, LADC

## 2025-06-20 NOTE — ED NOTES
"Patient is refusing to eat at this time. \"I think I have bugs that are trying to get out of my body. There are people out there that put bugs in food.\" Lunch tray removed from room.  "

## 2025-06-20 NOTE — ED PROVIDER NOTES
"  History     Chief Complaint   Patient presents with    Mental Health Problem     HPI  Joo Serrato is a 30 year old male with history of chronic paranoid schizophrenia who presents for evaluation of a bug crawling sensation.  Patient comes to the emergency department by himself this morning.  History is limited due to apparent psychosis.  He describes feeling quite anxious due to an abnormal sensation in the right chest and throat, states that he thinks bugs are \"crawling inside\" him.  Patient states that he has not been taking his usual psychiatric medications.  Additional information was provided by his father, Gilbert ([phone]).  Apparently the patient has been gradually decompensating over the last few weeks.  He has been getting very poor sleep and has been declining his nightly Seroquel/hydroxyzine.  Patient has reportedly been making unusual statements.  Father states that he had gotten benefit in the past by receiving scheduled injections, but does not seem to be doing well with current outpatient therapy regimen.  Patient denies any alcohol or substance use.  He also denies thoughts of harming himself or others, any other physical complaints today.    Per chart review, patient presented to the emergency department on 3/6/2025 for medication noncompliance and hallucinations.  He took his regular dose of Seroquel at that time.  Declined inpatient admission and DEC was able to increase resources through [facility] and [facility].    Allergies:  Allergies   Allergen Reactions    No Known Drug Allergy      Problem List:    Patient Active Problem List    Diagnosis Date Noted    Psychophysiological insomnia 05/16/2024     Priority: Medium    Chronic paranoid schizophrenia (H) 10/03/2020     Priority: Medium    Suicide attempt (H) 10/02/2020     Priority: Medium    Schizophrenia (H) 07/03/2020     Priority: Medium    Auditory hallucination 12/02/2019     Priority: Medium    Sprain of medial " "collateral ligament of knee 10/21/2013     Priority: Medium    Encopresis 01/29/2004     Priority: Medium     Problem list name updated by automated process. Provider to review      Urinary incontinence 01/29/2004     Priority: Medium     Problem list name updated by automated process. Provider to review        Past Medical History:    No past medical history on file.    Past Surgical History:    Past Surgical History:   Procedure Laterality Date    TONSILLECTOMY & ADENOIDECTOMY  02/13/2007     Family History:    No family history on file.    Social History:  Marital Status:  Single [1]  Social History     Tobacco Use    Smoking status: Every Day     Current packs/day: 1.00     Types: Cigarettes    Smokeless tobacco: Former     Types: Chew   Vaping Use    Vaping status: Former   Substance Use Topics    Alcohol use: No    Drug use: No      Medications:    hydrOXYzine omer (VISTARIL) 25 MG capsule  QUEtiapine (SEROQUEL) 200 MG tablet      Review of Systems   All other systems reviewed and are negative.      Physical Exam   BP: (!) 142/90  Pulse: 104  Resp: 24  SpO2: 98 %      Physical Exam  Vitals and nursing note reviewed.   Constitutional:       General: He is in acute distress.      Appearance: Normal appearance. He is not ill-appearing or toxic-appearing.      Comments: Patient appears disheveled and anxious.  Fidgety.  States repeatedly that he has a crawling sensation on his chest and in the throat.  Describes this as feeling like a \"bubble\" or \"bugs crawling\".   HENT:      Head: Normocephalic and atraumatic.      Nose: Nose normal.      Mouth/Throat:      Mouth: Mucous membranes are moist.      Pharynx: No oropharyngeal exudate or posterior oropharyngeal erythema.   Eyes:      General: No scleral icterus.     Extraocular Movements: Extraocular movements intact.      Conjunctiva/sclera: Conjunctivae normal.      Pupils: Pupils are equal, round, and reactive to light.   Cardiovascular:      Rate and Rhythm: Normal " rate and regular rhythm.      Pulses: Normal pulses.      Heart sounds: Normal heart sounds.   Pulmonary:      Effort: Pulmonary effort is normal. No respiratory distress.      Breath sounds: Normal breath sounds.   Abdominal:      Palpations: Abdomen is soft.      Tenderness: There is no abdominal tenderness. There is no rebound.   Musculoskeletal:         General: No deformity. Normal range of motion.      Cervical back: Normal range of motion and neck supple.   Skin:     General: Skin is warm.      Capillary Refill: Capillary refill takes less than 2 seconds.      Coloration: Skin is not pale.      Findings: No erythema or rash.   Neurological:      General: No focal deficit present.      Mental Status: He is alert and oriented to person, place, and time.      Comments: Occasional twitching movements.  Otherwise seems to be moving all extremities spontaneously with equal strength and normal coordination.   Psychiatric:      Comments: Elevated mood, hyperactive.  Appears paranoid, though denies hallucinations at this time.  Fidgety movements as above.  Tangential comments.  Denies thoughts of harming himself or others.         ED Course     ED Course as of 06/20/25 0631   Fri Jun 20, 2025   0612 Had extensive conversation with the patient's father (Gilbert: [phone]).  He lives with the patient and states that he has been escalating gradually over the last few days.  He probably has not slept in 2 days and has been refusing his nightly Seroquel.  Patient has not been getting benefit from occasional therapy sessions.  Father feels that he may need inpatient management for stabilization.  Also states that the patient has never had suicidal intent or any recent substance abuse issues.  Father states that he has an important business meeting this morning, but will then come to the emergency department around 10 to 10:30 AM.     Procedures            Results for orders placed or performed during the hospital encounter  of 06/20/25 (from the past 24 hours)   CBC with platelets differential    Narrative    The following orders were created for panel order CBC with platelets differential.  Procedure                               Abnormality         Status                     ---------                               -----------         ------                     CBC with platelets and ...[[id number]]                      In process                   Please view results for these tests on the individual orders.   Urine Drug Screen    Narrative    The following orders were created for panel order Urine Drug Screen.  Procedure                               Abnormality         Status                     ---------                               -----------         ------                     Urine Drug Screen Panel[[id number]]                         In process                   Please view results for these tests on the individual orders.       Medications   OLANZapine zydis (zyPREXA) ODT tab 10 mg (10 mg Oral $Given 6/20/25 0626)       Assessments & Plan (with Medical Decision Making)     I have reviewed the nursing notes.    I have reviewed the findings, diagnosis, plan and need for follow up with the patient.  Medical Decision Making  Joo Serrato is a 30 year old male with history of chronic paranoid schizophrenia who presents for evaluation of a bug crawling sensation.  Borderline tachycardic and hypertensive on arrival.  Patient appears disheveled, anxious, and paranoid, consistent with previous diagnosis of schizophrenia.  History from the patient is somewhat limited due to current psychosis.  However, I was able to obtain information from his father.  It sounds like he has been progressively decompensating and has not been compliant with medications for at least a few weeks.  Father states that the patient has probably not slept in days.  He feels that the patient is not doing well on current outpatient therapy schedule and  that the patient may need inpatient care.  Although the patient is denying thoughts of self-harm, I do not believe he has the ability to care for himself due to paranoia and psychosis.  For this reason we ordered a sitter and formal DEC assessment.  We gave a dose of oral Zyprexa and obtained some basic lab work here in the emergency department while awaiting this evaluation.  Results are still pending.  Patient's father is planning on coming to the emergency department after a work meeting this morning.  Patient will be signed out to the daytime physician to follow-up on DEC recommendations and ultimately disposition.    New Prescriptions    No medications on file     Final diagnoses:   Paranoid schizophrenia (H)   Acute psychosis (H)     6/20/2025   [facility] EMERGENCY DEPT       Josue Cervantes MD  06/20/25 0641

## 2025-06-20 NOTE — CONSULTS
"Diagnostic Evaluation Consultation  Crisis Assessment    Patient Name: Joo Serrato  Age:  30 year old  Legal Sex: male  Gender Identity: male  Pronouns:      Race: White  Ethnicity: Not  or   Language: English      Patient was assessed: Virtual: [employer]   Crisis Assessment Start Date: 06/20/25  Crisis Assessment Start Time: 0714  Crisis Assessment Stop Time: 0728  Patient location: [facility] Emergency Dept                             ED04    Referral Data and Chief Complaint  Joo Serrato presents to the ED with family/friends. Patient is presenting to the ED for the following concerns: Other (see comment) (delusions, tactile hallucinations). Factors that make the mental health crisis life threatening or complex are: When writer presented on iPad for interview and exchanged greeting and introduction pt stated, \"I have something dripping down my back right now\" and continued to say that he heard a pop and thinks that something might be wrong with his spinal chord.  Pt goes on to say \"When I breathe it's making a weird noise\" and that he thinks he might be dying and a bug is crawling out of his throat.  Pt denies SI/SIB/HI.  He denies experiencing auditory or visual hallucinations.  Pt presents as anxious and perseverating on a bug crawling out of his throat throughout interview, often answering questions with statements or questions regarding this.  Due to pt's inability to participate effectively, history was taken from DEC assessment completed on 3/6/2025.  Pt's father was present during ED admission and admission notes regarding father's collateral read, \"patient has been gradually decompensating over the last few weeks. He has been getting very poor sleep and has been declining his nightly Seroquel/hydroxyzine. Patient has reportedly been making unusual statements. Father states that he had gotten benefit in the past by receiving scheduled injections, but does not " "seem to be doing well with current outpatient therapy regimen\"..      Informed Consent and Assessment Methods  Explained the crisis assessment process, including applicable information disclosures and limits to confidentiality, assessed understanding of the process, and obtained consent to proceed with the assessment.  Assessment methods included conducting a formal interview with patient, review of medical records, collaboration with medical staff, and obtaining relevant collateral information from family and community providers when available.  : done     History of the Crisis   Pt has a history of Schizophrenia. He's had one previous [facility] hospitalization in 2020 after an interrupted overdose attempt of taking his mother's Buspirone due to command auditory hallucinations.  Pt presented to ED on 3/6/25 due to being without his medications for 3 weeks and decompensation.  At that time, pt was responding to internal stimuli and experiencing auditory and visual hallucinations.  Pt and family were wanting to address by scheduling outpatient medication management appointment.  Pt states he is taking his medications \"somewhat...not that often\".  Upon further questioning pt reports he hadn't taken his medications in a few weeks.  Pt denies any difficulty with sleep or appetite.  He reports he is not working and is not able to communicate what he is doing with his time at home.  Pt denies substance use.    Brief Psychosocial History  Family:  Single, Children no  Support System:  Parent(s)  Employment Status:  unemployed  Source of Income:  none  Financial Environmental Concerns:  unable to afford rent/mortgage  Current Hobbies:  exercise/fitness  Barriers in Personal Life:  mental health concerns    Significant Clinical History  Current Anxiety Symptoms:  anxious  Current Depression/Trauma:  difficulty concentrating  Current Somatic Symptoms:  anxious  Current Psychosis/Thought Disturbance:  distractability, tactile " hallucinations, inattentive  Current Eating Symptoms:   (denies)  Chemical Use History:  Alcohol: None  Benzodiazepines: None  Opiates: None  Cocaine: None  Marijuana: None  Other Use: None  Withdrawal Symptoms:  (n/a)  Addictions:  (none reported)   Past diagnosis:  Schizophrenia  Family history:  Substance Use Disorder, Bipolar Disorder  Past treatment:  Psychiatric Medication Management  Details of most recent treatment:  Psychiatry within the past 3 months, unknown last appointment  Other relevant history:  none noted    Have there been any medication changes in the past two weeks:  no       Is the patient compliant with medications:  no  pt not taking medications     Collateral Information  Is there collateral information: No (left voicemail for Gilbert, father, [phone])     Collateral information name, relationship, phone number:       What happened today:       What is different about patient's functioning:       What do you think the patient needs:      Has patient made comments about wanting to kill themselves/others:      If d/c is recommended, can they take part in safety/aftercare planning:       Additional collateral information:        Risk Assessment  [place] Suicide Severity Rating Scale Full Clinical Version:  Suicidal Ideation  Q1 Wish to be Dead (Lifetime): Yes  Q2 Non-Specific Active Suicidal Thoughts (Lifetime): Yes  3. Active Suicidal Ideation with any Methods (Not Plan) Without Intent to Act (Lifetime): Yes  4. Active Suicidal Ideation with Some Intent to Act, Without Specific Plan (Lifetime): Yes  5. Active Suicidal Ideation with Specific Plan and Intent (Lifetime): Yes  Q6 Suicide Behavior (Lifetime): yes  Intensity of Ideation (Lifetime)  Most Severe Ideation Rating (Lifetime): 5  Suicidal Behavior (Lifetime)  Actual Attempt (Lifetime): Yes  Total Number of Actual Attempts (Lifetime): 1  Actual Attempt Description (Lifetime): 2020 took an overdose of mother's Buspar following command of  auditory hallucinations  Has subject engaged in non-suicidal self-injurious behavior? (Lifetime): No  Interrupted Attempts (Lifetime): Yes  Total Number of Interrupted Attempts (Lifetime): 1  Interrupted Attempt Description (Lifetime): pt's mother interrupted taking the pills  Aborted or Self-Interrupted Attempt (Lifetime): No  Preparatory Acts or Behavior (Lifetime): No    [place] Suicide Severity Rating Scale Recent:   Suicidal Ideation (Recent)  Q1 Wished to be Dead (Past Month): no  Q2 Suicidal Thoughts (Past Month): no  Level of Risk per Screen: no risks indicated     Suicidal Behavior (Recent)  Actual Attempt (Past 3 Months): No  Has subject engaged in non-suicidal self-injurious behavior? (Past 3 Months): No  Interrupted Attempts (Past 3 Months): No  Aborted or Self-Interrupted Attempt (Past 3 Months): No  Preparatory Acts or Behavior (Past 3 Months): No    Environmental or Psychosocial Events: neither working nor attending school, unemployment/underemployment  Protective Factors: Protective Factors: strong bond to family unit, community support, or employment, lives in a responsibly safe and stable environment, able to access care without barriers    Does the patient have thoughts of harming others? Feels Like Hurting Others: no  Previous Attempt to Hurt Others: no  Current presentation:  (pt presents as anxious, cooperative)  Is the patient engaging in sexually inappropriate behavior?: no  Does Patient have a known history of aggressive behavior: No  Has aggression occurred as a result of MH concerns/diagnosis: no  Does patient have history of aggression in hospital: no    Is the patient engaging in sexually inappropriate behavior?  no        Mental Status Exam   Affect: Constricted  Appearance: Disheveled  Attention Span/Concentration: Inattentive  Eye Contact: Variable    Fund of Knowledge: Delayed   Language /Speech Content: Fluent  Language /Speech Volume: Normal  Language /Speech Rate/Productions:  Normal  Recent Memory: Variable  Remote Memory: Variable  Mood: Anxious  Orientation to Person: Yes   Orientation to Place: Yes  Orientation to Time of Day: Yes  Orientation to Date: No     Situation (Do they understand why they are here?): Yes  Psychomotor Behavior: Normal  Thought Content: Delusions  Thought Form: Tangential       Medication  Psychotropic medications:   Medication Orders - Psychiatric (From admission, onward)      Start     Dose/Rate Route Frequency Ordered Stop    06/20/25 0610  OLANZapine zydis (zyPREXA) ODT tab 10 mg         10 mg Oral AT BEDTIME 06/20/25 0604               Current Care Team  Patient Care Team:  No Ref-Primary, Physician as PCP - Stone Perea MD as Assigned PCP    Diagnosis  Patient Active Problem List   Diagnosis Code    Encopresis R15.9    Urinary incontinence R32    Sprain of medial collateral ligament of knee S83.419A    Auditory hallucination R44.0    Schizophrenia (H) F20.9    Suicide attempt (H) T14.91XA    Chronic paranoid schizophrenia (H) F20.0    Psychophysiological insomnia F51.04       Primary Problem This Admission  Active Hospital Problems    *Schizophrenia (H), F20.9        Clinical Summary and Substantiation of Recommendations   Clinical Substantiation:  Pt presents to ED for decompensation of mental health due to not taking medications.  He is experiencing delusions and tactile hallucinations, thinking a bug is crawling out of his throat and reporting other physical sensations like something is dripping down his back.  Pt has a history of Schizophrenia and one previous hospitalization in 2020 due to command hallucinations, instructing him to take an overdose of pills which was interrupted.  Pt is denying current auditory or visual hallucinations but has a history of experiencing.  Pt denies SI/SIB/HI.  Due to pt's decompensation and not able to adequately care for himself and significant impairment in functioning, it is recommended pt be  admitted for inpatient psychiatric hospitalization.    Goals for crisis stabilization:  Stabilization of symptoms which most likely will require medication intervention.    Next steps for Care Team:  Pt has been placed on inpatient placement list.    Treatment Objectives Addressed:  assessing safety    Therapeutic Interventions:   n/a    Has a specific means been identified for suicidal/homicide actions: No      Patient coping skills attempted to reduce the crisis:  no coping skills applied.    Disposition  Recommended referrals: Medication Management        Reviewed case and recommendations with attending provider. Attending Name: Dr. Garcia       Attending concurs with disposition: yes       Patient and/or validated legal guardian concurs with disposition:   yes       Final disposition:  inpatient mental health                            Legal status: Voluntary/Patient has signed consent for treatment                                                                                                                                 Reviewed court records: yes       Assessment Details   Total duration spent with the patient: 14 min     CPT code(s) utilized: Non-Billable    Jocelyn Kehr Sparby, LPCC, NICO, Psychotherapist  DEC - Triage & Transition Services  Callback: [phone]

## 2025-06-21 ENCOUNTER — HOSPITAL ENCOUNTER (INPATIENT)
Facility: CLINIC | Age: 30
End: 2025-06-21
Attending: PSYCHIATRY & NEUROLOGY | Admitting: PSYCHIATRY & NEUROLOGY
Payer: COMMERCIAL

## 2025-06-21 VITALS
DIASTOLIC BLOOD PRESSURE: 77 MMHG | SYSTOLIC BLOOD PRESSURE: 121 MMHG | HEART RATE: 81 BPM | TEMPERATURE: 97.9 F | RESPIRATION RATE: 18 BRPM | OXYGEN SATURATION: 94 %

## 2025-06-21 DIAGNOSIS — F17.210 CIGARETTE NICOTINE DEPENDENCE WITHOUT COMPLICATION: ICD-10-CM

## 2025-06-21 DIAGNOSIS — F20.0 CHRONIC PARANOID SCHIZOPHRENIA (H): ICD-10-CM

## 2025-06-21 DIAGNOSIS — F20.0 PARANOID SCHIZOPHRENIA (H): Primary | ICD-10-CM

## 2025-06-21 PROBLEM — R45.86 MOOD CHANGES: Status: ACTIVE | Noted: 2025-06-21

## 2025-06-21 PROBLEM — F22 DELUSIONAL THOUGHTS (H): Status: ACTIVE | Noted: 2025-06-21

## 2025-06-21 PROCEDURE — 124N000002 HC R&B MH UMMC

## 2025-06-21 PROCEDURE — 250N000013 HC RX MED GY IP 250 OP 250 PS 637: Performed by: PSYCHIATRY & NEUROLOGY

## 2025-06-21 RX ORDER — HYDROXYZINE HYDROCHLORIDE 25 MG/1
25 TABLET, FILM COATED ORAL EVERY 4 HOURS PRN
Status: DISPENSED | OUTPATIENT
Start: 2025-06-21

## 2025-06-21 RX ORDER — LORAZEPAM 1 MG/1
2 TABLET ORAL EVERY 8 HOURS PRN
Status: DISCONTINUED | OUTPATIENT
Start: 2025-06-21 | End: 2025-06-21 | Stop reason: HOSPADM

## 2025-06-21 RX ORDER — OLANZAPINE 5 MG/1
10 TABLET, ORALLY DISINTEGRATING ORAL 3 TIMES DAILY PRN
Status: DISCONTINUED | OUTPATIENT
Start: 2025-06-21 | End: 2025-06-21 | Stop reason: HOSPADM

## 2025-06-21 RX ORDER — POLYETHYLENE GLYCOL 3350 17 G/17G
17 POWDER, FOR SOLUTION ORAL DAILY PRN
Status: ACTIVE | OUTPATIENT
Start: 2025-06-21

## 2025-06-21 RX ORDER — HYDROXYZINE PAMOATE 25 MG/1
25 CAPSULE ORAL 3 TIMES DAILY PRN
Status: DISCONTINUED | OUTPATIENT
Start: 2025-06-21 | End: 2025-06-21

## 2025-06-21 RX ORDER — OLANZAPINE 10 MG/1
10 TABLET, FILM COATED ORAL 3 TIMES DAILY PRN
Status: DISPENSED | OUTPATIENT
Start: 2025-06-21

## 2025-06-21 RX ORDER — OLANZAPINE 10 MG/2ML
10 INJECTION, POWDER, FOR SOLUTION INTRAMUSCULAR 3 TIMES DAILY PRN
Status: ACTIVE | OUTPATIENT
Start: 2025-06-21

## 2025-06-21 RX ORDER — ACETAMINOPHEN 325 MG/1
650 TABLET ORAL EVERY 4 HOURS PRN
Status: ACTIVE | OUTPATIENT
Start: 2025-06-21

## 2025-06-21 RX ORDER — MAGNESIUM HYDROXIDE/ALUMINUM HYDROXICE/SIMETHICONE 120; 1200; 1200 MG/30ML; MG/30ML; MG/30ML
30 SUSPENSION ORAL EVERY 4 HOURS PRN
Status: ACTIVE | OUTPATIENT
Start: 2025-06-21

## 2025-06-21 RX ORDER — TRAZODONE HYDROCHLORIDE 50 MG/1
50 TABLET ORAL
Status: DISPENSED | OUTPATIENT
Start: 2025-06-21

## 2025-06-21 RX ADMIN — NICOTINE POLACRILEX 4 MG: 2 GUM, CHEWING BUCCAL at 16:31

## 2025-06-21 RX ADMIN — HYDROXYZINE HYDROCHLORIDE 25 MG: 25 TABLET, FILM COATED ORAL at 18:11

## 2025-06-21 RX ADMIN — TRAZODONE HYDROCHLORIDE 50 MG: 50 TABLET ORAL at 19:54

## 2025-06-21 RX ADMIN — NICOTINE POLACRILEX 4 MG: 2 GUM, CHEWING BUCCAL at 18:11

## 2025-06-21 ASSESSMENT — ACTIVITIES OF DAILY LIVING (ADL)
ADLS_ACUITY_SCORE: 41
HYGIENE/GROOMING: INDEPENDENT
ADLS_ACUITY_SCORE: 41
ADLS_ACUITY_SCORE: 15
ADLS_ACUITY_SCORE: 41
ADLS_ACUITY_SCORE: 15
ADLS_ACUITY_SCORE: 41
ADLS_ACUITY_SCORE: 15
ADLS_ACUITY_SCORE: 15
ADLS_ACUITY_SCORE: 41
ADLS_ACUITY_SCORE: 41
ADLS_ACUITY_SCORE: 15
ADLS_ACUITY_SCORE: 41
ADLS_ACUITY_SCORE: 15
ADLS_ACUITY_SCORE: 15
ADLS_ACUITY_SCORE: 41
ADLS_ACUITY_SCORE: 41
ADLS_ACUITY_SCORE: 15
ADLS_ACUITY_SCORE: 41
ADLS_ACUITY_SCORE: 15

## 2025-06-21 ASSESSMENT — LIFESTYLE VARIABLES: SKIP TO QUESTIONS 9-10: 0

## 2025-06-21 NOTE — ED PROVIDER NOTES
"ED Signout    Joo Serrato is a 30 year old male who was signed out to me at the change of shift for mental health inpatient hospitalization.  At the time of the signout, I was informed the patient was accepted at [facility].  Transfer was supposed to take place later tonight.    9:15 PM: I was asked to see the patient as he no longer wants to be transferred for inpatient mental health.  He denies any suicidal or homicidal ideation or plans.  He states that he has a tooth that was implanted that is serving as a speaker, and he wanted to make sure he did not have any \"bugs\" implanted in him when he came.  This has been ongoing and it has been a long story.  Patient's previous DEC notes were reviewed.  This included corroborating information from the patient's father who apparently stated the patient has been gradually decompensating over the last few weeks.  Patient is requesting discharge home.    I have asked diagnostic evaluation center to reassess the patient for possible discharge and safety planning.  Please see complete note.  The following is a summary of the plan:    Plan: Final Disposition / Recommended Care Path: inpatient mental health  Plan for Care reviewed with assigned Medical Provider: yes  Plan for Care Team Review: provider  Comments: negative for all substances  Patient and/or validated legal guardian concurs: no  Clinical Substantiation: Pt presents to ED for decompensation of mental health due to not taking medications.  He is experiencing delusions and tactile hallucinations, thinking a bug is crawling out of his throat and reporting other physical sensations like something is dripping down his back.  Pt has a history of Schizophrenia and one previous hospitalization in 2020 due to command hallucinations, instructing him to take an overdose of pills which was interrupted.  Pt is denying current auditory or visual hallucinations but has a history of experiencing.  Pt denies " SI/SIB/HI.  Due to pt's decompensation and not able to adequately care for himself and significant impairment in functioning, it is recommended pt be admitted for inpatient psychiatric hospitalization.  Update 6/20/25 11:15 pm:  Pt is inattentive, distracted with impaired judgment and insight.  Pt endorsing auditory and visual hallucinations, expressing paranoid and potentially unsafe thoughts (law enforcement following, listening to me) communicating through transistor in tooth.  Pt expressing feeling unsafe.  Symptoms are impacting pt's ability to conduct daily activities.  Pt is withdrawn and functioning below baseline.     Legal Status: Legal Status: 72 Hour Hold  72 Hour Hold - Date/Time Initiated: 6/20/25        Final diagnoses:   Paranoid schizophrenia (H)   Acute psychosis (H)         MDM:  Joo Serrato is a 30 year old male with history of paranoid schizophrenia with delusions with steady decline over few weeks who was signed out to me at the change of shift with plans for inpatient hospitalization at [facility].  Patient had initial behavioral health assessment with corroborating information from the patient's parents as well as ED physician attending.    With further delay in transfer to mental health hospital, patient was demanding change in disposition.  He wanted to be discharged home.  I explained to mom that I was not involved in the initial decisions, but would consider reassessment by the DEC.  Patient at 1 point left the room, walked out of the side exit but was mad at the front door of the hospital and was accompanied back to his room.  We asked security to come and make their presence known.  I went into discussed with the patient the reasons why he needed to have a reassessment.    10:40 PM: Patient had a second behavioral health assessment.  Based on information from the patient's parents, and the patient himself, patient warrants hospitalization.  Please see the updated  "behavioral health notes.  I went in to speak with the patient and he was very adamant that he did not need hospitalization.  Patient will need to be placed on a 72-hour hold.  Patient continued to deny that his symptoms are related to the schizophrenia.  He is insistent that there is a transition to her radio and one of his teeth, broadcasting to speakers externally, and that he was concerned that he was being \"bugged.\"    Mental health boarding orders have been written.  Continue to wait for inpatient mental health bed availability.            Disclaimer: This note consists of words and symbols derived from keyboarding and dictation using voice recognition software.  As a result, there may be errors that have gone undetected.  Please consider this when interpreting information found in this note.       Kamini Morales MD  06/22/25 0715    "

## 2025-06-21 NOTE — ED NOTES
PT attempted to walk out of department- pt came back in cooperatively with charge nurse. PT understands that he needs to wait for the DEC assessment to get a safety plan before being able to leave. Pt in Room now and agreeable

## 2025-06-21 NOTE — PLAN OF CARE
Problem: Psychotic Signs/Symptoms  Goal: Optimal Cognitive Function (Psychotic Signs/Symptoms)  Outcome: Not Progressing   Goal Outcome Evaluation:       Patient admitted to station 12 from [facility] ED. He was compliant with the search and unit orientation was given to him. He is on a 72 hour hold. He appears confused about the conditions of the hold and does not understand that the weekends don't count due to court closure. He appears to be distracted as if he is responding to internal stimuli. He states that sometimes he hears voices in the form of a speaker and they follow him around. He is currently unemployed and lives with his dad. He us unkempt. He is polite and pleasant on approach. He denies SI/SIB/depressive symptoms. He is able to contract for safety. He denies any substance use.

## 2025-06-21 NOTE — ED NOTES
Called [facility] - told the unit is unable to take patient tonight. Charge and ANS aware, MD notified.

## 2025-06-21 NOTE — PLAN OF CARE
" INITIAL PSYCHOSOCIAL ASSESSMENT AND NOTE    Information for assessment was obtained from:       [x]Patient     []Parent     []Community provider    [x]Hospital records   []Other     []Guardian       Presenting Problem:  Patient is a 30 year old male who uses he/him. Patient was admitted to [facility] on 6/21/2025 Station 12N on a 72 hour hold placed on 06/20/25  at 10:52 PM .    Presenting issues and presentation for admit:   Pre DEC assessment completed on 06/20/2025:  \"When writer presented on iPad for interview and exchanged greeting and introduction pt stated, \"I have something dripping down my back right now\" and continued to say that he heard a pop and thinks that something might be wrong with his spinal chord.  Pt goes on to say \"When I breathe it's making a weird noise\" and that he thinks he might be dying and a bug is crawling out of his throat.  Pt denies SI/SIB/HI.  He denies experiencing auditory or visual hallucinations.  Pt presents as anxious and perseverating on a bug crawling out of his throat throughout interview, often answering questions with statements or questions regarding this.  Due to pt's inability to participate effectively, history was taken from DEC assessment completed on 3/6/2025.  Pt's father was present during ED admission and admission notes regarding father's collateral read, \"patient has been gradually decompensating over the last few weeks. He has been getting very poor sleep and has been declining his nightly Seroquel/hydroxyzine. Patient has reportedly been making unusual statements. Father states that he had gotten benefit in the past by receiving scheduled injections, but does not seem to be doing well with current outpatient therapy regimen\".    Writer met with Joo in his room, where he was observed lying in bed and watching television. During the assessment, Joo reported experiencing ongoing concerns related to being " "forcibly administered substances. He stated that individuals have been injecting him with drugs against his will and alleged that his beer and food had been tampered with, resulting in involuntary \"boners\". 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 Joo also reported that people mess with him by placing speakers everywhere.     Joo expressed that he does not believe he requires psychiatric hospitalization or pharmacological intervention at this time. He requested assistance from the writer in obtaining information about his criminal history and offered monetary compensation for this service. The writer informed him that they were unable to assist with this request. Additionally, Joo inquired about the implications of a 72-hour psychiatric hold (72-Hour Hold) on his ability to legally purchase a firearm.    The following areas have been assessed:    History of Mental Health and Chemical Dependency:  Mental Health History:  Patient has a historical diagnosis of Unspecified psychosis and schizophrenia.   The patient has a history of suicide attempts via intentional overdose on prescribed medications in the context of command hallucinations.   Patient  denies a history of engaged in non-suicidal self-injury .     Previous psychiatric hospitalizations and treatments (including outpatient, residential, and inpatient care:  Joo does have a history of psychiatric hospitalization. He last hospitalization was 10/2/2020 to 10/5/2020 at [facility] on [address] due to overdose in the context of psychosis. He has previously engaged in individual therapy and medication management. Joo was unaware of any other programming he has participated in.     Substance Use History  Utox was negative.       Patient's current relationship status is   single.   Patient reported having zero child(leia).       Family Description (Constellation, significant " information and events, Family Psychiatric History):   Pre chart review, Joo grew up in [place], [place]. He has 2 brother and 2 sisters. Pre chart review, Joo's sister and mother both have been diagnosed with bipolar disorder and there is a maternal cousin with schizophrenia. His father and mother have a history of methamphetamine addiction.         Significant Medical issues, Life events or Trauma history:   Pre chart review, Joo witnessed his mother attempt to complete suicide multiple times. 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 Joo's father also spent five years in snf.     Pre chart review, Joo has a history of TBI in high school while playing football.       Living Situation:  Patient's current living/housing situation is staying with parents. They housing is stable and they are able to return upon discharge.       Educational Background:    Patient's highest education level was some college. Patient reports they are  able to understand written materials.     Occupational and Financial Status:     Patient is currently unemployed.  Patient reports  income is obtained through parents.  Patient does identify finances as a current stressor. They are insured under [employer] .     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       Legal Status:  on a 72 hour hold placed on 06/20/25  at 10:52 PM  expires 6/25/2025  [facility]:  [facility]   File Number: TBD  Start and expiration date of commitment: TBD    Commitment History:   There is no history of commitment        Service History:   None    Ethnic/Cultural/Spiritual considerations:   The patient describes their cultural background as White/, heterosexual, cisgender and male.  Contextual  "influences on patient's health include severity of symptoms and medication adherence concerns.   Patient identified their preferred language to be English. Patient reported they do not need the assistance of an .      Social Functioning (organizations, interests, support system):   In their free time, patient reports they like to watching sports and drinking beer.      Patient identified no one as part of their support system.  .       Current Treatment Providers are:  Unable to provided information on current providers. He reported \"I don't need people drugging\" when asked about medication management.     Other contact information (family, friends, SO) and MAURICE status:   Gilbert, father, [phone] (No MAURICE)      GOALS FOR HOSPITALIZATION:  What do patient want to accomplish during this hospitalization to make things better for the patient.?   Patient priorities:   They identified discharge as a goal of this hospitalization.    Social Service Assessment/Plan:        Patient will have psychiatric assessment and medication management by the psychiatrist. Medications will be reviewed and adjusted per DO/MD/APRN CNP as indicated. The treatment team will continue to assess and stabilize the patient's mental health symptoms with the use of medications and therapeutic programming. Hospital staff will provide a safe environment and a therapeutic milieu. Staff will continue to assess patient as needed. Patient will participate in unit groups and activities. Patient will receive individual and group support on the unit.      CTC will do individual inpatient treatment planning and after care planning. CTC will discuss options for increasing community supports with the patient. CTC will coordinate with outpatient providers and will place referrals to ensure appropriate follow up care is in place.         "

## 2025-06-21 NOTE — CONSULTS
DEC Consult Order placed. DEC assessment completed by Jocelyn Kehr Sparby on 6/20/25 at 7:00am. Consult acknowledged and completed. Also spoke with ED staff who relayed that reassessment is not needed at this time.    Cayla Mcbride    [facility]  [phone]

## 2025-06-21 NOTE — PLAN OF CARE
Joo Serrato  June 20, 2025  Plan of Care Hand-off Note     Patient Recommended Care Path: inpatient mental health    Clinical Substantiation:  Pt presents to ED for decompensation of mental health due to not taking medications.  He is experiencing delusions and tactile hallucinations, thinking a bug is crawling out of his throat and reporting other physical sensations like something is dripping down his back.  Pt has a history of Schizophrenia and one previous hospitalization in 2020 due to command hallucinations, instructing him to take an overdose of pills which was interrupted.  Pt is denying current auditory or visual hallucinations but has a history of experiencing.  Pt denies SI/SIB/HI.  Due to pt's decompensation and not able to adequately care for himself and significant impairment in functioning, it is recommended pt be admitted for inpatient psychiatric hospitalization.  Update 6/20/25 11:15 pm:  Pt is inattentive, distracted with impaired judgment and insight.  Pt endorsing auditory and visual hallucinations, expressing paranoid and potentially unsafe thoughts (law enforcement following, listening to me) communicating through transistor in tooth.  Pt expressing feeling unsafe.  Symptoms are impacting pt's ability to conduct daily activities.  Pt is withdrawn and functioning below baseline.  Intake has been contacted regarding the start of pt's 72 hour hold.     Goals for crisis stabilization:  Stabilization of symptoms which most likely will require medication intervention.    Next steps for Care Team:  Pt has been placed on inpatient placement list.    Treatment Objectives Addressed:  assessing safety    Therapeutic Interventions:       Has a specific means been identified for suicidal.homicide actions: No  If yes, describe:    Explain action steps toward mitigation:    Document completion of mitigation action:    The follow up action still needed prior to discharge:      Patient coping skills  attempted to reduce the crisis:  no coping skills applied.          Severe psychiatric, behavioral or other comorbid conditions are appropriate for management at inpatient mental health as indicated by at least one of the following: Impaired impulse control, judgement, or insight, Psychiatric Symptoms  Severe dysfunction in daily living is present as indicated by at least one of the following: Extreme deterioration in social interactions, Complete withdrawal from all social interactions, Complete inability to maintain any appropriate aspect of personal responsibility in any adult roles  Situation and expectations are appropriate for inpatient care: Patient is unwilling to participate in treatment voluntarily and requires treatment, Around-the-clock medical and nursing care to address symptoms and initiate intervention is required  Inpatient mental health services are necessary to meet patient needs and at least one of the following: Specific condition related to admission diagnosis is present and judged likely to further improve at proposed level of care      Collateral contact information:  Gilbert Serrato [phone]  -    Legal Status: 72 Hour Hold                         72 Hour Hold - Date/Time Initiated: 6/20/25                                                                                                       Reviewed court records: yes     Psychiatry Consult:     Chantelle Li

## 2025-06-21 NOTE — CARE PLAN
Rehab Group    Start time: 1545  End time: 1550  Patient time total: 2 minutes    attended partial group    #2 attended   Group Type: occupational therapy   Group Topic Covered: healthy leisure time, physical exercise       Group Session Detail:  Stretching       Patient Response/Contribution:  observed       Patient Detail:  While therapist was explaining the group to pt's, nursing staff pulled therapist aside aside and requested that group be held at this time for safety reasons.  Per request, group was ended.          No Charge      Patient Active Problem List   Diagnosis    Encopresis    Urinary incontinence    Sprain of medial collateral ligament of knee    Auditory hallucination    Schizophrenia (H)    Suicide attempt (H)    Chronic paranoid schizophrenia (H)    Psychophysiological insomnia    Delusional thoughts (H)    Mood changes

## 2025-06-21 NOTE — ED NOTES
PT is restless and frustrated with hold order but redirectable. Security present at nurses station. Given remote for TV for distraction.

## 2025-06-21 NOTE — ED PROVIDER NOTES
Emergency Department Patient Sign-out       Brief HPI:    Patient was signed out to me by the previous ER physician.  Current plan is inpatient mental health.  Location was established and the patient was transported without any acute events.      Exam:   Patient Vitals for the past 24 hrs:   BP Temp Temp src Pulse Resp SpO2   06/21/25 1019 121/77 97.9  F (36.6  C) Oral 81 18 94 %   06/20/25 1846 128/75 -- -- 72 20 98 %           ED RESULTS:   Results for orders placed or performed during the hospital encounter of 06/20/25 (from the past 24 hours)   Diagnostic Evaluation Center (DEC) Assessment Consult Order:     Status: None ()    Collection Time: 06/20/25  9:10 PM    Cayla Carrera     6/21/2025  9:36 AM  DEC Consult Order placed. DEC assessment completed by Jocelyn Kehr Sparby on 6/20/25 at 7:00am. Consult acknowledged and   completed. Also spoke with ED staff who relayed that reassessment   is not needed at this time.    Cayla Mcbride    [facility]  [phone]        ED MEDICATIONS:   Medications   OLANZapine zydis (zyPREXA) ODT tab 10 mg (10 mg Oral $Given 6/20/25 2057)   nicotine (NICORETTE) gum 2 mg (0 mg Buccal Hold 6/20/25 2236)   OLANZapine zydis (zyPREXA) ODT tab 10 mg (has no administration in time range)   LORazepam (ATIVAN) tablet 2 mg (has no administration in time range)   OLANZapine (zyPREXA) tablet 5 mg (5 mg Oral $Given 6/20/25 2310)         Impression:    ICD-10-CM    1. Paranoid schizophrenia (H)  F20.0       2. Acute psychosis (H)  F23                 MD Jose Jimenez Jake, MD  06/21/25 1145

## 2025-06-21 NOTE — PROGRESS NOTES
"Triage and Transition Services Extended Care Reassessment     Patient: Joo goes by \"Joo,\" uses he/him pronouns  Date of Service: June 20, 2025  Site of Service: [facility] Emergency Dept                             ED04  Patient was seen yes  Mode of Assessment: Virtual: AmWell     Reason for Reassessment: other (see comment) (MD requesting assessment for possible discharge as pt no longer agreeable to [facility])    History of Patient's Original Emergency Room Encounter: Pt has a history of Schizophrenia. He's had one previous [facility] hospitalization in 2020 after an interrupted overdose attempt of taking his mother's Buspirone due to command auditory hallucinations.  Pt presented to ED on 3/6/25 due to being without his medications for 3 weeks and decompensation.  At that time, pt was responding to internal stimuli and experiencing auditory and visual hallucinations.  Pt and family were wanting to address by scheduling outpatient medication management appointment.  Pt states he is taking his medications \"somewhat...not that often\".  Upon further questioning pt reports he hadn't taken his medications in a few weeks.  Pt denies any difficulty with sleep or appetite.  He reports he is not working and is not able to communicate what he is doing with his time at home.  Pt denies substance use.    Current Patient Presentation: Pt is distracted, inattentive with rapid speech that is difficult to understand or follow.  Thought process is disorganized, content tangential.  Pt reporting that he is being harrassed by law enforcement, drugged, poisoned, followed and stalked.  He reports he has a tooth that is acting as some kind of transmitter that he receives information from and can communicate through.  He is experiencing tactile hallucinations and delusions that bugs are in his body and moving around.  Pt reports voices are telling him to do things but won't elaborate on what those things are.  He " "denies feeling suicidal at this time but admits previous overdose was due to law enforcement voices telling him what to do and \"freaking out\".  Pt admitted it's possible he may reach that level of distress again.  Pt denies feeling safe anywhere, noting he has been killed, shot and run over.  He feels his food is poisoned.  Family reports pt is not sleeping and is up pacing and smoking.  Pt reports his sleep is \"fine\".  Pt currently feeling that there are people around his room listening and watching him, that imposters are posing as doctors and he is unable to relax.    Presentation Summary: Pt presents to interview inattentive, disorganized and appears to be responding to visual and auditory hallucinations.  Pt's functioning appears below his baseline for past couple weeks and continues to decompensate (per father). Pt hardly engaging with family at all, unable to perform daily tasks, mind is completely preoccupied with voices and hallucinations. Father expresses concern pt will harm self or others intentionally or unintentionally due to impaired concentration. He is increasingly agitated - mumbles something then quickly leaves home to smoke like he just heard something maddening. Recently found him watching cigarette burn on ground, seemingly unaware of his surroundings.  Due to severity level of pt's symptoms causing him unable to complete daily acts of living, functioning below baseline, impaired judgment and insight, emotional laibility and paranoia, pt is recommended for inpatient hospitalization.    Changes Observed Since Initial Assessment: provider request    Therapeutic Interventions Provided: Explored barriers to feeling safe, seeking help.    Current Symptoms: excessive worry, racing thoughts, anxious avoidance, difficulty concentrating, withdrawl/isolation, impaired decision making, irritable anxious, excessive worry inattentive, agitation, distractability, auditory hallucinations, visual " hallucinations, tactile hallucinations      Mental Status Exam   Affect: Constricted  Appearance: Disheveled  Attention Span/Concentration: Inattentive  Eye Contact: Variable    Fund of Knowledge: Delayed   Language /Speech Content: Fluent  Language /Speech Volume: Normal  Language /Speech Rate/Productions: Pressured  Recent Memory: Variable  Remote Memory: Variable  Mood: Irritable, Anxious  Orientation to Person: Yes   Orientation to Place: Yes  Orientation to Time of Day: Yes  Orientation to Date: No     Situation (Do they understand why they are here?): Yes  Psychomotor Behavior: Normal  Thought Content: Hallucinations, Delusions, Paranoia  Thought Form: Tangential, Paranoia    Treatment Objective(s) Addressed: assessing safety    Patient Response to Interventions: no evidence of understanding    Progress Towards Goals:  Patient Reports Symptoms Are: ongoing  Next Step to Work Toward Discharge: symptom stabilization  Symptom Stabilization Comment: pt placed on IP wait list    Case Management: Case Management Included: collaborating with patient's support system  Details on Collaborating with Patient's Support System: Conversation with pt's dad  Summary of Interaction: Decompensating for past couple weeks, hardly able to engage with family, unable to perform daily tasks, showing agitation, family concerned pt may unintentionally or intentionally harm self or others due to his impaired judgement and insight. Father expresses pt needing IP for medication and symptom stabilization.    C-SSRS Since Last Contact:   1. Wish to be Dead (Since Last Contact): No  2. Non-Specific Active Suicidal Thoughts (Since Last Contact): No     Actual Attempt (Since Last Contact): No  Has subject engaged in non-suicidal self-injurious behavior? (Since Last Contact): No  Interrupted Attempts (Since Last Contact): No  Aborted or Self-Interrupted Attempt (Since Last Contact): No  Preparatory Acts or Behavior (Since Last Contact):  No  Suicide (Since Last Contact): No  Most Lethal Attempt Date: 01/01/20  Actual Lethality/Medical Damage Code (Most Lethal Attempt):  (unknown)  Calculated C-SSRS Risk Score (Since Last Contact): No Risk Indicated    Plan: Final Disposition / Recommended Care Path: inpatient mental health  Plan for Care reviewed with assigned Medical Provider: yes  Plan for Care Team Review: provider  Comments: negative for all substances  Patient and/or validated legal guardian concurs: no  Clinical Substantiation: Pt presents to ED for decompensation of mental health due to not taking medications.  He is experiencing delusions and tactile hallucinations, thinking a bug is crawling out of his throat and reporting other physical sensations like something is dripping down his back.  Pt has a history of Schizophrenia and one previous hospitalization in 2020 due to command hallucinations, instructing him to take an overdose of pills which was interrupted.  Pt is denying current auditory or visual hallucinations but has a history of experiencing.  Pt denies SI/SIB/HI.  Due to pt's decompensation and not able to adequately care for himself and significant impairment in functioning, it is recommended pt be admitted for inpatient psychiatric hospitalization.  Update 6/20/25 11:15 pm:  Pt is inattentive, distracted with impaired judgment and insight.  Pt endorsing auditory and visual hallucinations, expressing paranoid and potentially unsafe thoughts (law enforcement following, listening to me) communicating through transistor in tooth.  Pt expressing feeling unsafe.  Symptoms are impacting pt's ability to conduct daily activities.  Pt is withdrawn and functioning below baseline.    Legal Status: Legal Status: 72 Hour Hold  72 Hour Hold - Date/Time Initiated: 6/20/25    Session Status: Time session started: 2144  Time session ended: 2213  Session Duration (minutes): 28 minutes    Session Start Time: 2144  Session Stop Time: 2213  CPT  codes: 21267 - Psychotherapy (with patient) - 30 (16-37*) min  Time Spent: 28 minutes      CPT code(s) utilized: 39249 - Psychotherapy (with patient) - 30 (16-37*) min    Diagnosis:   Patient Active Problem List   Diagnosis Code    Encopresis R15.9    Urinary incontinence R32    Sprain of medial collateral ligament of knee S83.419A    Auditory hallucination R44.0    Schizophrenia (H) F20.9    Suicide attempt (H) T14.91XA    Chronic paranoid schizophrenia (H) F20.0    Psychophysiological insomnia F51.04       Primary Problem This Admission: Active Hospital Problems    *Schizophrenia (H)        Chantelle Li   Licensed Mental Health Professional (LMHP), [facility]  [phone]

## 2025-06-22 LAB
CHOLEST SERPL-MCNC: 177 MG/DL
EST. AVERAGE GLUCOSE BLD GHB EST-MCNC: 111 MG/DL
HBA1C MFR BLD: 5.5 %
HDLC SERPL-MCNC: 27 MG/DL
LDLC SERPL CALC-MCNC: 106 MG/DL
NONHDLC SERPL-MCNC: 150 MG/DL
TRIGL SERPL-MCNC: 222 MG/DL

## 2025-06-22 PROCEDURE — 82465 ASSAY BLD/SERUM CHOLESTEROL: CPT | Performed by: PSYCHIATRY & NEUROLOGY

## 2025-06-22 PROCEDURE — 124N000002 HC R&B MH UMMC

## 2025-06-22 PROCEDURE — 99221 1ST HOSP IP/OBS SF/LOW 40: CPT | Performed by: ANESTHESIOLOGY

## 2025-06-22 PROCEDURE — 83036 HEMOGLOBIN GLYCOSYLATED A1C: CPT | Performed by: PSYCHIATRY & NEUROLOGY

## 2025-06-22 PROCEDURE — 36415 COLL VENOUS BLD VENIPUNCTURE: CPT | Performed by: PSYCHIATRY & NEUROLOGY

## 2025-06-22 PROCEDURE — 250N000013 HC RX MED GY IP 250 OP 250 PS 637: Performed by: PSYCHIATRY & NEUROLOGY

## 2025-06-22 PROCEDURE — 250N000013 HC RX MED GY IP 250 OP 250 PS 637: Performed by: ANESTHESIOLOGY

## 2025-06-22 RX ORDER — PALIPERIDONE 3 MG/1
3 TABLET, EXTENDED RELEASE ORAL DAILY
Status: DISPENSED | OUTPATIENT
Start: 2025-06-22

## 2025-06-22 RX ADMIN — PALIPERIDONE 3 MG: 3 TABLET, EXTENDED RELEASE ORAL at 16:19

## 2025-06-22 RX ADMIN — NICOTINE POLACRILEX 4 MG: 2 GUM, CHEWING BUCCAL at 14:40

## 2025-06-22 RX ADMIN — NICOTINE POLACRILEX 2 MG: 2 GUM, CHEWING BUCCAL at 18:02

## 2025-06-22 RX ADMIN — NICOTINE POLACRILEX 2 MG: 2 GUM, CHEWING BUCCAL at 19:07

## 2025-06-22 RX ADMIN — TRAZODONE HYDROCHLORIDE 50 MG: 50 TABLET ORAL at 23:26

## 2025-06-22 RX ADMIN — TRAZODONE HYDROCHLORIDE 50 MG: 50 TABLET ORAL at 19:14

## 2025-06-22 RX ADMIN — NICOTINE POLACRILEX 4 MG: 2 GUM, CHEWING BUCCAL at 11:18

## 2025-06-22 RX ADMIN — NICOTINE POLACRILEX 2 MG: 2 GUM, CHEWING BUCCAL at 21:39

## 2025-06-22 RX ADMIN — NICOTINE POLACRILEX 4 MG: 2 GUM, CHEWING BUCCAL at 09:28

## 2025-06-22 ASSESSMENT — ACTIVITIES OF DAILY LIVING (ADL)
DRESS: SCRUBS (BEHAVIORAL HEALTH);INDEPENDENT
ADLS_ACUITY_SCORE: 15
LAUNDRY: UNABLE TO COMPLETE
ADLS_ACUITY_SCORE: 15
HYGIENE/GROOMING: SHOWER;INDEPENDENT
ADLS_ACUITY_SCORE: 15

## 2025-06-22 NOTE — PROGRESS NOTES
06/21/25 2346   Valuables   Patient Belongings locker   Patient Belongings Put in Hospital Secure Location (Security or Locker, etc.) clothing   Did you bring any home meds/supplements to the hospital?  No     Locker:   Yellow sweater  Underwear   Sweat pants   Shetty/Black Suitcase full of clothes   Shoes on the side of suitcase   Slides On the side of the suitcase

## 2025-06-22 NOTE — PLAN OF CARE
"Nursing assessment completed. Patient awake and visible between his room and the lounge throughout the shift. He was admitted on the day shift from [place] ED. He tells writer that he is not here for medications and does not need medications. He states he does not need to be here. He states \"someone put a bug in me\" and claims he does not know who or why. He requested PRN hydroxyzine and trazodone with was effective. He asks if he will discharge on Tuesday. No scheduled medications this shift. Denies SI/SIB or thoughts to harm others. Continue to monitor and assess.           "

## 2025-06-22 NOTE — H&P
"[facility], [place]   Psychiatric History and Physical  Admission date: 6/21/2025        Chief Complaint:   30-year-old male brought to the emergency room by parents, decompensating with delusional statements and auditory hallucinations, noncompliant with meds.        HPI:   30-year-old male living in [place] with his parents.  Single no children.  Has been on disability.    Chart reflects a psych admission 3/6/2025 reporting auditory hallucinations for 2 years, diagnosis of schizophrenia following with [facility], had missed appointments and other medications for 3 weeks.  He was given Seroquel discharged outpatient.    Chart reflects psychiatric admission 20/20 command hallucinations to take medications, took an overdose of mother's medications.    Emergency room notes from 620 indicate parents stated off his medications and decompensating over the last week.  They noted he had done Juma in the past on Invega intramuscular.    Patient reported bugs were crawling out of his throat, heard a pop and thoughts of things wrong with the spinal cord.  Concerned that a tooth have been implanted acting as a speaker.  Not doing well.  In the emergency room was agitated, demanding to leave, placed on a 72-hour hold at 9 PM on 6/20    Records reflect in 2023 had been on Invega 234 mg every 3 weeks and Seroquel 400 mg at bedtime.  Last note 4/23.    On interview he describes this is a \"misunderstanding\".  States been in the hospital 2 years ago.  Notes that he has been having no problems and would like to go home.    I inquired about statements made about a bug crawling out of his throat or a tooth being a speaker.  States those were not concerns.  Denies that he is hearing voices.  Reports his sleep is good appetite is good mood is fine.    Reports he has been taking Invega medication here.  He did not like the Invega injection stating it did not work but he could not identify what it was " supposed to help with.  Denies any has been working with [facility] or taking Seroquel recently.  Had been to [facility] in the past but does not want to go back there.    Denies use of cigarettes alcohol, other drugs.    Denies other active medical problems.          Past Psychiatric History:             Substance Use and History:             Past Medical History:   PAST MEDICAL HISTORY: No past medical history on file.    PAST SURGICAL HISTORY:   Past Surgical History:   Procedure Laterality Date    TONSILLECTOMY & ADENOIDECTOMY  02/13/2007             Family History:   FAMILY HISTORY: No family history on file.        Social History:   Please see the full psychosocial profile from the clinical treatment coordinator.   SOCIAL HISTORY:     Raised in [place], has an older brother.  Reports had some college.  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  Hobbies include videogames.  Not involved in Hinduism.  Denies issues of abuse or trauma  Social History     Tobacco Use    Smoking status: Every Day     Current packs/day: 1.00     Types: Cigarettes    Smokeless tobacco: Former     Types: Chew   Substance Use Topics    Alcohol use: No            Physical ROS:   The patient endorsed  The remainder of 10-point review of systems was negative except as noted in HPI.         PTA Medications:     Medications Prior to Admission   Medication Sig Dispense Refill Last Dose/Taking    hydrOXYzine omer (VISTARIL) 25 MG capsule Take 1 capsule (25 mg) by mouth 3 times daily as needed for anxiety 90 capsule 1 Past Month    QUEtiapine (SEROQUEL) 200 MG tablet Take 1 tablet (200 mg) by mouth daily AND 2 tablets (400 mg) at bedtime. 30 tablet 0           Allergies:     Allergies   Allergen Reactions    No Known Drug Allergy           Labs:     Recent Results (from the past 48 hours)   Hemoglobin A1c    Collection Time: 06/22/25  7:56 AM   Result Value Ref Range    Estimated Average Glucose 111 <117 mg/dL    Hemoglobin A1C 5.5 <5.7 %    Lipid Profile    Collection Time: 06/22/25  7:56 AM   Result Value Ref Range    Cholesterol 177 <200 mg/dL    Triglycerides 222 (H) <150 mg/dL    Direct Measure HDL 27 (L) >=40 mg/dL    LDL Cholesterol Calculated 106 (H) <100 mg/dL    Non HDL Cholesterol 150 (H) <130 mg/dL          Physical and Psychiatric Examination:     /89 (BP Location: Right arm)   Pulse 112   Temp 97.6  F (36.4  C) (Temporal)   Resp 16   SpO2 97%   Weight is 0 lbs 0 oz  There is no height or weight on file to calculate BMI.    Physical Exam:  I have reviewed the physical exam as documented by the medical team and agree with findings and assessment and have no additional findings to add at this time.    Mental Status Exam:  Appearance: Alert, in his room, watching TV  Attitude:  guarded  Eye Contact:  poor   Mood:  anxious  Affect:  mood congruent  Speech:  clear, coherent  Language: fluent and intact in English  Psychomotor, Gait, Musculoskeletal:  no evidence of tardive dyskinesia, dystonia, or tics  Thought Process:  goal oriented  Associations:  no loose associations  Thought Content:  no evidence of suicidal ideation or homicidal ideation, denies presently any auditory hallucinations, bugs in his throat or tooth.  Poverty of content  Insight:  limited  Judgement:  poor  Oriented to: Oriented to June, the 17th  Attention Span and Concentration:  limited  Recent and Remote Memory:  not tested  Fund of Knowledge: not testd         Admission Diagnoses:   Diagnosis of schizophrenia, several year history of reports of psychosis, noncompliance of medications, at one time was on Invega injections.  Appears noncompliant recently with Seroquel.  In the emergency room with delusional statements, reporting auditory hallucinations.    Of concern there was report in 2020 responding to auditory hallucinations, command, by acting and taking an overdose.    Placed on a hold in the emergency room    Clinically Significant Risk Factors                                     # Financial/Environmental Concerns:                     Assessment & Plan:     Patient reported has been taking Invega recently, had some old doses.  Not likely given he was getting Invega injections.  We discussed continuing Invega tablets here indicated he would be willing to do that.  He is most focused on going home.  Reviewed has been placed on a 72-hour hold and that we will continue through the weekend.  He will meet the treatment team tomorrow.  Encouraged him to continue to communicate his symptoms and work with the treatment team.    Disposition Plan   Reason for ongoing admission: is unable to care for self due to severe psychosis or neyda  Discharge location: to be determined  Discharge Medications: not needed  Follow-up Appointments: not needed  Legal Status: 72 hour hold    Entered by: KEATON STUART MD on 6/22/2025 at 12:04 PM

## 2025-06-22 NOTE — PLAN OF CARE
Goal Outcome Evaluation:    Plan of Care Reviewed With: patient      Patient was alert and [place] x 4,calm and cooperative throughout the shift.Patient was visible in the milieu,spending time in the lounge,watching television and engaged in social interaction with staff and peers.Patient was flat in affect and a calm mood.Patient speech was clear and coherent.Patient  showered and well groomed.Patient approached nursing window and requested Trazodone and nicotine gum,both of which was provided.When asked patient denies pain,anxiety,depression and auditory/visual hallucinations.Patient also denies having any thought of self harm or harm to others. Patient was prescribed medications including prn,with no medication side effect reported or observed.Patient consumed 100% of her dinner and hydrated adequately.      /81 (BP Location: Right arm)   Pulse 79   Temp 98.4  F (36.9  C) (Temporal)   Resp 15   SpO2 95%

## 2025-06-22 NOTE — PHARMACY-ADMISSION MEDICATION HISTORY
Pharmacist Admission Medication History    Admission medication history is complete. The information provided in this note is only as accurate as the sources available at the time of the update.    Information Source(s): Patient via phone    Pertinent Information: Patient reported not taking any medications regularly. Takes his quetiapine sporadically and hydroxyzine as needed. Last fill for quetiapine 3/19/2025 for 30 day supply. Since not taking medications regularly, will not erica quetiapine as taking.     Changes made to PTA medication list:  Added: None  Deleted: None  Changed: None    Allergies reviewed with patient and updates made in EHR: yes    Medication History Completed By: Estelita Virk [facility] 6/22/2025 11:37 AM    PTA Med List   Medication Sig Last Dose/Taking    hydrOXYzine omer (VISTARIL) 25 MG capsule Take 1 capsule (25 mg) by mouth 3 times daily as needed for anxiety Past Month

## 2025-06-22 NOTE — PLAN OF CARE
NOC Shift Report    SLEPT:  7 hours overnight.      Safety rounds completed with no issues identified.    PAIN:  No c/o pain or discomfort.    PRNs:  None.    PRECAUTIONS:  SUICIDE.

## 2025-06-22 NOTE — PLAN OF CARE
Problem: Adult Behavioral Health Plan of Care  Goal: Adheres to Safety Considerations for Self and Others  Outcome: Progressing   Goal Outcome Evaluation:         Remains somewhat isolative to his room. Denies depression/SI/SIB. Denies AH/VH today. He feels his hospitalization is a mis-understanding. He feels he does not need to be here. He comes to the RN window for gum only. He states he does not take medications regularly. He is unkempt, he does not tend to his ADL's. Would like to leave here today but a 72 hour was initiated.     He offers no physical complaints.

## 2025-06-23 PROCEDURE — 99232 SBSQ HOSP IP/OBS MODERATE 35: CPT | Performed by: PSYCHIATRY & NEUROLOGY

## 2025-06-23 PROCEDURE — 250N000013 HC RX MED GY IP 250 OP 250 PS 637: Performed by: PSYCHIATRY & NEUROLOGY

## 2025-06-23 PROCEDURE — 124N000002 HC R&B MH UMMC

## 2025-06-23 RX ADMIN — TRAZODONE HYDROCHLORIDE 50 MG: 50 TABLET ORAL at 21:02

## 2025-06-23 RX ADMIN — NICOTINE POLACRILEX 4 MG: 2 GUM, CHEWING BUCCAL at 08:32

## 2025-06-23 RX ADMIN — NICOTINE POLACRILEX 2 MG: 2 GUM, CHEWING BUCCAL at 19:44

## 2025-06-23 RX ADMIN — NICOTINE POLACRILEX 4 MG: 2 GUM, CHEWING BUCCAL at 11:45

## 2025-06-23 RX ADMIN — NICOTINE POLACRILEX 4 MG: 2 GUM, CHEWING BUCCAL at 14:10

## 2025-06-23 ASSESSMENT — ACTIVITIES OF DAILY LIVING (ADL)
DRESS: INDEPENDENT
ADLS_ACUITY_SCORE: 15
HYGIENE/GROOMING: INDEPENDENT
ADLS_ACUITY_SCORE: 15
DRESS: INDEPENDENT
ADLS_ACUITY_SCORE: 15
LAUNDRY: UNABLE TO COMPLETE
ADLS_ACUITY_SCORE: 15
LAUNDRY: UNABLE TO COMPLETE
ORAL_HYGIENE: INDEPENDENT
HYGIENE/GROOMING: INDEPENDENT
ADLS_ACUITY_SCORE: 15
ADLS_ACUITY_SCORE: 15
ORAL_HYGIENE: INDEPENDENT
ADLS_ACUITY_SCORE: 15

## 2025-06-23 NOTE — PLAN OF CARE
BEH IP Unit Acuity Rating Score (UARS)  Patient is given one point for every criteria they meet.    CRITERIA SCORING   On a 72 hour hold, court hold, committed, stay of commitment, or revocation. 1    Patient LOS on BEH unit exceeds 20 days. 0  LOS: 2   Patient under guardianship, 55+, otherwise medically complex, or under age 11. 0   Suicide ideation without relief of precipitating factors. 0   Current plan for suicide. 0   Current plan for homicide. 0   Imminent risk or actual attempt to seriously harm another without relief of factors precipitating the attempt. 0   Severe dysfunction in daily living (ex: complete neglect for self care, extreme disruption in vegetative function, extreme deterioration in social interactions). 1   Recent (last 7 days) or current physical aggression in the ED or on unit. 0   Restraints or seclusion episode in past 72 hours. 0   Recent (last 7 days) or current verbal aggression, agitation, yelling, etc., while in the ED or unit. 0   Active psychosis. 1   Need for constant or near constant redirection (from leaving, from others, etc).  0   Intrusive or disruptive behaviors. 0   Patient requires 3 or more hours of individualized nursing care per 8-hour shift (i.e. for ADLs, meds, therapeutic interventions). 0   TOTAL 3
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
## 2025-06-23 NOTE — PLAN OF CARE
"RN Assessment    SI/SIB/HI: denies  Aggression/Agitation: none  AVH: denies, none observed.    Thought process: poor insight into mental health. Pt states he has never had any mental health issue and has never needed antipsychotics. Pt states \"I just use hydroxyzine to help with sleep sometimes, that's it\"     Sleep: 5.5 hours on NOC, awake throughout the day shift  Affect & Mood: blunted/restricted. Mood is calm per pt. Pt presents as tense.     Behavior: pt refused scheduled invega despite writer providing education. Pt has been intermittently visible in the milieu. Pt did not attend group and is not observed to be social with peers.    Possible Medication SE: none reported or observed  Hygiene: unkempt   Pain: denies  Acute physical concerns: none reported or observed  Appetite & Fluid intake: adequate   Bowel & Bladder: no issues reported    PRN medications utilized this shift include:  nicotine    Problem: Psychotic Signs/Symptoms  Goal: Optimal Cognitive Function (Psychotic Signs/Symptoms)  Outcome: Not Progressing  Plan of Care Reviewed With: patient      "

## 2025-06-23 NOTE — PROVIDER NOTIFICATION
06/23/25 1311   Individualization/Patient Specific Goals   Patient Vulnerabilities history of unsuccessful treatment;lacks insight into illness;housing insecurity;family/relationship conflict;adverse childhood experience(s)   Interprofessional Rounds   Participants nursing;CTC;psychiatrist;pharmacy   Behavioral Team Discussion   Participants Dr Migel MD, Ara COVARRUBIAS, Dilcia BARRERA RN Manager, Christina Sawant, Vera PERSON RN   Progress Per team, slept 5.5 hours, refused Invega this morning. Utox negative. Petitioning for MI commitment and Rios in [place].   Medical/Physical See H&P   Precautions Suicide   Plan Petition for MI commitment and rios, consider IRTS.   Safety Plan To be completed with unit therapist prior to discharge.   Anticipated Discharge Disposition home or self-care;IRTS     Goal Outcome Evaluation:  PRECAUTIONS AND SAFETY    Behavioral Orders   Procedures    Code 1 - Restrict to Unit    Routine Programming     As clinically indicated    Status 15     Every 15 minutes.    Suicide precautions: Suicide Risk: HIGH; Clinical rationale to override score: modification to the care environment     Patients on Suicide Precautions should have a Combination Diet ordered that includes a Diet selection(s) AND a Behavioral Tray selection for Safe Tray - with utensils, or Safe Tray - NO utensils       Suicide Risk:   HIGH     Clinical rationale to override score::   modification to the care environment       Safety  Safety WDL: WDL  Suicidality: Status 15

## 2025-06-23 NOTE — PLAN OF CARE
Team Note Due:  Monday    Assessment/Intervention/Current Symtoms and Care Coordination:  Chart review and met with team, discussed pt progress, symptomology, and response to treatment.  Discussed the discharge plan and any potential impediments to discharge.    Per team, slept 5.5 hours, refused Invega this morning. Utox negative. The plan will be likely to petition for commitment.     I called [facility] to verify if this is his CFR and they stated he is [facility].    72  START: 06/20/25 2252   72 HH END: 06/25/25 2252    I secure emailed per their request, and also secure faxed [facility] the mental health petition for commitment and Rios.     [facility] to confirm receipt of petition. I had to leave voicemail with Charmaine [facility] .     Behavioral team note complete.     Received confirmation from Charmaine [facility]  [phone] that they have received the documents.     Discharge Plan or Goal:  TBD - IRTS?      Barriers to Discharge:  Symptoms     Referral Status:  TBD     Legal Status:  72  START: 06/20/25 2252   72 HH END: 06/25/25 2252  County: [facility]  File Number: Pending  Start and expiration date of commitment: Pending  Petition for MI commitment and Rios submitted 6/23  Rios meds requested are: prolixin, haldol, clozaril, risperdal, zyprexa, invega     Contacts (include MAURICE status):  Gilbert Serrato - Father (No MAURICE) Ph: [phone]      Upcoming Meetings and Dates/Important Information and next steps:  Coordinate care   Commitment process

## 2025-06-23 NOTE — CARE PLAN
06/23/25 1214   Observation Type   How often does the Patient require Staff intervention/redirection? rarely   Harm Category none   No Harm Category Noted at This Time patient in their room, minimal interaction;no applicable harm categories or justifications   Level of Special Monitoring none

## 2025-06-23 NOTE — PLAN OF CARE
Goal Outcome Evaluation:    Plan of Care Reviewed With: patient      NOC Shift Report    Pt in bed at beginning of shift, breathing quiet and unlabored. Pt slept 5.5 hours. Safety round completed with no other issues identified.No PRNs given.  No complaint or concerns at this time. Will continue to monitor.

## 2025-06-24 PROCEDURE — 250N000013 HC RX MED GY IP 250 OP 250 PS 637: Performed by: ANESTHESIOLOGY

## 2025-06-24 PROCEDURE — 124N000002 HC R&B MH UMMC

## 2025-06-24 PROCEDURE — 250N000013 HC RX MED GY IP 250 OP 250 PS 637: Performed by: PSYCHIATRY & NEUROLOGY

## 2025-06-24 RX ADMIN — TRAZODONE HYDROCHLORIDE 50 MG: 50 TABLET ORAL at 20:53

## 2025-06-24 RX ADMIN — PALIPERIDONE 3 MG: 3 TABLET, EXTENDED RELEASE ORAL at 08:48

## 2025-06-24 RX ADMIN — NICOTINE POLACRILEX 2 MG: 2 GUM, CHEWING BUCCAL at 16:43

## 2025-06-24 RX ADMIN — NICOTINE POLACRILEX 2 MG: 2 GUM, CHEWING BUCCAL at 12:40

## 2025-06-24 RX ADMIN — NICOTINE POLACRILEX 4 MG: 2 GUM, CHEWING BUCCAL at 08:48

## 2025-06-24 ASSESSMENT — ACTIVITIES OF DAILY LIVING (ADL)
ADLS_ACUITY_SCORE: 15
ADLS_ACUITY_SCORE: 15
HYGIENE/GROOMING: INDEPENDENT
ADLS_ACUITY_SCORE: 23
LAUNDRY: UNABLE TO COMPLETE
ADLS_ACUITY_SCORE: 15
ADLS_ACUITY_SCORE: 23
ADLS_ACUITY_SCORE: 15
ADLS_ACUITY_SCORE: 23
ADLS_ACUITY_SCORE: 15
ADLS_ACUITY_SCORE: 23
ADLS_ACUITY_SCORE: 23
ADLS_ACUITY_SCORE: 15
ORAL_HYGIENE: INDEPENDENT
HYGIENE/GROOMING: INDEPENDENT
DRESS: INDEPENDENT
ADLS_ACUITY_SCORE: 15
ADLS_ACUITY_SCORE: 15
ADLS_ACUITY_SCORE: 23
ADLS_ACUITY_SCORE: 15

## 2025-06-24 NOTE — PLAN OF CARE
BEH IP Unit Acuity Rating Score (UARS)  Patient is given one point for every criteria they meet.    CRITERIA SCORING   On a 72 hour hold, court hold, committed, stay of commitment, or revocation. 1    Patient LOS on BEH unit exceeds 20 days. 0  LOS: 3   Patient under guardianship, 55+, otherwise medically complex, or under age 11. 0   Suicide ideation without relief of precipitating factors. 0   Current plan for suicide. 0   Current plan for homicide. 0   Imminent risk or actual attempt to seriously harm another without relief of factors precipitating the attempt. 0   Severe dysfunction in daily living (ex: complete neglect for self care, extreme disruption in vegetative function, extreme deterioration in social interactions). 1   Recent (last 7 days) or current physical aggression in the ED or on unit. 0   Restraints or seclusion episode in past 72 hours. 0   Recent (last 7 days) or current verbal aggression, agitation, yelling, etc., while in the ED or unit. 0   Active psychosis. 1   Need for constant or near constant redirection (from leaving, from others, etc).  0   Intrusive or disruptive behaviors. 0   Patient requires 3 or more hours of individualized nursing care per 8-hour shift (i.e. for ADLs, meds, therapeutic interventions). 0   TOTAL 3

## 2025-06-24 NOTE — PLAN OF CARE
Problem: Psychotic Signs/Symptoms  Goal: Improved Psychomotor Symptoms (Psychotic Signs/Symptoms)  Outcome: Progressing  Intervention: Manage Psychomotor Movement   Goal Outcome Evaluation:    Plan of Care Reviewed With: patient      Pt spent his evening shift between his room and lounge. Pt watched TV in the lounge. Pt initiated and took a shower. Ate diner in the lounge. Pt is guarded. Was out in the lounge but did not interact with other peers. Denies pain and psych symptoms including SI, HI, SIB, AVH, paranoia and anxiety. Pt continues to reported that he has leeches in his body. Calm and pleasant to approach. Good appetite. Behavior in control.
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
## 2025-06-24 NOTE — PLAN OF CARE
NOC Shift Report    SLEPT:  6 hours overnight.      Safety rounds completed with no issues identified.    PAIN:  No c/o pain or discomfort.    PRNs:  None.    PRECAUTIONS:  SUICIDE.    Goal Outcome Evaluation:  Problem: Sleep Disturbance  Goal: Adequate Sleep/Rest  Outcome: Progressing
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
## 2025-06-24 NOTE — PLAN OF CARE
Nursing assessment completed. Patient spent time between his room and the lounge. Pleasant upon approach. Affect blunted. Guarded. Poor insight. Does not believe he needs to be in the hospital. See not from . Medication compliant. Denies SI/SIB or thoughts to harm others. Continue to monitor and assess.

## 2025-06-25 PROCEDURE — 99232 SBSQ HOSP IP/OBS MODERATE 35: CPT | Performed by: PSYCHIATRY & NEUROLOGY

## 2025-06-25 PROCEDURE — 250N000013 HC RX MED GY IP 250 OP 250 PS 637: Performed by: PSYCHIATRY & NEUROLOGY

## 2025-06-25 PROCEDURE — 124N000002 HC R&B MH UMMC

## 2025-06-25 PROCEDURE — 250N000013 HC RX MED GY IP 250 OP 250 PS 637: Performed by: ANESTHESIOLOGY

## 2025-06-25 RX ADMIN — OLANZAPINE 10 MG: 10 TABLET, FILM COATED ORAL at 12:57

## 2025-06-25 RX ADMIN — PALIPERIDONE 3 MG: 3 TABLET, EXTENDED RELEASE ORAL at 08:57

## 2025-06-25 RX ADMIN — TRAZODONE HYDROCHLORIDE 50 MG: 50 TABLET ORAL at 19:57

## 2025-06-25 RX ADMIN — TRAZODONE HYDROCHLORIDE 50 MG: 50 TABLET ORAL at 21:55

## 2025-06-25 RX ADMIN — NICOTINE POLACRILEX 4 MG: 2 GUM, CHEWING BUCCAL at 08:21

## 2025-06-25 RX ADMIN — NICOTINE POLACRILEX 4 MG: 2 GUM, CHEWING BUCCAL at 17:19

## 2025-06-25 ASSESSMENT — ACTIVITIES OF DAILY LIVING (ADL)
ADLS_ACUITY_SCORE: 23
DRESS: SCRUBS (BEHAVIORAL HEALTH)
ORAL_HYGIENE: INDEPENDENT
ADLS_ACUITY_SCORE: 23
LAUNDRY: UNABLE TO COMPLETE
ADLS_ACUITY_SCORE: 23
ADLS_ACUITY_SCORE: 23
DRESS: SCRUBS (BEHAVIORAL HEALTH)
ADLS_ACUITY_SCORE: 23
ADLS_ACUITY_SCORE: 23
HYGIENE/GROOMING: INDEPENDENT
ADLS_ACUITY_SCORE: 23
ORAL_HYGIENE: INDEPENDENT
ADLS_ACUITY_SCORE: 23
LAUNDRY: UNABLE TO COMPLETE
ADLS_ACUITY_SCORE: 23
HYGIENE/GROOMING: INDEPENDENT
ADLS_ACUITY_SCORE: 23

## 2025-06-25 NOTE — PLAN OF CARE
BEH IP Unit Acuity Rating Score (UARS)  Patient is given one point for every criteria they meet.    CRITERIA SCORING   On a 72 hour hold, court hold, committed, stay of commitment, or revocation. 1    Patient LOS on BEH unit exceeds 20 days. 0  LOS: 4   Patient under guardianship, 55+, otherwise medically complex, or under age 11. 0   Suicide ideation without relief of precipitating factors. 0   Current plan for suicide. 0   Current plan for homicide. 0   Imminent risk or actual attempt to seriously harm another without relief of factors precipitating the attempt. 0   Severe dysfunction in daily living (ex: complete neglect for self care, extreme disruption in vegetative function, extreme deterioration in social interactions). 1   Recent (last 7 days) or current physical aggression in the ED or on unit. 0   Restraints or seclusion episode in past 72 hours. 0   Recent (last 7 days) or current verbal aggression, agitation, yelling, etc., while in the ED or unit. 0   Active psychosis. 1   Need for constant or near constant redirection (from leaving, from others, etc).  0   Intrusive or disruptive behaviors. 0   Patient requires 3 or more hours of individualized nursing care per 8-hour shift (i.e. for ADLs, meds, therapeutic interventions). 0   TOTAL 3

## 2025-06-25 NOTE — PLAN OF CARE
Problem: Sleep Disturbance  Goal: Adequate Sleep/Rest  Outcome: Progressing   Goal Outcome Evaluation:    Patient slept 6 Hours during the night. Safety checked was done every 15 minutes. No PRN was given. No concerned noted.
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
## 2025-06-25 NOTE — PROGRESS NOTES
[facility], [place]   Psychiatric Progress Note  Hospital Day: 4        Interim History:   The patient's care was discussed with the treatment team during the daily team meeting and/or staff's chart notes were reviewed. Patient did not report any acute medical concerns or side effects other than concerns that he has bugs and leeches in his body. No behavioral issues overnight, including violent or aggressive behaviors. Patient did not require seclusion or restraints. Patient is exhibiting signs/sx of psychosis or neyda. Patient did not endorse suicidal ideation. Patient did not endorse HI. Patient is not medication adherent. Declined scheduled Invega this morning. Patient is not attending groups. Patient is sleeping well. Patient is eating adequately. Patient is not attending to ADLs.    Upon interview, the patient expresses concerns about people drugging him and it resulting in his skin turning blue. Still very much concerned about bugs and leeches in his body. He said that he has a a sonair in his tooth and is communicating with others via this device. He also strongly suspects that law enforcement is monitoring him in the hospital and also that I am communicating with law enforcement. Appeared distress and was actively respondingto internal stimuli.     Suicidal ideation: denies current or recent suicidal ideation or behaviors.    Homicidal ideation: denies current or recent homicidal ideation or behaviors.    Psychotic symptoms:  Patient denies AH, VH, paranoia, delusions.     Medication side effects reported: No significant side effects.    Acute medical concerns: yes, as above    Other issues reported by patient: Patient had no further questions or concerns.           Medications:     Current Facility-Administered Medications   Medication Dose Route Frequency Provider Last Rate Last Admin    paliperidone ER (INVEGA) 24 hr tablet 3 mg  3 mg Oral Daily Joselo Bangura MD   3 mg at  06/25/25 0857          Allergies:     Allergies   Allergen Reactions    No Known Drug Allergy           Labs:   No results found for this or any previous visit (from the past 24 hours).       Psychiatric Examination:     /78   Pulse 70   Temp 97.8  F (36.6  C) (Temporal)   Resp 16   SpO2 98%   Weight is 0 lbs 0 oz  There is no height or weight on file to calculate BMI.    Weight over time:  There were no vitals filed for this visit.    Orthostatic Vitals       None              Cardiometabolic risk assessment. 06/23/25      Reviewed patient profile for cardiometabolic risk factors    Date taken /Value  REFERENCE RANGE   Abdominal Obesity  (Waist Circumference)   See nursing flowsheet Women >=35 in (88 cm)   Men >=40 in (102 cm)      Triglycerides  Triglycerides   Date Value Ref Range Status   06/22/2025 222 (H) <150 mg/dL Final       >=150 mg/dL (1.7 mmol/L) or current treatment for elevated triglycerides   HDL cholesterol  Direct Measure HDL   Date Value Ref Range Status   06/22/2025 27 (L) >=40 mg/dL Final   ]   Women <50 mg/dL (1.3 mmol/L) in women or current treatment for low HDL cholesterol  Men <40 mg/dL (1 mmol/L) in men or current treatment for low HDL cholesterol     Fasting plasma glucose (FPG) Lab Results   Component Value Date     06/20/2025    GLC 80 10/02/2020      FPG >=100 mg/dL (5.6 mmol/L) or treatment for elevated blood glucose   Blood pressure  BP Readings from Last 3 Encounters:   06/25/25 108/78   06/21/25 121/77   03/06/25 (!) 142/86    Blood pressure >=130/85 mmHg or treatment for elevated blood pressure   Family History  See family history     Mental Status Exam:  Appearance: awake, alert and unkempt  Attitude:  evasive, guarded, and somewhat cooperative  Eye Contact:  good  Mood:  anxious  Affect:  mood congruent and intensity is blunted  Speech:  clear, coherent  Language: fluent and intact in English  Psychomotor, Gait, Musculoskeletal:  no evidence of tardive dyskinesia,  dystonia, or tics  Throught Process:  disorganized and illogical  Associations:  no loose associations  Thought Content:  no evidence of suicidal ideation or homicidal ideation and patient appears to be responding to internal stimuli  Insight:  limited  Judgement:  limited  Oriented to:  time, person, and place  Attention Span and Concentration:  fair  Recent and Remote Memory:  fair  Fund of Knowledge:  low-normal           Precautions:     Behavioral Orders   Procedures    Code 1 - Restrict to Unit    Routine Programming     As clinically indicated    Status 15     Every 15 minutes.    Suicide precautions: Suicide Risk: HIGH; Clinical rationale to override score: modification to the care environment     Patients on Suicide Precautions should have a Combination Diet ordered that includes a Diet selection(s) AND a Behavioral Tray selection for Safe Tray - with utensils, or Safe Tray - NO utensils       Suicide Risk:   HIGH     Clinical rationale to override score::   modification to the care environment          Diagnoses:     Schizophrenia, decompensated    Clinically Significant Risk Factors                                    # Financial/Environmental Concerns:                       Assessment & Plan:     Assessment and hospital summary:  30-year-old male brought to the emergency room by parents, decompensating with delusional statements and auditory hallucinations, noncompliant with meds. Declining Invega today and does not believe he has a mental illness. Family expressing concerns about safety. Will file for MI commitment and Rios on 6/23.       Today's Changes:  None    Target psychiatric symptoms and interventions:  Invega 3 mg daily. Pt may decline.    Risks, benefits, and alternatives discussed at length with patient.     Acute Medical Problems and Treatments:  Acute medical concerns:  - No acute medical concerns    Pertinent labs/imaging:  Dyslipidemia noted on lipid profile dated  6/22    Behavioral/Psychological/Social:  - Encourage unit programming    Safety:  - Continue precautions as noted above  - Status 15 minute checks    Legal Status: 72 hour hold    Disposition Plan   Reason for ongoing admission: is unable to care for self due to severe psychosis or neyda  Discharge location: TBD  Discharge Medications: not ordered  Follow-up Appointments: not scheduled    Entered by: Jo-Ann Lainez MD on 06/25/2025  at 9:31 AM

## 2025-06-25 NOTE — PLAN OF CARE
Team Note Due:  Monday    Assessment/Intervention/Current Symtoms and Care Coordination:  Chart review, no team.    Per chart review, pt reporting having leeches in his body and in his throat. Slept 6 hours. Continued delusional content. Pt has been visible in the lounge and spends some time in his room. Mostly does not interact with anyone. He doesn't feel he needs meds and denies having a mental illness. Reports that his penis is turing blue from the leeches. Thinks he's had surgery in the past to help it. Pt denies needing anyone to look at it. Asked for an x-ray to find bugs in his neck. Provider reports that pt is not responding to reality testing.    CTC met with pt and he requested to have help with the bugs and leeches on his body and throat. Pt stated that he was concerned with his penis turning blue from the leeches and CTC asked if he would like nursing to examine it to ensure he was okay. Pt stated that he came to the hospital to help get rid of the parasites and now does not understand why he is here. Pt agreed to have nursing examine him.     [employer] PPT team supports commitment and will have the court paperwork completed and filed today.     [facility] sent paperwork. Email sent to Risk Management to confirm court hold.     [employer] sent Official Court Hold paperwork at 4:45pm. Copy was put in chart and copy given to pt. Court dated sent to Care Coordinators.    Discharge Plan or Goal:  TBD - IRTS?      Barriers to Discharge:  Symptoms     Referral Status:  TBD     Legal Status:  Court Hold  [employer]: [facility]  File Number: [id number]   Preliminary/Probable Cause Hearin2025 @ 2pm  Commitment and Rios Hearing: 2025 @ 02:30 PM    Petition for MI commitment and Rios submitted   Rios meds requested are: prolixin, haldol, clozaril, risperdal, zyprexa, invega   PPT: Sindi Montes [phone]  Contacts (include MAURICE status):  Gilbert Serrato - Father (No MAURICE) Ph:  [phone]      Upcoming Meetings and Dates/Important Information and next steps:  Coordinate care   Commitment process  Update provider about time and date of hearings when known for testifying Preliminary/Probable Cause Hearin2025 @ 2pm  Commitment and Rios Hearing: 2025 @ 02:30 PM

## 2025-06-26 VITALS
RESPIRATION RATE: 16 BRPM | OXYGEN SATURATION: 96 % | DIASTOLIC BLOOD PRESSURE: 67 MMHG | SYSTOLIC BLOOD PRESSURE: 102 MMHG | TEMPERATURE: 97.1 F | HEART RATE: 97 BPM

## 2025-06-26 PROCEDURE — 124N000002 HC R&B MH UMMC

## 2025-06-26 PROCEDURE — 99232 SBSQ HOSP IP/OBS MODERATE 35: CPT | Performed by: PSYCHIATRY & NEUROLOGY

## 2025-06-26 PROCEDURE — 250N000013 HC RX MED GY IP 250 OP 250 PS 637: Performed by: ANESTHESIOLOGY

## 2025-06-26 PROCEDURE — 250N000013 HC RX MED GY IP 250 OP 250 PS 637: Performed by: PSYCHIATRY & NEUROLOGY

## 2025-06-26 RX ADMIN — NICOTINE POLACRILEX 4 MG: 2 GUM, CHEWING BUCCAL at 13:57

## 2025-06-26 RX ADMIN — PALIPERIDONE 3 MG: 3 TABLET, EXTENDED RELEASE ORAL at 08:17

## 2025-06-26 RX ADMIN — OLANZAPINE 10 MG: 10 TABLET, FILM COATED ORAL at 10:59

## 2025-06-26 ASSESSMENT — ACTIVITIES OF DAILY LIVING (ADL)
ADLS_ACUITY_SCORE: 23

## 2025-06-26 NOTE — PLAN OF CARE
Team Note Due:  Monday     Assessment/Intervention/Current Symtoms and Care Coordination:  Chart review, + team.      Per team, pt has been calling 911 and his dad trying to get out of here. Pt has no insight, continues to report leeches in his body, penis is turning blue - 2 RN's checked and it was not blue.     Preliminary/Probable Cause Hearin2025 @ 2pm     Discharge Plan or Goal:  TBD - IRTS?      Barriers to Discharge:  Symptoms     Referral Status:  TBD     Legal Status:  Court Hold  County: [place]  File Number: [id number]   Preliminary/Probable Cause Hearin2025 @ 2pm  Commitment and Rios Hearing: 2025 @ 02:30 PM     Petition for MI commitment and Rios submitted   Rios meds requested are: prolixin, haldol, clozaril, risperdal, zyprexa, invega   PPT: Sindi Montes [phone]  Contacts (include MAURICE status):  Gilbert Serrato - Father (No MAURICE) Ph: [phone]      Upcoming Meetings and Dates/Important Information and next steps:  Coordinate care   Commitment process  Update provider about time and date of hearings when known for testifying   Preliminary/Probable Cause Hearin2025 @ 2pm  Commitment and Rios Hearing: 2025 @ 02:30 PM

## 2025-06-26 NOTE — PLAN OF CARE
"  Problem: Psychotic Signs/Symptoms  Goal: Improved Behavioral Control (Psychotic Signs/Symptoms)  Outcome: Progressing  Intervention: Manage Behavior  Recent Flowsheet Documentation  Taken 6/25/2025 1800 by Jass Cortez RN  De-Escalation Techniques:   quiet time facilitated   reoriented   Goal Outcome Evaluation:    Plan of Care Reviewed With: patient      Patient is now on a court hold and was given a copy of the paper work with the . His Commitment and Rios hearing is on July 9th, 2025. Patient doesn't believe he has to be in the hospital and he was stating \"  I will cor[place] [place]\". Patient appears to be in denial about his mental illness and doesn't have an insight.    Patient has been calm and cooperative upon approach except one time with another female RN who reported he was frustrated toward the RN during Nicorette administration. He denies having any thoughts of wanting to harm himself or others, depression, anxiety, visual hallucinations/auditory hallucinations. Patient appears with flat affect and doesn't want to engage in conversation. He came to request for PRN Trazodone at bed time, and was given 50 mg. His vitals are with in normal limits.       Blood pressure 108/78, pulse 70, temperature 97.8  F (36.6  C), temperature source Temporal, resp. rate 16, SpO2 98%.               "

## 2025-06-26 NOTE — PLAN OF CARE
Goal Outcome Evaluation:    Plan of Care Reviewed With: patient      Patient was calm,pleasant and cooperative throughout the evening shift.Patient was visible in the milieu, where he spent the majority of the shift, frequently pacing the halls,standing by the nursing window,and watching television in lounge.Patient was isolative and withdrawn,with limited interactions with both peers or staff,unless approached. Patient endorsed auditory hallucination, reported that he heard voices he attributed to law enforcement speaking to him through the ceiling of his room.He stated that the voices made him feel agitated.However,he reported  a decreases in frequency and intensity of the voices after dinner.Patient denied pain or discomfort, anxiety,and depression.Patient denies having any thought of self harm or harm to others. Patient reported he feels safe in here.Patient consumed 100% his dinner and adequately hydrated.Patient demonstrated appropriate behavior throughout the shift and remained compliant with unit rules and staff direction.    /67 (BP Location: Left arm)   Pulse 97   Temp 97.1  F (36.2  C) (Temporal)   Resp 16   SpO2 96%

## 2025-06-26 NOTE — PROGRESS NOTES
"[facility], [place]   Psychiatric Progress Note  Hospital Day: 5        Interim History:   The patient's care was discussed with the treatment team during the daily team meeting and/or staff's chart notes were reviewed. Patient did not report any acute medical concerns or side effects other than concerns that he has bugs and leeches in his body. Additionally believes that his penis is turning blue but RN assessed and there was no objective evidence of this. Patient has been calm and cooperative upon approach except one time with another female RN who reported he was frustrated toward the RN during Nicorette administration. Patient did not require seclusion or restraints. Patient is exhibiting signs/sx of psychosis or neyda. Patient did not endorse suicidal ideation. Patient did not endorse HI. Patient is not medication adherent. Declined scheduled Invega this morning. Patient is not attending groups. Patient is sleeping well. Patient is eating adequately. Patient is attending to ADLs, showered yesterday. He called 911 overnight.     Upon interview, the patient remains quite distressed about being \"stalked\" by a FBI agent. He said that he can hear him through the speakers in the ceiling. Still very concerned about a leech in his body and a bug in his throat. Was receptive to taking prn Zyprexa for agitation/distress.     Suicidal ideation: denies current or recent suicidal ideation or behaviors.    Homicidal ideation: denies current or recent homicidal ideation or behaviors.    Psychotic symptoms:  AH, profound delusional and paranoid thought content.     Medication side effects reported: No significant side effects.    Acute medical concerns: yes, as above    Other issues reported by patient: Patient had no further questions or concerns.           Medications:     Current Facility-Administered Medications   Medication Dose Route Frequency Provider Last Rate Last Admin    paliperidone ER " (INVEGA) 24 hr tablet 3 mg  3 mg Oral Daily Joselo Bangura MD   3 mg at 06/26/25 0817          Allergies:     Allergies   Allergen Reactions    No Known Drug Allergy           Labs:   No results found for this or any previous visit (from the past 24 hours).       Psychiatric Examination:     /78   Pulse 90   Temp 98.1  F (36.7  C) (Temporal)   Resp 16   SpO2 95%   Weight is 0 lbs 0 oz  There is no height or weight on file to calculate BMI.    Weight over time:  There were no vitals filed for this visit.    Orthostatic Vitals       None              Cardiometabolic risk assessment. 06/23/25      Reviewed patient profile for cardiometabolic risk factors    Date taken /Value  REFERENCE RANGE   Abdominal Obesity  (Waist Circumference)   See nursing flowsheet Women >=35 in (88 cm)   Men >=40 in (102 cm)      Triglycerides  Triglycerides   Date Value Ref Range Status   06/22/2025 222 (H) <150 mg/dL Final       >=150 mg/dL (1.7 mmol/L) or current treatment for elevated triglycerides   HDL cholesterol  Direct Measure HDL   Date Value Ref Range Status   06/22/2025 27 (L) >=40 mg/dL Final   ]   Women <50 mg/dL (1.3 mmol/L) in women or current treatment for low HDL cholesterol  Men <40 mg/dL (1 mmol/L) in men or current treatment for low HDL cholesterol     Fasting plasma glucose (FPG) Lab Results   Component Value Date     06/20/2025    GLC 80 10/02/2020      FPG >=100 mg/dL (5.6 mmol/L) or treatment for elevated blood glucose   Blood pressure  BP Readings from Last 3 Encounters:   06/26/25 120/78   06/21/25 121/77   03/06/25 (!) 142/86    Blood pressure >=130/85 mmHg or treatment for elevated blood pressure   Family History  See family history     Mental Status Exam:  Appearance: awake, alert and unkempt  Attitude:  evasive, guarded, and somewhat cooperative  Eye Contact:  good  Mood:  very anxious  Affect:  mood congruent and intensity is heightened  Speech:  clear, coherent  Language: fluent and intact  in English  Psychomotor, Gait, Musculoskeletal:  no evidence of tardive dyskinesia, dystonia, or tics  Throught Process:  disorganized and illogical  Associations:  no loose associations  Thought Content:  no evidence of suicidal ideation or homicidal ideation and patient appears to be responding to internal stimuli  Insight:  limited  Judgement:  limited  Oriented to:  time, person, and place  Attention Span and Concentration:  fair  Recent and Remote Memory:  fair  Fund of Knowledge:  low-normal           Precautions:     Behavioral Orders   Procedures    Code 1 - Restrict to Unit    Routine Programming     As clinically indicated    Status 15     Every 15 minutes.    Suicide precautions: Suicide Risk: HIGH; Clinical rationale to override score: modification to the care environment     Patients on Suicide Precautions should have a Combination Diet ordered that includes a Diet selection(s) AND a Behavioral Tray selection for Safe Tray - with utensils, or Safe Tray - NO utensils       Suicide Risk:   HIGH     Clinical rationale to override score::   modification to the care environment          Diagnoses:     Schizophrenia, decompensated    Clinically Significant Risk Factors                                    # Financial/Environmental Concerns:                       Assessment & Plan:     Assessment and hospital summary:  30-year-old male brought to the emergency room by parents, decompensating with delusional statements and auditory hallucinations, noncompliant with meds. Declining Invega today and does not believe he has a mental illness. Family expressing concerns about safety. Will file for MI commitment and Rios on 6/23.     Today's Changes:  None. Plan is to optimize PTA Invega once Rios is in place. Not currently meeting criteria for psychiatric emergency.     Target psychiatric symptoms and interventions:  Invega 3 mg daily. Pt may decline.    Risks, benefits, and alternatives discussed at length with  patient.     Acute Medical Problems and Treatments:  Acute medical concerns:  - No acute medical concerns    Pertinent labs/imaging:  Dyslipidemia noted on lipid profile dated 6/22    Behavioral/Psychological/Social:  - Encourage unit programming    Safety:  - Continue precautions as noted above  - Status 15 minute checks    Legal Status: court hold.     Disposition Plan   Reason for ongoing admission: is unable to care for self due to severe psychosis or neyda  Discharge location: TBD  Discharge Medications: not ordered  Follow-up Appointments: not scheduled    Entered by: Jo-Ann Lainez MD on 06/26/2025  at 9:54 AM

## 2025-06-26 NOTE — PLAN OF CARE
NOC Shift Report    SLEPT:  7 hours overnight.      Safety rounds completed with no issues identified.    PAIN:  No c/o pain or discomfort.    PRNs:  None.    PRECAUTIONS:  SUICIDE.    Goal Outcome Evaluation:  Problem: Sleep Disturbance  Goal: Adequate Sleep/Rest  Outcome: Progressing

## 2025-06-26 NOTE — PLAN OF CARE
"Goal Outcome Evaluation:    Patient was calm and cooperative.Patient was visible in the milieu,isolative and withdrawn to self, observed watching television,pacing the halls and sitting in the lounge area.Patient presented with a blunted affect and irritable, mood.Patient avoids social contact,and observed speech was pressured with a guarded behavior.Patient insight and judgement are not appropriate.    When asked patient denies having any thought of self harm or harm to others.Patient also denies anxiety,depression and Auditory/visual hallucinations.Patient additionally denies pain or discomfort.Patient appeared responding to internal stimuli,observed talking self in his room.When approached patient exhibited delusional statement,\"I am not safe in here,prefer to in long term,and alf is the safe place.\" Patient consumed 100% his dinner and adequately hydrated.Patient was cooperative,with nursing assessment,vital sign monitoring and medication administration.    /78   Pulse 90   Temp 98.1  F (36.7  C) (Temporal)   Resp 16   SpO2 95%     "

## 2025-06-27 PROCEDURE — 250N000013 HC RX MED GY IP 250 OP 250 PS 637: Performed by: PSYCHIATRY & NEUROLOGY

## 2025-06-27 PROCEDURE — 99232 SBSQ HOSP IP/OBS MODERATE 35: CPT | Performed by: PSYCHIATRY & NEUROLOGY

## 2025-06-27 PROCEDURE — 124N000002 HC R&B MH UMMC

## 2025-06-27 PROCEDURE — 250N000013 HC RX MED GY IP 250 OP 250 PS 637: Performed by: ANESTHESIOLOGY

## 2025-06-27 RX ADMIN — HYDROXYZINE HYDROCHLORIDE 25 MG: 25 TABLET, FILM COATED ORAL at 01:35

## 2025-06-27 RX ADMIN — NICOTINE POLACRILEX 2 MG: 2 GUM, CHEWING ORAL at 18:48

## 2025-06-27 RX ADMIN — NICOTINE POLACRILEX 4 MG: 2 GUM, CHEWING ORAL at 11:34

## 2025-06-27 RX ADMIN — OLANZAPINE 10 MG: 10 TABLET, FILM COATED ORAL at 14:24

## 2025-06-27 RX ADMIN — TRAZODONE HYDROCHLORIDE 50 MG: 50 TABLET ORAL at 01:34

## 2025-06-27 RX ADMIN — PALIPERIDONE 3 MG: 3 TABLET, EXTENDED RELEASE ORAL at 09:01

## 2025-06-27 RX ADMIN — HYDROXYZINE HYDROCHLORIDE 25 MG: 25 TABLET, FILM COATED ORAL at 18:48

## 2025-06-27 ASSESSMENT — ACTIVITIES OF DAILY LIVING (ADL)
ADLS_ACUITY_SCORE: 23
DRESS: INDEPENDENT
ADLS_ACUITY_SCORE: 23
ADLS_ACUITY_SCORE: 23
HYGIENE/GROOMING: INDEPENDENT
ADLS_ACUITY_SCORE: 23
ADLS_ACUITY_SCORE: 23
ORAL_HYGIENE: INDEPENDENT
ADLS_ACUITY_SCORE: 23
LAUNDRY: UNABLE TO COMPLETE
ADLS_ACUITY_SCORE: 23
ORAL_HYGIENE: INDEPENDENT
ADLS_ACUITY_SCORE: 23
DRESS: INDEPENDENT
ADLS_ACUITY_SCORE: 23
HYGIENE/GROOMING: INDEPENDENT
ADLS_ACUITY_SCORE: 23
LAUNDRY: UNABLE TO COMPLETE

## 2025-06-27 NOTE — PLAN OF CARE
Team Note Due:  Monday     Assessment/Intervention/Current Symtoms and Care Coordination:  Chart review, + team.      Per team, pt has reported police speaking to him through the speakers. Slept 4 hours.     Preliminary/Probable Cause Hearing today 25 @ 2pm    Final hearing is not until 25.     PPS Sindi Montes asked someone to be present for the preliminary though they did not ask me to say anything. After court pt was pacing and appeared irritated. He said something about leeches.     Received document that orders he stay here until final hearing.  Since pt already heard this in court, will give him the paperwork after once less agitated.    Discharge Plan or Goal:  TBD - IRTS?      Barriers to Discharge:  Symptoms     Referral Status:  TBD     Legal Status:  Court Hold  County: [place]  File Number: [id number]   Preliminary/Probable Cause Hearin2025 @ 2pm  Commitment and Rios Hearing: 2025 @ 02:30 PM     Petition for MI commitment and Rios submitted   Rios meds requested are: prolixin, haldol, clozaril, risperdal, zyprexa, invega   PPT: Sindi Montes [phone]  Contacts (include MAURICE status):  Gilbert Serrato - Father (No MAURICE) Ph: [phone]      Upcoming Meetings and Dates/Important Information and next steps:  Coordinate care   Commitment process  Update provider about time and date of hearings when known for testifying   Preliminary/Probable Cause Hearin2025 @ 2pm  Commitment and Rios Hearing: 2025 @ 02:30 PM

## 2025-06-27 NOTE — PLAN OF CARE
"Nursing Assessment    Recent Vitals: B/P: 115/78, T: 97.9, P: 83, R: 16     General Shift Summary  Patient has been in the milieu and has been social with select peers. During morning medication pass he was guarded, presented with a flat affect, and stated that he is frustrated that he is still here.    Later at 1400, patient presented as tense, pacing, and was responding to internal stimuli in an agitated manner. He stated that he was angry and didn't feel safe since the government was listening to him through the speakers and they were going to come after him. He was provided reassurance that he is in a safe place. He stated that the government is persistent and will find a way in. He mentioned a few times that there is a collado in his stomach. Then he said it was in the right side of his chest. He said that the government talks through the speakers. He looked up at the ceiling and said \"Shut up! Shut up you stupid pedophile!\". With encouragement, he took PRN Zyprexa 10mg at 1424. Will continue to monitor patient's respond to the PRN Zyprexa.    Shelia Burton RN MSN  "

## 2025-06-27 NOTE — PLAN OF CARE
Problem: Sleep Disturbance  Goal: Adequate Sleep/Rest  Outcome: Progressing   Goal Outcome Evaluation:[id number]: Pt sitting in the lounge area at the start of the shift, requested and received trazadone 50 mg for C/o insomnia with good effect.Went to room/ bed following intervention and slept for 4 hours. Continue on suicide precaution. No SI related behavior noted.Safety maintained.

## 2025-06-27 NOTE — CARE PLAN
06/27/25 0300   Observation Type   How often does the Patient require Staff intervention/redirection? occasionally   Harm Category none   No Harm Category Noted at This Time no applicable harm categories or justifications   Level of Special Monitoring none
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
## 2025-06-28 PROCEDURE — 124N000002 HC R&B MH UMMC

## 2025-06-28 PROCEDURE — 250N000013 HC RX MED GY IP 250 OP 250 PS 637: Performed by: PSYCHIATRY & NEUROLOGY

## 2025-06-28 PROCEDURE — 250N000013 HC RX MED GY IP 250 OP 250 PS 637: Performed by: ANESTHESIOLOGY

## 2025-06-28 RX ORDER — HYDROXYZINE HYDROCHLORIDE 50 MG/1
50 TABLET, FILM COATED ORAL EVERY 4 HOURS PRN
Status: DISPENSED | OUTPATIENT
Start: 2025-06-28

## 2025-06-28 RX ORDER — QUETIAPINE FUMARATE 50 MG/1
50-100 TABLET, FILM COATED ORAL 3 TIMES DAILY PRN
Status: DISCONTINUED | OUTPATIENT
Start: 2025-06-28 | End: 2025-07-09

## 2025-06-28 RX ADMIN — TRAZODONE HYDROCHLORIDE 50 MG: 50 TABLET ORAL at 20:00

## 2025-06-28 RX ADMIN — NICOTINE POLACRILEX 2 MG: 2 GUM, CHEWING ORAL at 19:34

## 2025-06-28 RX ADMIN — OLANZAPINE 10 MG: 10 TABLET, FILM COATED ORAL at 20:00

## 2025-06-28 RX ADMIN — PALIPERIDONE 3 MG: 3 TABLET, EXTENDED RELEASE ORAL at 08:38

## 2025-06-28 RX ADMIN — TRAZODONE HYDROCHLORIDE 50 MG: 50 TABLET ORAL at 21:50

## 2025-06-28 ASSESSMENT — ACTIVITIES OF DAILY LIVING (ADL)
ADLS_ACUITY_SCORE: 23
HYGIENE/GROOMING: INDEPENDENT
ADLS_ACUITY_SCORE: 23
LAUNDRY: UNABLE TO COMPLETE
ADLS_ACUITY_SCORE: 23
HYGIENE/GROOMING: INDEPENDENT
ADLS_ACUITY_SCORE: 23
ADLS_ACUITY_SCORE: 23
ORAL_HYGIENE: INDEPENDENT
ADLS_ACUITY_SCORE: 23
DRESS: INDEPENDENT
DRESS: INDEPENDENT
LAUNDRY: UNABLE TO COMPLETE
ADLS_ACUITY_SCORE: 23
ORAL_HYGIENE: INDEPENDENT
ADLS_ACUITY_SCORE: 23

## 2025-06-28 NOTE — PLAN OF CARE
Problem: Sleep Disturbance  Goal: Adequate Sleep/Rest  Outcome: Progressing   Goal Outcome Evaluation:Slept all night/continues to sleep.Will continue to monitor and assess pt  Provided safe environment and structure.Slept for 7 hours this shift. Safety maintained

## 2025-06-28 NOTE — PLAN OF CARE
"  Problem: Psychotic Signs/Symptoms  Goal: Improved Psychomotor Symptoms (Psychotic Signs/Symptoms)  Outcome: Progressing   Goal Outcome Evaluation:    Plan of Care Reviewed With: patient      Pt requested to talk to writer. Stated \" am I going to meet the  on Monday?\" Pt was reminded that his final court hearing will be on 7/9/25. Pt then said ok and walked back to his room. Pt requested and received prn of nicotine gum twice. At bed time, pt requested  and received  prn of trazodone and Zyprexa. Pt did not report any foot pain or other body parts. He spent most of the shift out in the lounge. He did play video games but he did not interact with peers. No behavioral or physical concerns reported this shift.          "

## 2025-06-28 NOTE — PLAN OF CARE
"Nursing Assessment    Mental Status Exam:  Appearance:  Adequately groomed  Behavior/relationship to examiner/demeanor:  Cooperative and Pleasant  Affect (objective appearance):  Anxious/Nervous  Mood (subjective report):  depressed, distraught, frustrated, anxious, fearful, and worried  Gait:  Normal  Speech rate, volume, coherence:  Normal, Normal, Normal  Thought process (Rate):  Normal  Abnormal Perception:  Hallucinations and Illusions  Attention/Concentration:  Fair,  Oriented to person  Insight:  Poor  Judgment:  Poor    Suicide Assessment:   Recent suicidal thoughts: No   Any attempts in the last 24 hours: No  Sleep:  Hours of sleep at night: 7    General Shift Summary  Patient has been present in the [facility], social with select peers, and makes his need known to staff. He presented as less tense compared to yesterday. He voiced feeling very depressed and anxious. \"I don't think my current meds are working enough.\". Patient wanted another medication for anxiety. When asking what has worked in the past he stated he couldn't remember. Provider was updated and a Seroquel 50-100mg order was obtained. Patient stated that this medication is for night time. Writer educated him that it can be given for anxiety at anytime. He declined and said he would only take this during the night. Patient made comments about leeches being in him and how he is trying to work through this.    Patient wanted writer to assess his left foot. He stated that he was pretty sure there were ants in his foot. Upon looking at his foot, no redness present, warm to touch, he moved his foot upon touch, and there no injuries or open skin visible. Skin color was WDL. He then stated his foot was numb due to ant eggs being laid in it. He was provided reassurance that based upon my nursing assessment, I was confident there were no ants in his foot. Patient said \"It must be the leeches then.\".    Shelia Burton, RN MSN  "

## 2025-06-28 NOTE — CARE PLAN
06/28/25 0300   Observation Type   How often does the Patient require Staff intervention/redirection? unpredictably   Harm Category none   No Harm Category Noted at This Time patient in their room, minimal interaction   Level of Special Monitoring milieu management

## 2025-06-28 NOTE — PLAN OF CARE
"Goal Outcome Evaluation:    Plan of Care Reviewed With: patient      Pt was out in the milieu. Pt was observed playing video games along with other peers. Pt denies pain and psych symptoms. Pt continues to have some auditory and visual hallucinations. Pt was observed talking to people who are not there in his room. Pt also reported skin discoloration in one of his forearms. But when writer looked at both arms, the skin color is the same. Writer informed him that his skin color is the same for both arms. Pt's parents called to inform him that they are planing to visit him, but pt declined to see his parents. \" I don't want to see them.\" His parents were not able to visit. Pt also asked about his discharge planning. He has his final court hearing on 7/9/25. Pt asked \" am I leaving after the court ?\" Writer educated pt on commitment process.  Pt has good appetite. No physical and behavioral concerns the rest of the shift.    "

## 2025-06-29 PROCEDURE — 250N000013 HC RX MED GY IP 250 OP 250 PS 637: Performed by: PSYCHIATRY & NEUROLOGY

## 2025-06-29 PROCEDURE — 250N000013 HC RX MED GY IP 250 OP 250 PS 637: Performed by: NURSE PRACTITIONER

## 2025-06-29 PROCEDURE — 124N000002 HC R&B MH UMMC

## 2025-06-29 PROCEDURE — 250N000013 HC RX MED GY IP 250 OP 250 PS 637: Performed by: ANESTHESIOLOGY

## 2025-06-29 RX ADMIN — QUETIAPINE FUMARATE 50 MG: 50 TABLET ORAL at 20:04

## 2025-06-29 RX ADMIN — PALIPERIDONE 3 MG: 3 TABLET, EXTENDED RELEASE ORAL at 08:07

## 2025-06-29 RX ADMIN — TRAZODONE HYDROCHLORIDE 50 MG: 50 TABLET ORAL at 20:04

## 2025-06-29 RX ADMIN — NICOTINE POLACRILEX 2 MG: 2 GUM, CHEWING ORAL at 13:10

## 2025-06-29 RX ADMIN — OLANZAPINE 10 MG: 10 TABLET, FILM COATED ORAL at 11:18

## 2025-06-29 ASSESSMENT — ACTIVITIES OF DAILY LIVING (ADL)
ADLS_ACUITY_SCORE: 23
ADLS_ACUITY_SCORE: 23
HYGIENE/GROOMING: INDEPENDENT
ADLS_ACUITY_SCORE: 23
LAUNDRY: UNABLE TO COMPLETE
LAUNDRY: UNABLE TO COMPLETE
ADLS_ACUITY_SCORE: 23
HYGIENE/GROOMING: INDEPENDENT
ADLS_ACUITY_SCORE: 23
DRESS: INDEPENDENT
ADLS_ACUITY_SCORE: 23
ADLS_ACUITY_SCORE: 23
DRESS: INDEPENDENT
ADLS_ACUITY_SCORE: 23
ADLS_ACUITY_SCORE: 23
ORAL_HYGIENE: INDEPENDENT
ORAL_HYGIENE: INDEPENDENT
ADLS_ACUITY_SCORE: 23

## 2025-06-29 NOTE — PLAN OF CARE
Goal Outcome Evaluation:    Plan of Care Reviewed With: patient      Pt spent most of the evening shift out in the lounge. Pt watched TV, and rode exercise bike. Pt declined group activities. Pt kept to self. Did not interact with peers.  Denies pain and psych symptoms including SI, HI, SIB, AVH. Pt did not report any somatic complaint. Pt also did not display or reported any delusional statement. Requested and received two prn of trazodone and Seroquel at bed time. Pt had good appetite. Medications compliant. No behavior or physical concerns reported or noticed throughout the shift.

## 2025-06-29 NOTE — PLAN OF CARE
Nursing Assessment    Recent Vitals: B/P: 127/81, T: 97.4, P: 78, R: 16     Mental Status Exam:  Appearance:  Poorly groomed  Behavior/relationship to examiner/demeanor:  Cooperative and Guarded  Affect (objective appearance):  Reactive/Full range and Anxious/Nervous  Mood (subjective report):  depressed, anxious, nervous, and worried  Gait:  Normal  Speech rate, volume, coherence:  Normal, Normal, Normal  Abnormal Perception:  Hallucinations and Illusions  Attention/Concentration:  Poor,  Oriented to person, Oriented to place, and Oriented to date/time  Insight:  Poor  Judgment:  Poor    Suicide Assessment:   Recent suicidal thoughts: No   Any attempts in the last 24 hours: No    Sleep:  Hours of sleep at night: 7    General Shift Summary  Patient has been present in the milieu, social with select peers, and has been cooperative with care. He voiced having leeches in his body. Patient said he continues to feel depressed and anxious. He stated he was frustrated with his care and wanted to speak with the provider. Writer asked if there was anything I could help with or tell the covering provider and he shook his head no. He requested for Zyprexa PRN 10mg at 1115 and received this.    Shelia Burton, RN MSN

## 2025-06-29 NOTE — PLAN OF CARE
Goal Outcome Evaluation:  Problem: Sleep Disturbance  Goal: Adequate Sleep/Rest  Outcome: Progressing    NOC Shift Report     Pt in bed at beginning of shift, breathing quiet and unlabored. Pt slept through shift. Pt slept 7 hours.      No pt complaints or concerns at this time.      No PRNs given. Will continue to monitor.

## 2025-06-29 NOTE — CARE PLAN
06/29/25 0454   Observation Type   How often does the Patient require Staff intervention/redirection? unpredictably   Harm Category none   No Harm Category Noted at This Time patient sleeping, no interaction   Level of Special Monitoring none

## 2025-06-30 PROCEDURE — 250N000013 HC RX MED GY IP 250 OP 250 PS 637: Performed by: NURSE PRACTITIONER

## 2025-06-30 PROCEDURE — 124N000002 HC R&B MH UMMC

## 2025-06-30 PROCEDURE — 250N000013 HC RX MED GY IP 250 OP 250 PS 637: Performed by: ANESTHESIOLOGY

## 2025-06-30 PROCEDURE — 99232 SBSQ HOSP IP/OBS MODERATE 35: CPT | Performed by: PSYCHIATRY & NEUROLOGY

## 2025-06-30 PROCEDURE — 250N000013 HC RX MED GY IP 250 OP 250 PS 637: Performed by: PSYCHIATRY & NEUROLOGY

## 2025-06-30 RX ADMIN — TRAZODONE HYDROCHLORIDE 50 MG: 50 TABLET ORAL at 19:28

## 2025-06-30 RX ADMIN — QUETIAPINE FUMARATE 100 MG: 50 TABLET ORAL at 19:28

## 2025-06-30 RX ADMIN — OLANZAPINE 10 MG: 10 TABLET, FILM COATED ORAL at 10:02

## 2025-06-30 RX ADMIN — NICOTINE POLACRILEX 2 MG: 2 GUM, CHEWING ORAL at 17:50

## 2025-06-30 RX ADMIN — PALIPERIDONE 3 MG: 3 TABLET, EXTENDED RELEASE ORAL at 08:31

## 2025-06-30 ASSESSMENT — ACTIVITIES OF DAILY LIVING (ADL)
ADLS_ACUITY_SCORE: 23
ORAL_HYGIENE: INDEPENDENT
ADLS_ACUITY_SCORE: 23
HYGIENE/GROOMING: INDEPENDENT
ADLS_ACUITY_SCORE: 23
ADLS_ACUITY_SCORE: 23
LAUNDRY: UNABLE TO COMPLETE
ADLS_ACUITY_SCORE: 23
DRESS: INDEPENDENT
ADLS_ACUITY_SCORE: 23

## 2025-06-30 NOTE — CARE PLAN
Rehab Group    Start time: 1315  End time: 1400  Patient time total: 45 minutes    attended full group  Not enough pts in group for billing  #1 attended   Group Type: recreation   Group Topic Covered: coping skills and healthy leisure time     Group Session Detail:  Focus of today's group was on healthy leisure exploration and engagement in order to: improve social skills and decrease isolative behaviors, exercise cognitive skills, improve concentration, and encourage new learning and retention.       Patient Response/Contribution:  positive affect, cooperative with task, and organized     Patient Detail:  This was the first time pt has attended group with writer.  Initially 2 other peers present, though they decided to stop playing within the first 5 minutes, though pt remained the entire 45 minutes.  Patient exhibited good attention and focus; able to track the activity successfully.    No delusional statements made, though most conversation ended up being related to the game.      [id number] OT Group (2 or more in attendance)      Patient Active Problem List   Diagnosis    Encopresis    Urinary incontinence    Sprain of medial collateral ligament of knee    Auditory hallucination    Schizophrenia (H)    Suicide attempt (H)    Chronic paranoid schizophrenia (H)    Psychophysiological insomnia    Delusional thoughts (H)    Mood changes

## 2025-06-30 NOTE — PLAN OF CARE
"  Problem: Psychotic Signs/Symptoms  Goal: Improved Psychomotor Symptoms (Psychotic Signs/Symptoms)  Outcome: Not Progressing   Goal Outcome Evaluation:     Pt was visible in the unit. Pt is, avoiding social interaction with peers and staff. Was paranoid and disorganized. Presents with flat and blunted affect. Pt denies SI/SIB/HI. Pt contracted for safety. Denies anxiety and depression. Pt endorsed AH. Pt whispered to this writer stating that \"there are people playing drums into my ears. Its so irritating\". Pt denies VH. Pt denies pain. Pt requested and given PRN Seroquel and trazodone at HS. Ate and drank adequately. No safety concern noted or reported.   "

## 2025-06-30 NOTE — DISCHARGE INSTRUCTIONS
Behavioral Discharge Planning and Instructions    Summary: You were admitted on 6/21/2025  due to Schizophrenia.  You were treated by Jo-Ann Lainez MD and discharged on *** from [facility] to {[facility] D/C Locations:[id number]}    Main Diagnosis: Schizophrenia    Commitment: You were dually committed to the [facility] and the [facility] of Human Services on *** and you are being discharged on a Provisional Discharge Agreement which shall remain in effect for the duration of the Commitment which expires on ***. and Rios: You are also court ordered to take the medications that the doctor ordered for you.     Health Care Follow-up:     PSYCHIATRY:      THERAPY:        PCP:            Patient Navigation Hub:   [facility] work to be your point-of-contact for trustworthy and compassionate care from Inpatient services to [facility] Programmatic Care. We will provide resources and communication to help guide you into programmatic care. Ultimately, our goal is to be the one-stop-shop of communication, coordination, and support for your journey to programmatic care.    Phone: [phone]    Information will be faxed to your outpatient providers to ensure a healthy continuity of care for you.     Attend all scheduled appointments with your outpatient providers. Call at least 24 hours in advance if you need to reschedule an appointment to ensure continued access to your outpatient providers.     Major Treatments, Procedures and Findings:  You were provided with: a psychiatric assessment, assessed for medical stability, medication evaluation and/or management, group therapy, individual therapy, milieu management, and medical interventions    Symptoms to Report: feeling more aggressive, increased confusion, losing more sleep, mood getting worse, or thoughts of suicide, if you feel your meds aren't working.    Early warning signs can include: increased depression or anxiety  sleep disturbances increased thoughts or behaviors of suicide or self-harm  increased unusual thinking, such as paranoia or hearing voices    Safety and Wellness:  Take all medicines as directed.  Make no changes unless your doctor suggests them.      Follow treatment recommendations.  Refrain from alcohol and non-prescribed drugs.  Ask your support system to help you reduce your access to items that could harm yourself or others. If there is a concern for safety, call 911.    Resources:   Mental Health Crisis Resources  Throughout [place]: call **CRISIS (**602078)  Crisis Text Line: is available for free, 24/7 by texting [place] to 533752  Suicide Awareness Voices of Education (SAVE) (www.save.org): 070-133-DBPK (1673)  The National Suicide Prevention Lifeline is now: 988 Suicide and Crisis Lifeline. Call 988 anytime.  National Ashley on Mental Illness (www.[place].narseh.org): 110.916.8412 or 518-129-9425.  Xeys4fado: text the word LIFE to 28020 for immediate support and crisis intervention  Mental Health Consumer/Survivor Network of [place] (www.mhcsn.net): 989.246.1973 or 340-666-1819  Mental Health Association of [place] (www.mentalhealth.org): 738.577.6045 or 310-852-1273  Peer Support Connection [place] Warmline ([facility]) 1-827.980.2760 Available from 5pm - 9am (7 days a week/365 days a year)  Call or Text 364, [facility] 1-119.507.3976, [facility] [phone], [facility] [phone] [facility] [place], [facility] [phone] [facility] [place], [facility] [phone], [facility] [phone], or [facility] [phone] [facility] Crisis Program  {:PHR,SUDAVSRESOURCES}    General Medication Instructions:   See your medication sheet(s) for instructions.   Take all medicines as directed.  Make no changes unless your doctor suggests them.   Go to all your doctor visits.  Be sure to have all your required lab tests. This way, your medicines can be  refilled on time.  Do not use any drugs not prescribed by your doctor.  Avoid alcohol.    Advance Directives:   Scanned document on file with [facility]? No scanned doc  Is document scanned? Pt states no documents  Honoring Choices Your Rights Handout: Informed and given  Was more information offered? Materials given    The Treatment team has appreciated the opportunity to work with you. If you have any questions or concerns about your recent admission, you can contact the unit which can receive your call 24 hours a day, 7 days a week. They will be able to get in touch with a Provider if needed. The unit number is [phone].

## 2025-06-30 NOTE — PROGRESS NOTES
Rehab Group    Start time: 1615  End time: 1715  Patient time total: 45 minutes    attended full group    #4 attended   Group Type: art   Group Topic Covered: activity therapy       Group Session Detail:  Art Therapy directive was to create imagery expressing both positive things in pts life and things they would like to change/let go using hand tracings and imagery and writing.  Goals of directive: emotional expression, emotional regulation, assessing pts motivation for change, media exploration     Patient Response/Contribution:  cooperative with task       Patient Detail:    Pt was a quiet, engaged participant, focused on task for the full duration of group. Pt finished artwork and chose not to verbally process with author or group.   Pts mood was calm, flat/blunted affect.      Activity Therapy Per 15 min ()      Patient Active Problem List   Diagnosis    Encopresis    Urinary incontinence    Sprain of medial collateral ligament of knee    Auditory hallucination    Schizophrenia (H)    Suicide attempt (H)    Chronic paranoid schizophrenia (H)    Psychophysiological insomnia    Delusional thoughts (H)    Mood changes
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
## 2025-06-30 NOTE — CARE PLAN
06/30/25 0300   Observation Type   How often does the Patient require Staff intervention/redirection? occasionally   Harm Category none   No Harm Category Noted at This Time patient sleeping, no interaction   Level of Special Monitoring none

## 2025-06-30 NOTE — PROVIDER NOTIFICATION
06/30/25 0912   Individualization/Patient Specific Goals   Patient Vulnerabilities history of unsuccessful treatment;lacks insight into illness;housing insecurity;family/relationship conflict;adverse childhood experience(s)   Interprofessional Rounds   Participants nursing;CTC;psychiatrist   Behavioral Team Discussion   Participants Dr Migel MD, Ara COVARRUBIAS, Dilcia BARRERA RN Manager, [facility], Annie Allen RN   Progress Per team, pt slept 7 hours. Has been making comments about the leeches, ants in his foot (nothing present), has been present in milieu. Final hearing is not until 7/9/25. Consider IRTS at discharge, and will need DWYER prior to leaving.   Medical/Physical See H&P   Precautions Suicide   Plan Petition for MI commitment and calderón - awaiting final hearing 7/9, consider IRTS with DWYER.   Anticipated Discharge Disposition IRTS     Goal Outcome Evaluation:  PRECAUTIONS AND SAFETY    Behavioral Orders   Procedures    Code 1 - Restrict to Unit    Routine Programming     As clinically indicated    Status 15     Every 15 minutes.    Suicide precautions: Suicide Risk: HIGH; Clinical rationale to override score: modification to the care environment     Patients on Suicide Precautions should have a Combination Diet ordered that includes a Diet selection(s) AND a Behavioral Tray selection for Safe Tray - with utensils, or Safe Tray - NO utensils       Suicide Risk:   HIGH     Clinical rationale to override score::   modification to the care environment       Safety  Safety WDL: WDL  Patient Location: patient room, own  Observed Behavior: calm, sleeping  Observed Behavior (Comment): sitting in the lounge  Safety Measures: safety rounds completed, self-directed behavior promoted, suicide assessment completed, suicide check-in completed  Diversional Activity: television, music  De-Escalation Techniques: quiet time facilitated, stimulation decreased  Suicidality: Status 15, Behavioral scrubs (elham),  Minimal furniture in room, Minimal personal belongings in room
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
## 2025-06-30 NOTE — PLAN OF CARE
Problem: Sleep Disturbance  Goal: Adequate Sleep/Rest  Outcome: Progressing   Goal Outcome Evaluation:[id number]: Pt sleeping at the start of the shift in no apparent distress. Continue on suicide precaution. No SI related behavior noted.Slept all night for 7 hours and currently still sleeping.
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
## 2025-06-30 NOTE — PLAN OF CARE
BEH IP Unit Acuity Rating Score (UARS)  Patient is given one point for every criteria they meet.    CRITERIA SCORING   On a 72 hour hold, court hold, committed, stay of commitment, or revocation. 1    Patient LOS on BEH unit exceeds 20 days. 0  LOS: 9   Patient under guardianship, 55+, otherwise medically complex, or under age 11. 0   Suicide ideation without relief of precipitating factors. 0   Current plan for suicide. 0   Current plan for homicide. 0   Imminent risk or actual attempt to seriously harm another without relief of factors precipitating the attempt. 0   Severe dysfunction in daily living (ex: complete neglect for self care, extreme disruption in vegetative function, extreme deterioration in social interactions). 1   Recent (last 7 days) or current physical aggression in the ED or on unit. 0   Restraints or seclusion episode in past 72 hours. 0   Recent (last 7 days) or current verbal aggression, agitation, yelling, etc., while in the ED or unit. 0   Active psychosis. 1   Need for constant or near constant redirection (from leaving, from others, etc).  0   Intrusive or disruptive behaviors. 0   Patient requires 3 or more hours of individualized nursing care per 8-hour shift (i.e. for ADLs, meds, therapeutic interventions). 0   TOTAL 3

## 2025-07-01 PROCEDURE — 250N000013 HC RX MED GY IP 250 OP 250 PS 637: Performed by: PSYCHIATRY & NEUROLOGY

## 2025-07-01 PROCEDURE — 250N000013 HC RX MED GY IP 250 OP 250 PS 637: Performed by: ANESTHESIOLOGY

## 2025-07-01 PROCEDURE — 124N000002 HC R&B MH UMMC

## 2025-07-01 RX ADMIN — NICOTINE POLACRILEX 2 MG: 2 GUM, CHEWING ORAL at 20:33

## 2025-07-01 RX ADMIN — TRAZODONE HYDROCHLORIDE 50 MG: 50 TABLET ORAL at 20:56

## 2025-07-01 RX ADMIN — NICOTINE POLACRILEX 2 MG: 2 GUM, CHEWING ORAL at 12:56

## 2025-07-01 RX ADMIN — PALIPERIDONE 3 MG: 3 TABLET, EXTENDED RELEASE ORAL at 07:54

## 2025-07-01 RX ADMIN — TRAZODONE HYDROCHLORIDE 50 MG: 50 TABLET ORAL at 19:47

## 2025-07-01 RX ADMIN — NICOTINE POLACRILEX 2 MG: 2 GUM, CHEWING ORAL at 09:34

## 2025-07-01 ASSESSMENT — ACTIVITIES OF DAILY LIVING (ADL)
LAUNDRY: UNABLE TO COMPLETE
ADLS_ACUITY_SCORE: 23
ORAL_HYGIENE: INDEPENDENT
ADLS_ACUITY_SCORE: 23
ADLS_ACUITY_SCORE: 23
HYGIENE/GROOMING: SHOWER;INDEPENDENT
ADLS_ACUITY_SCORE: 23
DRESS: INDEPENDENT;SCRUBS (BEHAVIORAL HEALTH)

## 2025-07-01 NOTE — PLAN OF CARE
Problem: Sleep Disturbance  Goal: Adequate Sleep/Rest  Outcome: Progressing   Goal Outcome Evaluation:9902-4847: Pt sleeping at the start of the shift in no apparent distress. Continue on suicide precaution. No SI related behavior noted.  Slept for 7hrs. Safety maintained

## 2025-07-01 NOTE — CARE PLAN
Pt declined group though he was in the lounge part of the morning while only 1 peer was in group.  Offered him the opportunity to play/finish the game from yesterday that he seemed to enjoy, though he still declined.

## 2025-07-01 NOTE — CARE PLAN
07/01/25 0300   Observation Type   How often does the Patient require Staff intervention/redirection? rarely   Harm Category none   No Harm Category Noted at This Time patient in their room, minimal interaction   Level of Special Monitoring milieu management
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
## 2025-07-01 NOTE — PLAN OF CARE
BEH IP Unit Acuity Rating Score (UARS)  Patient is given one point for every criteria they meet.    CRITERIA SCORING   On a 72 hour hold, court hold, committed, stay of commitment, or revocation. 1    Patient LOS on BEH unit exceeds 20 days. 0  LOS: 10   Patient under guardianship, 55+, otherwise medically complex, or under age 11. 0   Suicide ideation without relief of precipitating factors. 0   Current plan for suicide. 0   Current plan for homicide. 0   Imminent risk or actual attempt to seriously harm another without relief of factors precipitating the attempt. 0   Severe dysfunction in daily living (ex: complete neglect for self care, extreme disruption in vegetative function, extreme deterioration in social interactions). 1   Recent (last 7 days) or current physical aggression in the ED or on unit. 0   Restraints or seclusion episode in past 72 hours. 0   Recent (last 7 days) or current verbal aggression, agitation, yelling, etc., while in the ED or unit. 0   Active psychosis. 1   Need for constant or near constant redirection (from leaving, from others, etc).  0   Intrusive or disruptive behaviors. 0   Patient requires 3 or more hours of individualized nursing care per 8-hour shift (i.e. for ADLs, meds, therapeutic interventions). 0   TOTAL 3
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
## 2025-07-02 PROCEDURE — 250N000013 HC RX MED GY IP 250 OP 250 PS 637: Performed by: PSYCHIATRY & NEUROLOGY

## 2025-07-02 PROCEDURE — 250N000013 HC RX MED GY IP 250 OP 250 PS 637: Performed by: ANESTHESIOLOGY

## 2025-07-02 PROCEDURE — H2032 ACTIVITY THERAPY, PER 15 MIN: HCPCS

## 2025-07-02 PROCEDURE — 99231 SBSQ HOSP IP/OBS SF/LOW 25: CPT | Performed by: PSYCHIATRY & NEUROLOGY

## 2025-07-02 PROCEDURE — 250N000013 HC RX MED GY IP 250 OP 250 PS 637: Performed by: NURSE PRACTITIONER

## 2025-07-02 PROCEDURE — 124N000002 HC R&B MH UMMC

## 2025-07-02 RX ADMIN — OLANZAPINE 10 MG: 10 TABLET, FILM COATED ORAL at 13:27

## 2025-07-02 RX ADMIN — OLANZAPINE 10 MG: 10 TABLET, FILM COATED ORAL at 18:40

## 2025-07-02 RX ADMIN — QUETIAPINE FUMARATE 100 MG: 50 TABLET ORAL at 19:59

## 2025-07-02 RX ADMIN — PALIPERIDONE 3 MG: 3 TABLET, EXTENDED RELEASE ORAL at 08:39

## 2025-07-02 RX ADMIN — TRAZODONE HYDROCHLORIDE 50 MG: 50 TABLET ORAL at 19:59

## 2025-07-02 ASSESSMENT — ACTIVITIES OF DAILY LIVING (ADL)
ADLS_ACUITY_SCORE: 23
DRESS: INDEPENDENT;SCRUBS (BEHAVIORAL HEALTH)
ADLS_ACUITY_SCORE: 23
ORAL_HYGIENE: INDEPENDENT
ADLS_ACUITY_SCORE: 23
LAUNDRY: UNABLE TO COMPLETE
ADLS_ACUITY_SCORE: 23
HYGIENE/GROOMING: INDEPENDENT
ADLS_ACUITY_SCORE: 23
ADLS_ACUITY_SCORE: 23

## 2025-07-02 NOTE — PLAN OF CARE
"  Problem: Psychotic Signs/Symptoms  Goal: Improved Behavioral Control (Psychotic Signs/Symptoms)  Outcome: Progressing  Intervention: Manage Behavior  Recent Flowsheet Documentation  Taken 7/1/2025 1800 by Jass Cortez RN  De-Escalation Techniques:   quiet time facilitated   stimulation decreased   Goal Outcome Evaluation:    Plan of Care Reviewed With: patient      Patient has been calm and pleasant this shift. Patient denies having any thoughts of wanting to harm himself or others, depression and anxiety. Patient does endorse having auditory hallucinations and states \"  I do hear something and they are just telling me stupid things, it's nothing really I just hear them talk about stupid things\".     Patient appears with flat affect, but is calm and cooperative upon approach, Patient was [facility]. Patient came to request for medication for sleep and he was given 50 mg of Trazodone. Patient was requesting for the writer to give 150 mg of Trazodone, Patient was encouraged to come and get another dose of the trazodone of 50 mg if he is not  able to fall asleep. Patient did come back and request for the additional Trazodone 50 mg and was given.    His vitals are stable, and patient didn't have any behavioral concerns at this time.     Blood pressure 109/74, pulse 80, temperature 97.9  F (36.6  C), temperature source Temporal, resp. rate 16, SpO2 98%.                 "

## 2025-07-02 NOTE — PLAN OF CARE
Joo appeared sleeping for 6.25 hours without acute distress   on suicide precaution with absence of self harm this shift  No pain or discomfort endorsed  No reported safety concern this shift       Problem: Adult Inpatient Plan of Care  Goal: Plan of Care Review  Description: The Plan of Care Review/Shift note should be completed every shift.  The Outcome Evaluation is a brief statement about your assessment that the patient is improving, declining, or no change.  This information will be displayed automatically on your shift  note.  Outcome: Progressing  Goal: Absence of Hospital-Acquired Illness or Injury  Intervention: Prevent Skin Injury  Recent Flowsheet Documentation  Taken 7/2/2025 0643 by Lois Hernandez, RN  Body Position:   position changed independently   position maintained   Goal Outcome Evaluation:

## 2025-07-02 NOTE — PLAN OF CARE
BEH IP Unit Acuity Rating Score (UARS)  Patient is given one point for every criteria they meet.    CRITERIA SCORING   On a 72 hour hold, court hold, committed, stay of commitment, or revocation. 1    Patient LOS on BEH unit exceeds 20 days. 0  LOS: 11   Patient under guardianship, 55+, otherwise medically complex, or under age 11. 0   Suicide ideation without relief of precipitating factors. 0   Current plan for suicide. 0   Current plan for homicide. 0   Imminent risk or actual attempt to seriously harm another without relief of factors precipitating the attempt. 0   Severe dysfunction in daily living (ex: complete neglect for self care, extreme disruption in vegetative function, extreme deterioration in social interactions). 1   Recent (last 7 days) or current physical aggression in the ED or on unit. 0   Restraints or seclusion episode in past 72 hours. 0   Recent (last 7 days) or current verbal aggression, agitation, yelling, etc., while in the ED or unit. 0   Active psychosis. 1   Need for constant or near constant redirection (from leaving, from others, etc).  0   Intrusive or disruptive behaviors. 0   Patient requires 3 or more hours of individualized nursing care per 8-hour shift (i.e. for ADLs, meds, therapeutic interventions). 0   TOTAL 3

## 2025-07-02 NOTE — PLAN OF CARE
"Team Note Due:  Monday     Assessment/Intervention/Current Symtoms and Care Coordination:  Chart review, + team.      Per team, pt continues to have delusional thought content. Unable to reality test.   Continues to speak about leeches.     I met with patient, he requested a copy of the prelim hearing order (he had discarded his copy previously) and the RN printed him a new copy. He said \"what does this mean\" and I explained. He stated he just has leeches in him and he's not mentally ill. He was looking around the room and up at the ceilings. I shared that come the final hearing that doesn't mean he will get to discharge on that date, that the doctor will likely want him to start on medications. He stated \"what if this happens again?.. what if the fbi tortures me again\" and I encouraged him to start and stay on medications. I re-referenced his time a couple years ago when he had a really good job and was stable on invega and he looked at me but didn't say anything. I stated sometimes he may think he's feeling really great and then want to stop meds but the best thing to do is keep taking them. Pt stated \"so what are they going to do about my discoloration?\" and pointed to his body. I stated the nurses already assessed this and nothing needs to be done. He stated \"so no antibiotics or nothing?\" I stated it does not appear he needs this per his medical providers. Shortly after I left pt's room he called 911.     Final hearing is 7/9/25.     Discharge Plan or Goal:  TBD - IRTS?   Will need DWYER upon discharge     Barriers to Discharge:  Symptoms     Referral Status:  TBD     Legal Status:  Court Hold  County: [place]  File Number: [id number]   Commitment and Rios Hearing: July 9, 2025 @ 02:30 PM     Petition for MI commitment and Rios submitted 6/23  Rios meds requested are: prolixin, haldol, clozaril, risperdal, zyprexa, invega   PPT: Sindi Montes [phone]    Contacts (include MAURICE status):  Gilbert" Rojelio - Father (No MAURICE) Ph: [phone]      Upcoming Meetings and Dates/Important Information and next steps:  Coordinate care   Commitment process  Update provider about time and date of hearings when known for testifying   Commitment and Rios Hearing: July 9, 2025 @ 02:30 PM    * Pt has meds in discharge pharmacy that he will need to get before discharge *

## 2025-07-02 NOTE — PLAN OF CARE
"Nursing Assessment    Recent Vitals: BP: 119/76 P: 75 O2: 98% T: 96.1    Mental Status Exam:  Appearance:  Poorly groomed  Behavior/relationship to examiner/demeanor:  Guarded  Affect (objective appearance):  Anxious/Nervous  Mood (subjective report):  frustrated and tense  Gait:  Normal  Speech rate, volume, coherence:  Rapid, Soft, Rambling    Thought Process (Associations):  Perseverative  Thought process (Rate):  Normal  Abnormal Perception:  Hallucinations, Illusions,and Paranoia  Attention/Concentration:  Fair,  Alert, Oriented to person, Oriented to place, and Oriented to date/time  Insight:  Poor  Judgment:  Poor    Suicide Assessment:   Recent suicidal thoughts: No   Any attempts in the last 24 hours: No   Plan or considering various methods: No   Access to means: No   Verbal or Written contract for safety: Yes   Self Injurious Behavior: No  Sleep:  Hours of sleep at night: 6.25      General Shift Summary    Patient is in milieu interacting with peers and staff appropriately. Patent denies SI, HI, depression and anxiety but endorses having auditory hallucinations. \"They put something in my tooth that's why I am hearing things.\" \"They are torturing me\" Patient continues to be paranoid about \"the agent.\" No aggressive behaviors present. Patient called 911 during shift. Later asked \"is there a  here.\" Patient requested to see his legal paperwork again. It was given and writer was informed by [facility] he threw the last copy away. Patient is disheveled and patients room is malodorous, ate 100% of meals and is cooperative with medication.    CARMELO Dorsey            "

## 2025-07-02 NOTE — PROGRESS NOTES
[facility], [place]   Psychiatric Progress Note  Hospital Day: 11        Interim History:   The patient's care was discussed with the treatment team during the daily team meeting and/or staff's chart notes were reviewed. Patient did not report any acute medical concerns or side effects other than ongoing concerns that he has bugs and leeches in his body. Patient did not require seclusion or restraints. Patient is exhibiting signs/sx of psychosis or neyda. Patient did not endorse suicidal ideation. Patient did not endorse HI. Patient is not medication adherent. Patient is not attending groups. Patient is sleeping better, slept 6.25 hours overnight. Patient is eating adequately. Patient is attending to ADLs. He is accepting prn neuroleptics for agitation in context of severe psychosis.     Upon interview, Joo continues to exhibit prominent delusional thought content. He remains convinced that he has leeches in his body that were injected into him. Does not respond to reality testing. Does not believe he has a mental illness or is in need of treatment with neuroleptics.     Suicidal ideation: denies current or recent suicidal ideation or behaviors.    Homicidal ideation: denies current or recent homicidal ideation or behaviors.    Psychotic symptoms:  AH, profound delusional and paranoid thought content.     Medication side effects reported: No significant side effects.    Acute medical concerns: yes, as above    Other issues reported by patient: Patient had no further questions or concerns.           Medications:     Current Facility-Administered Medications   Medication Dose Route Frequency Provider Last Rate Last Admin    paliperidone ER (INVEGA) 24 hr tablet 3 mg  3 mg Oral Daily Joselo Bangura MD   3 mg at 07/02/25 0340          Allergies:     Allergies   Allergen Reactions    No Known Drug Allergy           Labs:   No results found for this or any previous visit (from the  past 24 hours).       Psychiatric Examination:     /74 (BP Location: Right arm, Patient Position: Sitting, Cuff Size: Adult Large)   Pulse 80   Temp 97.9  F (36.6  C) (Temporal)   Resp 16   SpO2 98%   Weight is 0 lbs 0 oz  There is no height or weight on file to calculate BMI.    Weight over time:  There were no vitals filed for this visit.    Orthostatic Vitals       None              Cardiometabolic risk assessment. 06/23/25      Reviewed patient profile for cardiometabolic risk factors    Date taken /Value  REFERENCE RANGE   Abdominal Obesity  (Waist Circumference)   See nursing flowsheet Women >=35 in (88 cm)   Men >=40 in (102 cm)      Triglycerides  Triglycerides   Date Value Ref Range Status   06/22/2025 222 (H) <150 mg/dL Final       >=150 mg/dL (1.7 mmol/L) or current treatment for elevated triglycerides   HDL cholesterol  Direct Measure HDL   Date Value Ref Range Status   06/22/2025 27 (L) >=40 mg/dL Final   ]   Women <50 mg/dL (1.3 mmol/L) in women or current treatment for low HDL cholesterol  Men <40 mg/dL (1 mmol/L) in men or current treatment for low HDL cholesterol     Fasting plasma glucose (FPG) Lab Results   Component Value Date     06/20/2025    GLC 80 10/02/2020      FPG >=100 mg/dL (5.6 mmol/L) or treatment for elevated blood glucose   Blood pressure  BP Readings from Last 3 Encounters:   07/01/25 109/74   06/21/25 121/77   03/06/25 (!) 142/86    Blood pressure >=130/85 mmHg or treatment for elevated blood pressure   Family History  See family history     Mental Status Exam:  Appearance: awake, alert and unkempt. No discoloration of skin noted.   Attitude:  evasive, guarded, and somewhat cooperative  Eye Contact:  good  Mood: anxious  Affect:  mood congruent and appears calmer today  Speech:  clear, coherent  Language: fluent and intact in English  Psychomotor, Gait, Musculoskeletal:  no evidence of tardive dyskinesia, dystonia, or tics  Throught Process:  disorganized and  illogical  Associations:  no loose associations  Thought Content:  no evidence of suicidal ideation or homicidal ideation and patient appears to be responding to internal stimuli  Insight:  limited  Judgement:  limited  Oriented to:  time, person, and place  Attention Span and Concentration:  fair  Recent and Remote Memory:  fair  Fund of Knowledge:  low-normal           Precautions:     Behavioral Orders   Procedures    Code 1 - Restrict to Unit    Routine Programming     As clinically indicated    Status 15     Every 15 minutes.    Suicide precautions: Suicide Risk: HIGH; Clinical rationale to override score: modification to the care environment     Patients on Suicide Precautions should have a Combination Diet ordered that includes a Diet selection(s) AND a Behavioral Tray selection for Safe Tray - with utensils, or Safe Tray - NO utensils       Suicide Risk:   HIGH     Clinical rationale to override score::   modification to the care environment          Diagnoses:     Schizophrenia, decompensated    Clinically Significant Risk Factors                                    # Financial/Environmental Concerns:                       Assessment & Plan:     Assessment and hospital summary:  30-year-old male brought to the emergency room by parents, decompensating with delusional statements and auditory hallucinations, noncompliant with meds. Declining Invega today and does not believe he has a mental illness. Family expressing concerns about safety. Will file for MI commitment and Rios on 6/23.     Today's Changes:  None. Plan is to optimize PTA Invega once Rios is in place. Not currently meeting criteria for psychiatric emergency.     Preliminary hearing today and final hearing on 7/9    Target psychiatric symptoms and interventions:  Invega 3 mg daily. Pt may decline. Does not wish to optimize and is currently at the lowest dose.     Risks, benefits, and alternatives discussed at length with patient.     Acute  Medical Problems and Treatments:  Acute medical concerns:  - No acute medical concerns    Pertinent labs/imaging:  Dyslipidemia noted on lipid profile dated 6/22    Behavioral/Psychological/Social:  - Encourage unit programming    Safety:  - Continue precautions as noted above  - Status 15 minute checks    Legal Status: court hold. Final hearing on 7/9.     Disposition Plan   Reason for ongoing admission: is unable to care for self due to severe psychosis or neyda  Discharge location: TBD  Discharge Medications: not ordered  Follow-up Appointments: not scheduled    Entered by: Jo-Ann Lainez MD on 07/02/2025  at 9:33 AM

## 2025-07-03 PROCEDURE — 250N000013 HC RX MED GY IP 250 OP 250 PS 637: Performed by: NURSE PRACTITIONER

## 2025-07-03 PROCEDURE — 250N000013 HC RX MED GY IP 250 OP 250 PS 637: Performed by: ANESTHESIOLOGY

## 2025-07-03 PROCEDURE — 99231 SBSQ HOSP IP/OBS SF/LOW 25: CPT | Performed by: PSYCHIATRY & NEUROLOGY

## 2025-07-03 PROCEDURE — 124N000002 HC R&B MH UMMC

## 2025-07-03 PROCEDURE — 97150 GROUP THERAPEUTIC PROCEDURES: CPT | Mod: GO

## 2025-07-03 PROCEDURE — 250N000013 HC RX MED GY IP 250 OP 250 PS 637: Performed by: PSYCHIATRY & NEUROLOGY

## 2025-07-03 RX ADMIN — PALIPERIDONE 3 MG: 3 TABLET, EXTENDED RELEASE ORAL at 07:50

## 2025-07-03 RX ADMIN — NICOTINE POLACRILEX 2 MG: 2 GUM, CHEWING ORAL at 20:00

## 2025-07-03 RX ADMIN — NICOTINE POLACRILEX 2 MG: 2 GUM, CHEWING ORAL at 14:32

## 2025-07-03 RX ADMIN — NICOTINE POLACRILEX 2 MG: 2 GUM, CHEWING ORAL at 13:17

## 2025-07-03 RX ADMIN — OLANZAPINE 10 MG: 10 TABLET, FILM COATED ORAL at 13:41

## 2025-07-03 RX ADMIN — TRAZODONE HYDROCHLORIDE 50 MG: 50 TABLET ORAL at 19:59

## 2025-07-03 RX ADMIN — QUETIAPINE FUMARATE 50 MG: 50 TABLET ORAL at 19:59

## 2025-07-03 ASSESSMENT — ACTIVITIES OF DAILY LIVING (ADL)
ADLS_ACUITY_SCORE: 23
LAUNDRY: UNABLE TO COMPLETE
ORAL_HYGIENE: INDEPENDENT
ADLS_ACUITY_SCORE: 23
DRESS: INDEPENDENT
ADLS_ACUITY_SCORE: 23
DRESS: SCRUBS (BEHAVIORAL HEALTH);INDEPENDENT
ADLS_ACUITY_SCORE: 23
HYGIENE/GROOMING: INDEPENDENT
ADLS_ACUITY_SCORE: 23
ORAL_HYGIENE: INDEPENDENT
ADLS_ACUITY_SCORE: 23
HYGIENE/GROOMING: INDEPENDENT
ADLS_ACUITY_SCORE: 23
LAUNDRY: UNABLE TO COMPLETE

## 2025-07-03 NOTE — PROGRESS NOTES
[facility], [place]   Psychiatric Progress Note  Hospital Day: 12        Interim History:   The patient's care was discussed with the treatment team during the daily team meeting and/or staff's chart notes were reviewed. Patient did not report any acute medical concerns or side effects other than ongoing concerns. Appears to be improving. Patient did not require seclusion or restraints. Patient is exhibiting signs/sx of psychosis or neyda. Patient did not endorse suicidal ideation. Patient did not endorse HI. Patient is not medication adherent. Patient is not attending groups. Patient is sleeping better, slept 6.25 hours overnight. Patient is eating adequately. Patient is attending to ADLs. He is accepting prn neuroleptics for agitation in context of severe psychosis.     Upon interview, Joo reports feeling better and wonders if the leeches have left his body. He still is very paranoid and guarded. Convinced that FBI is working against him. Again does not feel he needs medications and does not believe he has a mental illness.     Suicidal ideation: denies current or recent suicidal ideation or behaviors.    Homicidal ideation: denies current or recent homicidal ideation or behaviors.    Psychotic symptoms:  AH, profound delusional and paranoid thought content.     Medication side effects reported: No significant side effects.    Acute medical concerns: yes, as above    Other issues reported by patient: Patient had no further questions or concerns.           Medications:     Current Facility-Administered Medications   Medication Dose Route Frequency Provider Last Rate Last Admin    paliperidone ER (INVEGA) 24 hr tablet 3 mg  3 mg Oral Daily Joselo Bangura MD   3 mg at 07/03/25 0750          Allergies:     Allergies   Allergen Reactions    No Known Drug Allergy           Labs:   No results found for this or any previous visit (from the past 24 hours).       Psychiatric  Examination:     /82 (BP Location: Left arm)   Pulse 77   Temp 97.4  F (36.3  C) (Oral)   Resp 16   SpO2 98%   Weight is 0 lbs 0 oz  There is no height or weight on file to calculate BMI.    Weight over time:  There were no vitals filed for this visit.    Orthostatic Vitals       None              Cardiometabolic risk assessment. 06/23/25      Reviewed patient profile for cardiometabolic risk factors    Date taken /Value  REFERENCE RANGE   Abdominal Obesity  (Waist Circumference)   See nursing flowsheet Women >=35 in (88 cm)   Men >=40 in (102 cm)      Triglycerides  Triglycerides   Date Value Ref Range Status   06/22/2025 222 (H) <150 mg/dL Final       >=150 mg/dL (1.7 mmol/L) or current treatment for elevated triglycerides   HDL cholesterol  Direct Measure HDL   Date Value Ref Range Status   06/22/2025 27 (L) >=40 mg/dL Final   ]   Women <50 mg/dL (1.3 mmol/L) in women or current treatment for low HDL cholesterol  Men <40 mg/dL (1 mmol/L) in men or current treatment for low HDL cholesterol     Fasting plasma glucose (FPG) Lab Results   Component Value Date     06/20/2025    GLC 80 10/02/2020      FPG >=100 mg/dL (5.6 mmol/L) or treatment for elevated blood glucose   Blood pressure  BP Readings from Last 3 Encounters:   07/03/25 133/82   06/21/25 121/77   03/06/25 (!) 142/86    Blood pressure >=130/85 mmHg or treatment for elevated blood pressure   Family History  See family history     Mental Status Exam:  Appearance: awake, alert and unkempt.  Attitude:  evasive, guarded, and somewhat cooperative  Eye Contact:  good  Mood: anxious  Affect:  mood congruent and appears calmer today  Speech:  clear, coherent  Language: fluent and intact in English  Psychomotor, Gait, Musculoskeletal:  no evidence of tardive dyskinesia, dystonia, or tics  Throught Process:  disorganized and illogical  Associations:  no loose associations  Thought Content:  no evidence of suicidal ideation or homicidal ideation and  patient appears to be responding to internal stimuli  Insight:  limited  Judgement:  limited  Oriented to:  time, person, and place  Attention Span and Concentration:  fair  Recent and Remote Memory:  fair  Fund of Knowledge:  low-normal           Precautions:     Behavioral Orders   Procedures    Code 1 - Restrict to Unit    Routine Programming     As clinically indicated    Status 15     Every 15 minutes.    Suicide precautions: Suicide Risk: HIGH; Clinical rationale to override score: modification to the care environment     Patients on Suicide Precautions should have a Combination Diet ordered that includes a Diet selection(s) AND a Behavioral Tray selection for Safe Tray - with utensils, or Safe Tray - NO utensils       Suicide Risk:   HIGH     Clinical rationale to override score::   modification to the care environment          Diagnoses:     Schizophrenia, decompensated    Clinically Significant Risk Factors                                    # Financial/Environmental Concerns:                       Assessment & Plan:     Assessment and hospital summary:  30-year-old male brought to the emergency room by parents, decompensating with delusional statements and auditory hallucinations, noncompliant with meds. Declining Invega today and does not believe he has a mental illness. Family expressing concerns about safety. Will file for MI commitment and Rios on 6/23.     Today's Changes:  None. Plan is to optimize PTA Invega once Rios is in place. Not currently meeting criteria for psychiatric emergency.     Preliminary hearing today and final hearing on 7/9    Target psychiatric symptoms and interventions:  Invega 3 mg daily. Pt may decline. Does not wish to optimize and is currently at the lowest dose.     Risks, benefits, and alternatives discussed at length with patient.     Acute Medical Problems and Treatments:  Acute medical concerns:  - No acute medical concerns    Pertinent labs/imaging:  Dyslipidemia  noted on lipid profile dated 6/22    Behavioral/Psychological/Social:  - Encourage unit programming    Safety:  - Continue precautions as noted above  - Status 15 minute checks    Legal Status: court hold. Final hearing on 7/9.     Disposition Plan   Reason for ongoing admission: is unable to care for self due to severe psychosis or neyda  Discharge location: TBD  Discharge Medications: not ordered  Follow-up Appointments: not scheduled    Entered by: Jo-Ann Lainez MD on 07/03/2025  at 1:43 PM

## 2025-07-03 NOTE — CARE PLAN
07/02/25 1900   Observation Type   How often does the Patient require Staff intervention/redirection? unpredictably   Harm Category none   No Harm Category Noted at This Time patient in their room, minimal interaction   Level of Special Monitoring milieu management

## 2025-07-03 NOTE — PLAN OF CARE
Joo appeared sleeping  for 6.75 hours. Respirations are even and unlabored.  15 minutes safety checks completed throughout the night and no issues observed  No discomfort or pain verbalized   Pt is calm and behaviorally controlled this shift.       Problem: Adult Behavioral Health Plan of Care  Goal: Plan of Care Review  Outcome: Progressing  Goal: Adheres to Safety Considerations for Self and Others  Intervention: Develop and Maintain Individualized Safety Plan  Recent Flowsheet Documentation  Taken 7/3/2025 [id number] by Lois Hernandez RN  Safety Measures: safety rounds completed   Goal Outcome Evaluation:

## 2025-07-03 NOTE — PLAN OF CARE
Problem: Psychotic Signs/Symptoms  Goal: Improved Behavioral Control (Psychotic Signs/Symptoms)  Outcome: Progressing   Goal Outcome Evaluation: Pt out in the milieu/lounge area watching TV majority of the shift. No engagement with peers noted. On approach, patient is cooperative with assessment questions  although presents with flat and blunted affect. Pt denies SI/SIB/HI, consented for safety .Denies depression, endorses anxiety, requested and received PRN Seroquel and trazodone at HS. Ate and drank adequately. No safety concerns noted or reported. Safety maintained per unit protocol.

## 2025-07-03 NOTE — PLAN OF CARE
BEH IP Unit Acuity Rating Score (UARS)  Patient is given one point for every criteria they meet.    CRITERIA SCORING   On a 72 hour hold, court hold, committed, stay of commitment, or revocation. 1    Patient LOS on BEH unit exceeds 20 days. 0  LOS: 12   Patient under guardianship, 55+, otherwise medically complex, or under age 11. 0   Suicide ideation without relief of precipitating factors. 0   Current plan for suicide. 0   Current plan for homicide. 0   Imminent risk or actual attempt to seriously harm another without relief of factors precipitating the attempt. 0   Severe dysfunction in daily living (ex: complete neglect for self care, extreme disruption in vegetative function, extreme deterioration in social interactions). 1   Recent (last 7 days) or current physical aggression in the ED or on unit. 0   Restraints or seclusion episode in past 72 hours. 0   Recent (last 7 days) or current verbal aggression, agitation, yelling, etc., while in the ED or unit. 0   Active psychosis. 1   Need for constant or near constant redirection (from leaving, from others, etc).  0   Intrusive or disruptive behaviors. 0   Patient requires 3 or more hours of individualized nursing care per 8-hour shift (i.e. for ADLs, meds, therapeutic interventions). 0   TOTAL 3

## 2025-07-03 NOTE — PLAN OF CARE
"Nursing Assessment    Recent Vitals: B/P: 133/82, T: 97.4, P: 77, R: 16     Mental Status Exam:  Appearance:  Adequately groomed  Behavior/relationship to examiner/demeanor:  Cooperative and Guarded  Affect (objective appearance):  Blunted/Flat and Anxious/Nervous  Mood (subjective report):  \"fine\"  Gait:  Normal  Speech rate, volume, coherence:  Rapid, Normal, Rambling    Thought Process (Associations):  Tangential  Thought process (Rate):  Normal  Abnormal Perception:  Hallucinations and Illusions  Attention/Concentration:  Fair,  Alert, Oriented to person, Oriented to place, and Oriented to date/time  Insight:  Poor  Judgment:  Poor    Suicide Assessment:   Recent suicidal thoughts: No   Any attempts in the last 24 hours: No   Plan or considering various methods: No   Access to means: No   Verbal or Written contract for safety: Yes   Self Injurious Behavior: No  Sleep:  Hours of sleep at night: 6.75      General Shift Summary    Patient is in milieu and interacts appropriately with peers and staff. Responded \"fine\" after asking how he was doing. Patient denies depression anxiety, SI, and HI. Continues to endorse auditory hallucinations. Patient mumbles under breath, and appears to be responding to internal stimuli. Patient asked writer to look into chart to see if there was a note from last time when he had a bug removed. Writer informed patient they could not find a note relating to that event. Patient continued to state somatic complaints \"the bugs are crawling around my throat. I can feel them when I'm laying down. I don't know if the agent put them there or the FBI, but they're crawling all over my throat\" Patient made exaggerated swallowing motion with his throat and leaned forward in an attempt to remove the bugs. Writer offered patient PRN Zyprexa and the patient agreed to take it with minimal encouragement. Patient is cooperative with medication, ate 100% of meals and showered.       Cihuacoatlzin " CARMELO Betts

## 2025-07-03 NOTE — PROGRESS NOTES
Rehab Group    Start time: 1615  End time: 1700  Patient time total: 20 minutes    attended partial group     #3 attended   Group Type: recreation   Group Topic Covered: activity therapy, healthy leisure time, and social skills       Group Session Detail:  TR leisure group     Patient Response/Contribution:  cooperative with task, attentive, and actively engaged       Patient Detail:    Pt attended some of the structured Therapeutic Recreation group, participating in a group activity. Pt participated in a leisure activity with social engagement to gain self-esteem, manage behaviors, improve social skills, decrease isolation, and reduce anxiety/depression.   Pt entered group late, but remained focused and engaged in the group activity for approximately 20 minutes before deciding to leave group. Pt was an active participant, contributing to the clues and descriptions throughout the activity. During pt's turns, he seemed to have difficulty coming up with guesses, needing extra clues/assistance to help him guess.      Activity Therapy Per 15 min ()      Patient Active Problem List   Diagnosis    Encopresis    Urinary incontinence    Sprain of medial collateral ligament of knee    Auditory hallucination    Schizophrenia (H)    Suicide attempt (H)    Chronic paranoid schizophrenia (H)    Psychophysiological insomnia    Delusional thoughts (H)    Mood changes

## 2025-07-03 NOTE — PLAN OF CARE
Team Note Due:  Monday     Assessment/Intervention/Current Symtoms and Care Coordination:  Chart review, + team.      Per team, pt continues to have delusional thought content. Unable to reality test.   Continues to speak about leeches. No agitation or behavioral issues.    Final hearing is 7/9/25.     Discharge Plan or Goal:  TBD - IRTS?   Will need DWYER upon discharge     Barriers to Discharge:  Symptoms     Referral Status:  TBD     Legal Status:  Court Hold  County: [facility]  File Number: [id number]   Commitment and Rios Hearing: July 9, 2025 @ 02:30 PM     Petition for MI commitment and Rios submitted 6/23  Rios meds requested are: prolixin, haldol, clozaril, risperdal, zyprexa, invega   PPT: Sindi Montes [phone]    Contacts (include MAURICE status):  Gilbert Serrato - Father (No MAURICE) Ph: [phone]      Upcoming Meetings and Dates/Important Information and next steps:  Coordinate care   Commitment process  Update provider about time and date of hearings when known for testifying   Commitment and Rios Hearing: July 9, 2025 @ 02:30 PM    * Pt has meds in discharge pharmacy that he will need to get before discharge *       Patient

## 2025-07-04 PROCEDURE — 250N000013 HC RX MED GY IP 250 OP 250 PS 637: Performed by: ANESTHESIOLOGY

## 2025-07-04 PROCEDURE — 250N000013 HC RX MED GY IP 250 OP 250 PS 637: Performed by: PSYCHIATRY & NEUROLOGY

## 2025-07-04 PROCEDURE — 250N000013 HC RX MED GY IP 250 OP 250 PS 637: Performed by: NURSE PRACTITIONER

## 2025-07-04 PROCEDURE — 124N000002 HC R&B MH UMMC

## 2025-07-04 RX ADMIN — NICOTINE POLACRILEX 4 MG: 2 GUM, CHEWING ORAL at 13:48

## 2025-07-04 RX ADMIN — NICOTINE POLACRILEX 4 MG: 2 GUM, CHEWING ORAL at 18:13

## 2025-07-04 RX ADMIN — PALIPERIDONE 3 MG: 3 TABLET, EXTENDED RELEASE ORAL at 08:48

## 2025-07-04 RX ADMIN — TRAZODONE HYDROCHLORIDE 50 MG: 50 TABLET ORAL at 20:38

## 2025-07-04 RX ADMIN — QUETIAPINE FUMARATE 100 MG: 50 TABLET ORAL at 20:38

## 2025-07-04 RX ADMIN — NICOTINE POLACRILEX 4 MG: 2 GUM, CHEWING ORAL at 08:48

## 2025-07-04 ASSESSMENT — ACTIVITIES OF DAILY LIVING (ADL)
ADLS_ACUITY_SCORE: 23
HYGIENE/GROOMING: INDEPENDENT
ADLS_ACUITY_SCORE: 23
LAUNDRY: UNABLE TO COMPLETE
ADLS_ACUITY_SCORE: 23
ADLS_ACUITY_SCORE: 23
ORAL_HYGIENE: INDEPENDENT
ADLS_ACUITY_SCORE: 23
DRESS: INDEPENDENT
ADLS_ACUITY_SCORE: 23

## 2025-07-04 NOTE — PLAN OF CARE
"  Problem: Adult Inpatient Plan of Care  Goal: Optimal Comfort and Wellbeing  Outcome: Progressing   Goal Outcome Evaluation:       Patient has some bizzare behaviors early in the morning. He appeared anxious and stood on the chairs in lounge 1. He was redirected by the PA but resumed the behavior. He then went into his room. He reports that he is bored much of the day. He did not participate in groups. He does engage with staff and peers but the conversations are minimal. He denies any current mental health concerns. Denies AH/VH. No depression/SI/SIB. He is able to make his needs known. He contracts for safety.   In the afternoon he approached this RN and stated that there was a collado in his throat and he needed to see a doctor immediately to get an xray. He stated \"this has happed to me before, I can feel it in there moving around up and down.\" He indicated that it was an emergency and that he was, \"freaking out.\" Patient was given reassurance that there was no leaches on the unit or in his throat. He continues to insist on seeing a medical doctor.                    "

## 2025-07-04 NOTE — CARE PLAN
Rehab Group    Start time: 1900  End time: 2000  Patient time total: 30 minutes    attended partial group    #3 attended   Group Type: recreation   Group Topic Covered: activity therapy and problem solving     Group Session Detail:  Leisure group     Patient Response/Contribution:    positive affect, cooperative with task, organized, socially appropriate, attentive, and actively engaged       Patient Detail:  Patient remained an engaged, attentive, and appropriate group attendee. Patient listened actively to the instructions of task and had an excellent return demonstration of understanding. Patient exhibited good attention and focus; able to track the activity successfully.         [id number] OT Group (2 or more in attendance)        Patient Active Problem List   Diagnosis    Encopresis    Urinary incontinence    Sprain of medial collateral ligament of knee    Auditory hallucination    Schizophrenia (H)    Suicide attempt (H)    Chronic paranoid schizophrenia (H)    Psychophysiological insomnia    Delusional thoughts (H)    Mood changes

## 2025-07-04 NOTE — PLAN OF CARE
Problem: Psychotic Signs/Symptoms  Goal: Improved Mood Symptoms (Psychotic Signs/Symptoms)  Outcome: Progressing  Intervention: Optimize Emotion and Mood   Goal Outcome Evaluation:    Plan of Care Reviewed With: patient      Pt slept past diner time. He woke up and ate his diner in the lounge. Pt watched TV and attended evening group activities. Pt denies pain and psych symptoms. Pt reported that he feels bugs in his throat. Pt has good appetite. Pt was calm and cooperative with nursing cares. Pt requested and received prn of Trazodone and Seroquel. Pt denies any physical and behavioral concerns throughout the shift.

## 2025-07-05 PROCEDURE — 250N000013 HC RX MED GY IP 250 OP 250 PS 637: Performed by: PSYCHIATRY & NEUROLOGY

## 2025-07-05 PROCEDURE — 124N000002 HC R&B MH UMMC

## 2025-07-05 PROCEDURE — 250N000013 HC RX MED GY IP 250 OP 250 PS 637: Performed by: NURSE PRACTITIONER

## 2025-07-05 PROCEDURE — 250N000013 HC RX MED GY IP 250 OP 250 PS 637: Performed by: ANESTHESIOLOGY

## 2025-07-05 RX ADMIN — NICOTINE POLACRILEX 4 MG: 2 GUM, CHEWING ORAL at 08:23

## 2025-07-05 RX ADMIN — NICOTINE POLACRILEX 4 MG: 2 GUM, CHEWING ORAL at 18:08

## 2025-07-05 RX ADMIN — OLANZAPINE 10 MG: 10 TABLET, FILM COATED ORAL at 12:57

## 2025-07-05 RX ADMIN — NICOTINE POLACRILEX 4 MG: 2 GUM, CHEWING ORAL at 12:57

## 2025-07-05 RX ADMIN — PALIPERIDONE 3 MG: 3 TABLET, EXTENDED RELEASE ORAL at 08:23

## 2025-07-05 RX ADMIN — QUETIAPINE FUMARATE 50 MG: 50 TABLET ORAL at 20:11

## 2025-07-05 RX ADMIN — TRAZODONE HYDROCHLORIDE 50 MG: 50 TABLET ORAL at 20:11

## 2025-07-05 ASSESSMENT — ACTIVITIES OF DAILY LIVING (ADL)
ADLS_ACUITY_SCORE: 23
HYGIENE/GROOMING: INDEPENDENT
ADLS_ACUITY_SCORE: 23
ORAL_HYGIENE: INDEPENDENT
ADLS_ACUITY_SCORE: 23
ADLS_ACUITY_SCORE: 23
DRESS: INDEPENDENT
ADLS_ACUITY_SCORE: 23

## 2025-07-05 NOTE — PLAN OF CARE
"  Calm, cooperative. Visible in milieu. No insight into mental health. Sates that the FBI is targeting him. That he does not have a psychiatric condition, that instead he has \"leeches\" and that is why he is in the hospital. Anxiety 8/10. Depression 1/10. Denies SI/HI, A/VH. No acute behavioral concerns identified this shift.     States that he \"feels like something is coming up\" and connects this with \"leeches\" in his body. States that it is frustrating that he is in psychiatric setting for a \"physical issue.\" When assessed for physical issue, pt stated that at night, when he lies flat and is sleeping, he will experience difficulty breathing and connected this with the leeches. No adverse effects of medication observed or reported.       Last 24H PRN:     nicotine (NICORETTE) gum 2-4 mg, 4 mg at 07/05/25 1808    OLANZapine (zyPREXA) tablet 10 mg, 10 mg at 07/05/25 1257 **OR** OLANZapine (zyPREXA) injection 10 mg    QUEtiapine (SEROquel) tablet  mg, 50 mg at 07/05/25 2011    traZODone (DESYREL) tablet 50 mg, 50 mg at 07/05/25 2011     /82 (BP Location: Left arm, Patient Position: Sitting, Cuff Size: Adult Regular)   Pulse 71   Temp 97.2  F (36.2  C) (Temporal)   Resp 17   SpO2 98%               "

## 2025-07-05 NOTE — PLAN OF CARE
Problem: Adult Behavioral Health Plan of Care  Goal: Adheres to Safety Considerations for Self and Others  Outcome: Progressing   Goal Outcome Evaluation:       Patient has been in the lounge intermittently. He has been noted to be returning to internal stimuli. He did request a PRN zyprexa for voices. He is extremely unkept and malodorous. He was encouraged to take a shower but declined. He is able to contract for safety on the unit. He has been medication compliant. Denies AI/SIB/plan or intent. No acute physical concerns noted.
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
## 2025-07-06 PROCEDURE — 250N000013 HC RX MED GY IP 250 OP 250 PS 637: Performed by: ANESTHESIOLOGY

## 2025-07-06 PROCEDURE — 250N000013 HC RX MED GY IP 250 OP 250 PS 637: Performed by: NURSE PRACTITIONER

## 2025-07-06 PROCEDURE — 124N000002 HC R&B MH UMMC

## 2025-07-06 PROCEDURE — 250N000013 HC RX MED GY IP 250 OP 250 PS 637: Performed by: PSYCHIATRY & NEUROLOGY

## 2025-07-06 RX ADMIN — NICOTINE POLACRILEX 4 MG: 2 GUM, CHEWING ORAL at 16:30

## 2025-07-06 RX ADMIN — PALIPERIDONE 3 MG: 3 TABLET, EXTENDED RELEASE ORAL at 08:44

## 2025-07-06 RX ADMIN — QUETIAPINE FUMARATE 50 MG: 50 TABLET ORAL at 20:02

## 2025-07-06 RX ADMIN — TRAZODONE HYDROCHLORIDE 50 MG: 50 TABLET ORAL at 20:02

## 2025-07-06 ASSESSMENT — ACTIVITIES OF DAILY LIVING (ADL)
ADLS_ACUITY_SCORE: 23
LAUNDRY: UNABLE TO COMPLETE
ORAL_HYGIENE: INDEPENDENT
ADLS_ACUITY_SCORE: 23
DRESS: INDEPENDENT;SCRUBS (BEHAVIORAL HEALTH)
ADLS_ACUITY_SCORE: 23
HYGIENE/GROOMING: INDEPENDENT
ADLS_ACUITY_SCORE: 23
ADLS_ACUITY_SCORE: 23
DRESS: SCRUBS (BEHAVIORAL HEALTH);INDEPENDENT
ADLS_ACUITY_SCORE: 23
ORAL_HYGIENE: INDEPENDENT
ADLS_ACUITY_SCORE: 23
HYGIENE/GROOMING: INDEPENDENT
ADLS_ACUITY_SCORE: 23
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
## 2025-07-06 NOTE — PLAN OF CARE
NOC Shift Report    SLEPT:  7 hours overnight.      Safety rounds completed with no issues identified.    PAIN:  No c/o pain or discomfort.    PRNs:  NONE.    PRECAUTIONS:  SUICIDE.    Goal Outcome Evaluation:  Problem: Sleep Disturbance  Goal: Adequate Sleep/Rest  Outcome: Progressing      This patient has been assessed with a concern for Malnutrition and was treated during this hospitalization for the following Nutrition diagnosis/diagnoses:     -  11/04/2022: Severe protein-calorie malnutrition

## 2025-07-07 PROCEDURE — 99232 SBSQ HOSP IP/OBS MODERATE 35: CPT | Performed by: PSYCHIATRY & NEUROLOGY

## 2025-07-07 PROCEDURE — 250N000013 HC RX MED GY IP 250 OP 250 PS 637: Performed by: NURSE PRACTITIONER

## 2025-07-07 PROCEDURE — 124N000002 HC R&B MH UMMC

## 2025-07-07 PROCEDURE — 250N000013 HC RX MED GY IP 250 OP 250 PS 637: Performed by: PSYCHIATRY & NEUROLOGY

## 2025-07-07 PROCEDURE — 250N000013 HC RX MED GY IP 250 OP 250 PS 637: Performed by: ANESTHESIOLOGY

## 2025-07-07 RX ADMIN — PALIPERIDONE 3 MG: 3 TABLET, EXTENDED RELEASE ORAL at 09:10

## 2025-07-07 RX ADMIN — NICOTINE POLACRILEX 2 MG: 2 GUM, CHEWING ORAL at 12:53

## 2025-07-07 RX ADMIN — QUETIAPINE FUMARATE 50 MG: 50 TABLET ORAL at 20:17

## 2025-07-07 RX ADMIN — TRAZODONE HYDROCHLORIDE 50 MG: 50 TABLET ORAL at 20:17

## 2025-07-07 ASSESSMENT — ACTIVITIES OF DAILY LIVING (ADL)
ORAL_HYGIENE: INDEPENDENT
ADLS_ACUITY_SCORE: 23
HYGIENE/GROOMING: INDEPENDENT
ADLS_ACUITY_SCORE: 23
ADLS_ACUITY_SCORE: 23
DRESS: INDEPENDENT
ADLS_ACUITY_SCORE: 23
LAUNDRY: UNABLE TO COMPLETE
ADLS_ACUITY_SCORE: 23

## 2025-07-07 NOTE — PLAN OF CARE
Problem: Psychotic Signs/Symptoms  Goal: Improved Behavioral Control (Psychotic Signs/Symptoms)  Outcome: Progressing  Patient spent most of this shift in his room watching tv. His affect is flat and mood is calm. He is compliant with vitals and medication administration. He did not complain of pain and prn nicotine gum was given. He did not make any statement about the FBI this shift and he did not complain of bugs being in his body. Patient ate 100% of breakfast and lunch without issues. He did not attend group and he was not social with peers. Patient did not take a shower this shift.    /80 (BP Location: Right arm, Patient Position: Sitting, Cuff Size: Adult Large)   Pulse 80   Temp 98.4  F (36.9  C) (Tympanic)   Resp 16   SpO2 97%     02-May-2022

## 2025-07-07 NOTE — PLAN OF CARE
BEH IP Unit Acuity Rating Score (UARS)  Patient is given one point for every criteria they meet.    CRITERIA SCORING   On a 72 hour hold, court hold, committed, stay of commitment, or revocation. 1    Patient LOS on BEH unit exceeds 20 days. 0  LOS: 16   Patient under guardianship, 55+, otherwise medically complex, or under age 11. 0   Suicide ideation without relief of precipitating factors. 0   Current plan for suicide. 0   Current plan for homicide. 0   Imminent risk or actual attempt to seriously harm another without relief of factors precipitating the attempt. 0   Severe dysfunction in daily living (ex: complete neglect for self care, extreme disruption in vegetative function, extreme deterioration in social interactions). 1   Recent (last 7 days) or current physical aggression in the ED or on unit. 0   Restraints or seclusion episode in past 72 hours. 0   Recent (last 7 days) or current verbal aggression, agitation, yelling, etc., while in the ED or unit. 0   Active psychosis. 1   Need for constant or near constant redirection (from leaving, from others, etc).  0   Intrusive or disruptive behaviors. 0   Patient requires 3 or more hours of individualized nursing care per 8-hour shift (i.e. for ADLs, meds, therapeutic interventions). 0   TOTAL 3

## 2025-07-07 NOTE — PLAN OF CARE
NOC Shift Report    SLEPT:  7 hours overnight.      Safety rounds completed with no issues identified.    PAIN:  No c/o pain or discomfort.    PRNs:  NONE.    PRECAUTIONS:  SUICIDE    Goal Outcome Evaluation:  Problem: Sleep Disturbance  Goal: Adequate Sleep/Rest  Outcome: Progressing

## 2025-07-07 NOTE — PLAN OF CARE
"Calm, cooperative. Visible in milieu, not observed socializing. Continues to express delusional content, e.g., that the FBI is tracking him, that he has leeches inside his body. No acute behavioral or safety concern this evening.     Denied pain, denies having any acute physical concerns at this time. No adverse effects of medication observed or reported.     General appearance: Mildly unkempt  Affect: Blunted   Mood: \"ok\"  Speech: normal volume, rate, tone  Thought Process: linear   Thought Content: somatic, persecutory delusions   Perception: Denies hallucinations   Orientation: oriented x 3, not oriented to situation   Insight: Limited   Judgement: Limited   Psychomotor: No abnormal movements noted.   Suicidal / Homicidal Thoughts: Denies        /81 (BP Location: Right arm)   Pulse 82   Temp 97.2  F (36.2  C) (Temporal)   Resp 14   SpO2 99%     "
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
## 2025-07-07 NOTE — PLAN OF CARE
Calm, cooperative. Continues to express delusions, e.g., that the FBI is tracking him, that he has leeches in his body. Good eye contact. Speech is linear, but delusional. Visible in milieu. Denied SI/HI, A/VH, anxiety, depression. No scheduled medication given. No acute behavioral or safety concerns noted.     No complaints of pain, no acute physical concerns noted. No adverse effects of medication noted.     General appearance: Unkempt   Affect: Somewhat blunted  Mood: neutral  Speech: WDL volume, rate, tone  Thought Process: mildly loose association   Thought Content: somatic, persecutory   Perception: Denies hallucinations  Orientation: x 3  Insight: limited    Judgement: impaired  Memory: intact   Attention: intact  Psychomotor: no abnormalities noted  Suicidal / Homicidal Thoughts: Denies    /77 (BP Location: Right arm, Patient Position: Sitting, Cuff Size: Adult Large)   Pulse 81   Temp 98.2  F (36.8  C) (Temporal)   Resp 18   SpO2 98%
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
## 2025-07-08 PROCEDURE — 250N000013 HC RX MED GY IP 250 OP 250 PS 637: Performed by: ANESTHESIOLOGY

## 2025-07-08 PROCEDURE — 250N000013 HC RX MED GY IP 250 OP 250 PS 637: Performed by: PSYCHIATRY & NEUROLOGY

## 2025-07-08 PROCEDURE — 250N000013 HC RX MED GY IP 250 OP 250 PS 637: Performed by: NURSE PRACTITIONER

## 2025-07-08 PROCEDURE — 124N000002 HC R&B MH UMMC

## 2025-07-08 RX ADMIN — TRAZODONE HYDROCHLORIDE 50 MG: 50 TABLET ORAL at 20:07

## 2025-07-08 RX ADMIN — PALIPERIDONE 3 MG: 3 TABLET, EXTENDED RELEASE ORAL at 08:51

## 2025-07-08 RX ADMIN — QUETIAPINE FUMARATE 100 MG: 50 TABLET ORAL at 20:07

## 2025-07-08 RX ADMIN — NICOTINE POLACRILEX 4 MG: 2 GUM, CHEWING ORAL at 17:29

## 2025-07-08 ASSESSMENT — ACTIVITIES OF DAILY LIVING (ADL)
ADLS_ACUITY_SCORE: 23
ORAL_HYGIENE: INDEPENDENT
LAUNDRY: UNABLE TO COMPLETE
ADLS_ACUITY_SCORE: 23
HYGIENE/GROOMING: INDEPENDENT
ADLS_ACUITY_SCORE: 23
ORAL_HYGIENE: INDEPENDENT
ADLS_ACUITY_SCORE: 23
DRESS: INDEPENDENT
ADLS_ACUITY_SCORE: 23
DRESS: SCRUBS (BEHAVIORAL HEALTH);INDEPENDENT
ADLS_ACUITY_SCORE: 23
LAUNDRY: UNABLE TO COMPLETE
ADLS_ACUITY_SCORE: 23
HYGIENE/GROOMING: INDEPENDENT

## 2025-07-08 NOTE — PROGRESS NOTES
[facility], [place]   Psychiatric Progress Note  Hospital Day: 16        Interim History:   The patient's care was discussed with the treatment team during the daily team meeting and/or staff's chart notes were reviewed. Remains isolative to room and still expressing concerns about bugs in his body and the FBI monitoring him. Patient did not require seclusion or restraints. Patient is exhibiting signs/sx of psychosis or neyda. Patient did not endorse suicidal ideation. Patient did not endorse HI. Patient is not medication adherent. Patient is not attending groups. Patient is sleeping better. Patient is eating adequately. Patient is attending to ADLs. He is accepting prn neuroleptics for agitation in context of severe psychosis.     Upon interview, Joo continues to express concerns about the FBI. Still believes his concerns about bugs in his body are reality based and is not responding to reality testing. He is aware that final hearing is on 7/9 and that my ongoing recommendation is to optimize Invega, but he does not feel it is indicated.      Suicidal ideation: denies current or recent suicidal ideation or behaviors.    Homicidal ideation: denies current or recent homicidal ideation or behaviors.    Psychotic symptoms:  AH, profound delusional and paranoid thought content.     Medication side effects reported: No significant side effects.    Acute medical concerns: yes, as above    Other issues reported by patient: Patient had no further questions or concerns.           Medications:     Current Facility-Administered Medications   Medication Dose Route Frequency Provider Last Rate Last Admin    paliperidone ER (INVEGA) 24 hr tablet 3 mg  3 mg Oral Daily Joselo Bangura MD   3 mg at 07/07/25 0910          Allergies:     Allergies   Allergen Reactions    No Known Drug Allergy           Labs:   No results found for this or any previous visit (from the past 24 hours).        Psychiatric Examination:     /80 (BP Location: Right arm, Patient Position: Sitting, Cuff Size: Adult Large)   Pulse 80   Temp 98.4  F (36.9  C) (Tympanic)   Resp 16   SpO2 97%   Weight is 0 lbs 0 oz  There is no height or weight on file to calculate BMI.    Weight over time:  There were no vitals filed for this visit.    Orthostatic Vitals       None              Cardiometabolic risk assessment. 06/23/25      Reviewed patient profile for cardiometabolic risk factors    Date taken /Value  REFERENCE RANGE   Abdominal Obesity  (Waist Circumference)   See nursing flowsheet Women >=35 in (88 cm)   Men >=40 in (102 cm)      Triglycerides  Triglycerides   Date Value Ref Range Status   06/22/2025 222 (H) <150 mg/dL Final       >=150 mg/dL (1.7 mmol/L) or current treatment for elevated triglycerides   HDL cholesterol  Direct Measure HDL   Date Value Ref Range Status   06/22/2025 27 (L) >=40 mg/dL Final   ]   Women <50 mg/dL (1.3 mmol/L) in women or current treatment for low HDL cholesterol  Men <40 mg/dL (1 mmol/L) in men or current treatment for low HDL cholesterol     Fasting plasma glucose (FPG) Lab Results   Component Value Date     06/20/2025    GLC 80 10/02/2020      FPG >=100 mg/dL (5.6 mmol/L) or treatment for elevated blood glucose   Blood pressure  BP Readings from Last 3 Encounters:   07/07/25 115/80   06/21/25 121/77   03/06/25 (!) 142/86    Blood pressure >=130/85 mmHg or treatment for elevated blood pressure   Family History  See family history     Mental Status Exam:  Appearance: awake, alert and unkempt.  Attitude:  evasive, guarded, and somewhat cooperative  Eye Contact:  good  Mood: anxious  Affect:  mood congruent and appears calmer today  Speech:  clear, coherent  Language: fluent and intact in English  Psychomotor, Gait, Musculoskeletal:  no evidence of tardive dyskinesia, dystonia, or tics  Throught Process:  disorganized and illogical  Associations:  no loose associations  Thought  Content:  no evidence of suicidal ideation or homicidal ideation and patient appears to be responding to internal stimuli  Insight:  limited  Judgement:  limited  Oriented to:  time, person, and place  Attention Span and Concentration:  fair  Recent and Remote Memory:  fair  Fund of Knowledge:  low-normal           Precautions:     Behavioral Orders   Procedures    Code 1 - Restrict to Unit    Routine Programming     As clinically indicated    Status 15     Every 15 minutes.    Suicide precautions: Suicide Risk: HIGH; Clinical rationale to override score: modification to the care environment     Patients on Suicide Precautions should have a Combination Diet ordered that includes a Diet selection(s) AND a Behavioral Tray selection for Safe Tray - with utensils, or Safe Tray - NO utensils       Suicide Risk:   HIGH     Clinical rationale to override score::   modification to the care environment          Diagnoses:     Schizophrenia, decompensated    Clinically Significant Risk Factors                                    # Financial/Environmental Concerns:                       Assessment & Plan:     Assessment and hospital summary:  30-year-old male brought to the emergency room by parents, decompensating with delusional statements and auditory hallucinations, noncompliant with meds. Declining Invega today and does not believe he has a mental illness. Family expressing concerns about safety. Will file for MI commitment and Rios on 6/23.     Today's Changes:  None. Plan is to optimize PTA Invega once Rios is in place. Not currently meeting criteria for psychiatric emergency.     Final hearing on 7/9    Target psychiatric symptoms and interventions:  Invega 3 mg daily. Pt may decline. Does not wish to optimize and is currently at the lowest dose.     Risks, benefits, and alternatives discussed at length with patient.     Acute Medical Problems and Treatments:  Acute medical concerns:  - No acute medical  concerns    Pertinent labs/imaging:  Dyslipidemia noted on lipid profile dated 6/22    Behavioral/Psychological/Social:  - Encourage unit programming    Safety:  - Continue precautions as noted above  - Status 15 minute checks    Legal Status: court hold. Final hearing on 7/9.     Disposition Plan   Reason for ongoing admission: is unable to care for self due to severe psychosis or neyda  Discharge location: TBD  Discharge Medications: not ordered  Follow-up Appointments: not scheduled    Entered by: Jo-Ann Lainez MD on 07/07/2025  at 7:05 PM
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
## 2025-07-08 NOTE — PLAN OF CARE
BEH IP Unit Acuity Rating Score (UARS)  Patient is given one point for every criteria they meet.    CRITERIA SCORING   On a 72 hour hold, court hold, committed, stay of commitment, or revocation. 1    Patient LOS on BEH unit exceeds 20 days. 0  LOS: 17   Patient under guardianship, 55+, otherwise medically complex, or under age 11. 0   Suicide ideation without relief of precipitating factors. 0   Current plan for suicide. 0   Current plan for homicide. 0   Imminent risk or actual attempt to seriously harm another without relief of factors precipitating the attempt. 0   Severe dysfunction in daily living (ex: complete neglect for self care, extreme disruption in vegetative function, extreme deterioration in social interactions). 1   Recent (last 7 days) or current physical aggression in the ED or on unit. 0   Restraints or seclusion episode in past 72 hours. 0   Recent (last 7 days) or current verbal aggression, agitation, yelling, etc., while in the ED or unit. 0   Active psychosis. 1   Need for constant or near constant redirection (from leaving, from others, etc).  0   Intrusive or disruptive behaviors. 0   Patient requires 3 or more hours of individualized nursing care per 8-hour shift (i.e. for ADLs, meds, therapeutic interventions). 0   TOTAL 3

## 2025-07-09 PROCEDURE — 124N000002 HC R&B MH UMMC

## 2025-07-09 PROCEDURE — 250N000013 HC RX MED GY IP 250 OP 250 PS 637: Performed by: PSYCHIATRY & NEUROLOGY

## 2025-07-09 PROCEDURE — 250N000013 HC RX MED GY IP 250 OP 250 PS 637: Performed by: ANESTHESIOLOGY

## 2025-07-09 PROCEDURE — 99232 SBSQ HOSP IP/OBS MODERATE 35: CPT | Performed by: PSYCHIATRY & NEUROLOGY

## 2025-07-09 RX ADMIN — TRAZODONE HYDROCHLORIDE 50 MG: 50 TABLET ORAL at 21:38

## 2025-07-09 RX ADMIN — NICOTINE POLACRILEX 4 MG: 2 GUM, CHEWING ORAL at 16:14

## 2025-07-09 RX ADMIN — NICOTINE POLACRILEX 4 MG: 2 GUM, CHEWING ORAL at 17:56

## 2025-07-09 RX ADMIN — PALIPERIDONE 3 MG: 3 TABLET, EXTENDED RELEASE ORAL at 09:06

## 2025-07-09 RX ADMIN — OLANZAPINE 10 MG: 10 TABLET, FILM COATED ORAL at 20:15

## 2025-07-09 RX ADMIN — NICOTINE POLACRILEX 4 MG: 2 GUM, CHEWING ORAL at 09:56

## 2025-07-09 RX ADMIN — TRAZODONE HYDROCHLORIDE 50 MG: 50 TABLET ORAL at 20:12

## 2025-07-09 ASSESSMENT — ACTIVITIES OF DAILY LIVING (ADL)
ADLS_ACUITY_SCORE: 23
DRESS: INDEPENDENT
HYGIENE/GROOMING: INDEPENDENT
ADLS_ACUITY_SCORE: 23
LAUNDRY: UNABLE TO COMPLETE
ADLS_ACUITY_SCORE: 23
ADLS_ACUITY_SCORE: 23
HYGIENE/GROOMING: INDEPENDENT
ADLS_ACUITY_SCORE: 23
ORAL_HYGIENE: INDEPENDENT
ADLS_ACUITY_SCORE: 23

## 2025-07-09 NOTE — PLAN OF CARE
Team Note Due:  Monday     Assessment/Intervention/Current Symtoms and Care Coordination:  Chart review, + team.      Per team, pt has been calm and pleasant, took a shower this morning. He takes invega 3mg but nothing higher which he needs. Final hearing today.     Pt was informed about the process many times but has difficulty tracking. He is asking if he can be discharged today. Commitment and Rios Hearing: July 9, 2025 @ 02:30 PM    Pt was asking many questions and asked to speak to SW. When I went to speak to pt, he avoided eye contact. I asked what questions he had and he stated he didn't have any. He did not want to talk only to confirm time of court. I shared we will set up the laptop for him.     Discharge Plan or Goal:  TBD - IRTS?   Will need DWYER upon discharge     Barriers to Discharge:  Symptoms     Referral Status:  TBD     Legal Status:  Court Hold  County: [facility]  File Number: [id number]   Commitment and Rios Hearing: July 9, 2025 @ 02:30 PM     Petition for MI commitment and Rios submitted 6/23  Rios meds requested are: prolixin, haldol, clozaril, risperdal, zyprexa, invega   PPT: Sindi Montes [phone]    Contacts (include MAURICE status):  Gilbert Serrato - Father (No MAURICE) Ph: [phone]      Upcoming Meetings and Dates/Important Information and next steps:  Coordinate care   Commitment process  Update provider about time and date of hearings when known for testifying   Commitment and Rios Hearing: July 9, 2025 @ 02:30 PM    * Pt has meds in discharge pharmacy that he will need to get before discharge *
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
## 2025-07-09 NOTE — PLAN OF CARE
NOC Shift Report    SLEPT:  7 hours overnight.      Safety rounds completed with no issues identified.    PAIN:  No c/o pain or discomfort.    PRNs:  NONE.    PRECAUTIONS:  SUICIDE.    Pt is scheduled for court at 1430 today.    Goal Outcome Evaluation:  Problem: Sleep Disturbance  Goal: Adequate Sleep/Rest  Outcome: Progressing
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
## 2025-07-09 NOTE — CARE PLAN
07/09/25 1534   Observation Type   How often does the Patient require Staff intervention/redirection? rarely   Harm Category none   No Harm Category Noted at This Time no applicable harm categories or justifications   Level of Special Monitoring none

## 2025-07-09 NOTE — PLAN OF CARE
BEH IP Unit Acuity Rating Score (UARS)  Patient is given one point for every criteria they meet.    CRITERIA SCORING   On a 72 hour hold, court hold, committed, stay of commitment, or revocation. 1    Patient LOS on BEH unit exceeds 20 days. 0  LOS: 18   Patient under guardianship, 55+, otherwise medically complex, or under age 11. 0   Suicide ideation without relief of precipitating factors. 0   Current plan for suicide. 0   Current plan for homicide. 0   Imminent risk or actual attempt to seriously harm another without relief of factors precipitating the attempt. 0   Severe dysfunction in daily living (ex: complete neglect for self care, extreme disruption in vegetative function, extreme deterioration in social interactions). 1   Recent (last 7 days) or current physical aggression in the ED or on unit. 0   Restraints or seclusion episode in past 72 hours. 0   Recent (last 7 days) or current verbal aggression, agitation, yelling, etc., while in the ED or unit. 0   Active psychosis. 1   Need for constant or near constant redirection (from leaving, from others, etc).  0   Intrusive or disruptive behaviors. 0   Patient requires 3 or more hours of individualized nursing care per 8-hour shift (i.e. for ADLs, meds, therapeutic interventions). 0   TOTAL 3

## 2025-07-09 NOTE — PLAN OF CARE
"\"I didn't see my doctor today. I want to ask her how I'm doing.\"  \"What if they try to rape me again?\"...\"The FBI.\"  \"I know there are still leeches in my body.\"  \"I don't need to be here, you know. Am I leaving after court tomorrow?\"  \"I want to move out of my parents house and I want to get that going.\"  \"I'm not sick, I don't need to be here. I've been patient for a month already.\"    Good eye contact but distracted at times. Off and on tense affect congruent with stated thought content.  Unchanged: voices delusions about the presence of leeches in his body as well as paranoid delusions about the FBI monitoring and pursuing him with the goal of raping him.  Continues to respond  to inner stimuli by talking to himself at times.  Denies the need and desire for hopitalisation and medication.  Voices frustration about his length of stay.    Requested and receive Seroquel 100 mg and Trazodone 50 mg at 20:07.  Some socializing while playing cards.     Court date 7/9, 14:30.     Requests to speak to SW 7/9/25.     Appearance: clean in food stained clothing  Body Language:cooperative  Attitude:cooperative  Mood:neutral to defensive  Affect:blunted  Thought Process:linear  Thought content:some delusional, paranoid content  Perceptions:altered by delusions  Suicidal/homicidal:denies/denies  Knowledge,Insight,Judgement:intact/impaired/impaired  Attention/Concentration:fair/fair  Memory:Recent-intact                Remote-intact  Speech:WNL volume. Rambling at times  Cognition:Oriented x3. Not oriented to circumstance  Psychomotor: No abnormal body movements  Sleep/Activity level:  Asleep after 21:00  Motivation: For discharge only     Plan: Monitor and document mood and behaviour, thought process and content. Establish and maintain therapeutic relationship. Educate about diagnoses, medications, treatment, legal status, plan of care. Address preexisting and concurrent medical concerns.      P:disturbed thought " process  G:rational thought process  O:not progressing

## 2025-07-09 NOTE — PLAN OF CARE
"  Problem: Adult Behavioral Health Plan of Care  Goal: Adheres to Safety Considerations for Self and Others  Outcome: Progressing     Problem: Suicide Risk  Goal: Absence of Self-Harm  Outcome: Progressing   Goal Outcome Evaluation:       Patient has been calm and cooperative, medication compliant, present in the milieu. Patient expressed frustration how people think he is lying about a leech having been put in his body. Patient asked writer to review his chart to find a clinic visit from 10 years ago at [facility] where are states a leech was inserted inside of him. Patient states \"you know how when someone keys your car a certain way and you know its them? Well I know someone injected a leech into me because I have a blue [place] on my skin\" (pointed to his lower abdomen)  Pt stated \"I am not schizophrenic, I need to get released\", \"I'm not a danger to myself or others, I'm not hallucinating\". Patient stated that the FBI implanted a speaker into his tooth and then uses that to send messages to him. He states he hears the FBI talking to him via the tooth speaker and reports that he has been hearing messages today. He states the FBI has just been reiterating that they are surveiling him. Patient frequently pointed at the ceiling when talking to writer stating that the FBI works with the criminals that injected him in [facility]. Patient was tangential and speech a little bit disorganized.                      "

## 2025-07-10 PROCEDURE — 250N000013 HC RX MED GY IP 250 OP 250 PS 637: Performed by: PSYCHIATRY & NEUROLOGY

## 2025-07-10 PROCEDURE — 250N000013 HC RX MED GY IP 250 OP 250 PS 637: Performed by: ANESTHESIOLOGY

## 2025-07-10 PROCEDURE — 99232 SBSQ HOSP IP/OBS MODERATE 35: CPT | Performed by: PSYCHIATRY & NEUROLOGY

## 2025-07-10 PROCEDURE — 124N000002 HC R&B MH UMMC

## 2025-07-10 RX ADMIN — PALIPERIDONE 3 MG: 3 TABLET, EXTENDED RELEASE ORAL at 08:35

## 2025-07-10 RX ADMIN — NICOTINE POLACRILEX 2 MG: 2 GUM, CHEWING ORAL at 10:38

## 2025-07-10 RX ADMIN — TRAZODONE HYDROCHLORIDE 50 MG: 50 TABLET ORAL at 21:56

## 2025-07-10 RX ADMIN — NICOTINE POLACRILEX 4 MG: 2 GUM, CHEWING ORAL at 13:28

## 2025-07-10 RX ADMIN — TRAZODONE HYDROCHLORIDE 50 MG: 50 TABLET ORAL at 20:55

## 2025-07-10 RX ADMIN — NICOTINE POLACRILEX 4 MG: 2 GUM, CHEWING ORAL at 19:19

## 2025-07-10 ASSESSMENT — ACTIVITIES OF DAILY LIVING (ADL)
ADLS_ACUITY_SCORE: 23
ADLS_ACUITY_SCORE: 23
DRESS: INDEPENDENT
HYGIENE/GROOMING: INDEPENDENT
ADLS_ACUITY_SCORE: 23
ORAL_HYGIENE: INDEPENDENT
ADLS_ACUITY_SCORE: 23
ADLS_ACUITY_SCORE: 23
DRESS: SCRUBS (BEHAVIORAL HEALTH);INDEPENDENT
ADLS_ACUITY_SCORE: 23
ADLS_ACUITY_SCORE: 23
LAUNDRY: UNABLE TO COMPLETE
ADLS_ACUITY_SCORE: 23
ORAL_HYGIENE: INDEPENDENT
ADLS_ACUITY_SCORE: 23
HYGIENE/GROOMING: INDEPENDENT
ADLS_ACUITY_SCORE: 23

## 2025-07-10 NOTE — PLAN OF CARE
"Calm, cooperative. Alert, oriented x 3. Good eye contact. Visible in milieu. Continues to voice somatic delusion about leeches, bugs, and spider being in his body. Making reference to a \"man in the speaker\" whom he states if targeting him. Denied anxiety, depression, SI/HI, A/VH.     Denies pain. No adverse effects of medication observed or reported. PRN trazodone for sleep x2.     General appearance: alert,  Affect: blunted  Mood: neutral  Speech: at time quick speech   Thought Process: linear   Thought Content: somatic, persecutory delusions  Perception: denies hallucinations  Orientation: disoriented to situation   Insight: limited  Judgement: limited  Suicidal / Homicidal Thoughts: Denies      /82 (BP Location: Right arm, Patient Position: Sitting, Cuff Size: Adult Large)   Pulse 69   Temp 97.8  F (36.6  C) (Temporal)   Resp 16   SpO2 100%     "

## 2025-07-10 NOTE — PLAN OF CARE
Joo  appeared sleeping for 7 hours without acute distress   15 minutes safety checks completed and no issues identified  Continue  on SI precaution with absence of self harm this shift  No pain or discomfort noted or verbalized  No outburst or other inappropriate behavior this shift         Problem: Adult Behavioral Health Plan of Care  Goal: Plan of Care Review  Outcome: Progressing  Goal: Adheres to Safety Considerations for Self and Others  Intervention: Develop and Maintain Individualized Safety Plan  Recent Flowsheet Documentation  Taken 7/10/2025 0231 by Lois Hernandez, RN  Safety Measures: safety rounds completed   Goal Outcome Evaluation:

## 2025-07-10 NOTE — PROGRESS NOTES
"[facility], [place]   Psychiatric Progress Note  Hospital Day: 19        Interim History:   The patient's care was discussed with the treatment team during the daily team meeting and/or staff's chart notes were reviewed.   Per RN note dated 7/9:  \"I hear him from the speakers up there (pointing to ceiling) and in my tooth, just waiting to drug me again.  They're touching me and yelling at me and making me do shit. Shut up, motherfucker (glaring at the ceiling).\"  \"They made me pay rent for 4 different places. Pay and pay. I paid about 08540 dollars because of them.\"  \"All the crimes I've ever committed they made me do. They inject me with shit. The police is supposed to get the EMS if they see someone being injected, aren't they?\"  \"I want to get away from this shit. I want to go to [place] and get away from them.\"  \"I just can't handle being touched any more. And the lasers.\"  \"They let me say what I wanted to say so I guess it went good in court. Do you have the fax with the order yet so I can go?\"     Loud pressured speech with frequent use of profanities directed at his internal stimuli. Eye contact is WNL to intense with frequent eye movements toward the ceiling there he believed speakers to be. Continues to express frustration about his hospitalisation and is adamant about not wanting or needing psychotropic medications and not being mentally ill.  Appeared to experience some relief by voicing his thoughts and feelings. Declined the offer of diversionary activities and prn medication.    Denied thoughts of harming anyone.     Requested and received Trazone 50 mg and Zyprexa 10 mg at 20:15 after being informed that his requested Seroquel had been discontinued. Requested repeat Trazodone c/o not having been able to go to sleep with the mediction he had already received.    Patient did not require seclusion or restraints. Patient is exhibiting signs/sx of psychosis or neyda. Patient " "did not endorse suicidal ideation. Patient did not endorse HI. Patient is medication adherent, but only accepting low dose Invega and is not interested/willing to increase dose as recommended. Denies need for medication or hospitalization. Patient is not attending groups. Patient is sleeping better. Patient is eating adequately. Patient is attending to ADLs. He is accepting prn neuroleptics for agitation in context of severe psychosis.     Upon interview, Joo still expresses concerns about being drugged. Mentioned that he was at [employer] prior to admission and was certain that the food there was \"drugged.\" Stated that he was experiencing both VH and AH following ingestion of food there. He is not certain who is drugging him but replied \"lots of people.\" Not sure why they are targeting him. He was pleased to hear that he does not need to remain in the hospital for the duration of the commitment. He is in agreement with plan to pursue IRTS once Invega is further optimized per Rios order. He is hoping to get back to work within the next three months, if possible.     Suicidal ideation: denies current or recent suicidal ideation or behaviors.    Homicidal ideation: denies current or recent homicidal ideation or behaviors.    Psychotic symptoms:  AH, profound delusional and paranoid thought content.     Medication side effects reported: No significant side effects.    Acute medical concerns: yes, as above    Other issues reported by patient: Patient had no further questions or concerns.           Medications:     Current Facility-Administered Medications   Medication Dose Route Frequency Provider Last Rate Last Admin    paliperidone ER (INVEGA) 24 hr tablet 3 mg  3 mg Oral Daily Joselo Bangura MD   3 mg at 07/10/25 7158          Allergies:     Allergies   Allergen Reactions    No Known Drug Allergy           Labs:   No results found for this or any previous visit (from the past 24 hours).       Psychiatric " Examination:     /74   Pulse 62   Temp 97.5  F (36.4  C) (Temporal)   Resp 16   SpO2 97%   Weight is 0 lbs 0 oz  There is no height or weight on file to calculate BMI.    Weight over time:  There were no vitals filed for this visit.    Orthostatic Vitals       None              Cardiometabolic risk assessment. 06/23/25      Reviewed patient profile for cardiometabolic risk factors    Date taken /Value  REFERENCE RANGE   Abdominal Obesity  (Waist Circumference)   See nursing flowsheet Women >=35 in (88 cm)   Men >=40 in (102 cm)      Triglycerides  Triglycerides   Date Value Ref Range Status   06/22/2025 222 (H) <150 mg/dL Final       >=150 mg/dL (1.7 mmol/L) or current treatment for elevated triglycerides   HDL cholesterol  Direct Measure HDL   Date Value Ref Range Status   06/22/2025 27 (L) >=40 mg/dL Final   ]   Women <50 mg/dL (1.3 mmol/L) in women or current treatment for low HDL cholesterol  Men <40 mg/dL (1 mmol/L) in men or current treatment for low HDL cholesterol     Fasting plasma glucose (FPG) Lab Results   Component Value Date     06/20/2025    GLC 80 10/02/2020      FPG >=100 mg/dL (5.6 mmol/L) or treatment for elevated blood glucose   Blood pressure  BP Readings from Last 3 Encounters:   07/10/25 112/74   06/21/25 121/77   03/06/25 (!) 142/86    Blood pressure >=130/85 mmHg or treatment for elevated blood pressure   Family History  See family history     Mental Status Exam:  Appearance: awake, alert and unkempt.  Attitude:  evasive, guarded, and somewhat cooperative  Eye Contact:  good  Mood: anxious  Affect:  mood congruent   Speech:  clear, coherent  Language: fluent and intact in English  Psychomotor, Gait, Musculoskeletal:  no evidence of tardive dyskinesia, dystonia, or tics  Throught Process:  disorganized and illogical  Associations:  no loose associations  Thought Content:  no evidence of suicidal ideation or homicidal ideation and patient appears to be responding to internal  stimuli  Insight:  limited  Judgement:  limited  Oriented to:  time, person, and place  Attention Span and Concentration:  fair  Recent and Remote Memory:  fair  Fund of Knowledge:  low-normal           Precautions:     Behavioral Orders   Procedures    Code 1 - Restrict to Unit    Routine Programming     As clinically indicated    Status 15     Every 15 minutes.    Suicide precautions: Suicide Risk: HIGH; Clinical rationale to override score: modification to the care environment     Patients on Suicide Precautions should have a Combination Diet ordered that includes a Diet selection(s) AND a Behavioral Tray selection for Safe Tray - with utensils, or Safe Tray - NO utensils       Suicide Risk:   HIGH     Clinical rationale to override score::   modification to the care environment          Diagnoses:     Schizophrenia, decompensated    Clinically Significant Risk Factors                                    # Financial/Environmental Concerns:                       Assessment & Plan:     Assessment and hospital summary:  30-year-old male brought to the emergency room by parents, decompensating with delusional statements and auditory hallucinations, noncompliant with meds. Declining Invega today and does not believe he has a mental illness. Family expressing concerns about safety. Will file for MI commitment and Rios on 6/23.     Today's Changes:  None. Plan is to optimize PTA Invega once Rios is in place. Not currently meeting criteria for psychiatric emergency.     Target psychiatric symptoms and interventions:  Invega 3 mg daily. Pt may decline. Does not wish to optimize and is currently at the lowest dose.     Risks, benefits, and alternatives discussed at length with patient.     Acute Medical Problems and Treatments:  Acute medical concerns:  - No acute medical concerns    Pertinent labs/imaging:  Dyslipidemia noted on lipid profile dated 6/22    Behavioral/Psychological/Social:  - Encourage unit  programming    Safety:  - Continue precautions as noted above  - Status 15 minute checks    Legal Status: court hold. Final hearing was on 7/9. Awaiting outcome.     Disposition Plan   Reason for ongoing admission: is unable to care for self due to severe psychosis or neyda  Discharge location: TBD  Discharge Medications: not ordered  Follow-up Appointments: not scheduled    Entered by: Jo-Ann Lainez MD on 07/10/2025  at 9:43 AM

## 2025-07-10 NOTE — PLAN OF CARE
BEH IP Unit Acuity Rating Score (UARS)  Patient is given one point for every criteria they meet.    CRITERIA SCORING   On a 72 hour hold, court hold, committed, stay of commitment, or revocation. 1    Patient LOS on BEH unit exceeds 20 days. 0  LOS: 19   Patient under guardianship, 55+, otherwise medically complex, or under age 11. 0   Suicide ideation without relief of precipitating factors. 0   Current plan for suicide. 0   Current plan for homicide. 0   Imminent risk or actual attempt to seriously harm another without relief of factors precipitating the attempt. 0   Severe dysfunction in daily living (ex: complete neglect for self care, extreme disruption in vegetative function, extreme deterioration in social interactions). 1   Recent (last 7 days) or current physical aggression in the ED or on unit. 0   Restraints or seclusion episode in past 72 hours. 0   Recent (last 7 days) or current verbal aggression, agitation, yelling, etc., while in the ED or unit. 0   Active psychosis. 1   Need for constant or near constant redirection (from leaving, from others, etc).  0   Intrusive or disruptive behaviors. 0   Patient requires 3 or more hours of individualized nursing care per 8-hour shift (i.e. for ADLs, meds, therapeutic interventions). 0   TOTAL 3

## 2025-07-10 NOTE — PLAN OF CARE
"Nursing Assessment    Recent Vitals: B/P: 112/74, T: 97.5, P: 62, R: 16     Mental Status Exam:  Appearance:  Disheveled   Behavior/relationship to examiner/demeanor:  Guarded  Affect (objective appearance):  Restricted  Mood (subjective report):  \"good\"  Gait:  Normal  Speech rate, volume, coherence:  Normal, Normal, Rambling    Thought Process (Associations):  Tangential, Paranoid Rambling  Thought process (Rate):  Normal  Abnormal Perception:  Hallucinations  Attention/Concentration:  Fair,  Alert, Oriented to person, Oriented to place, and Oriented to date/time  Insight:  Absent  Judgment:  Poor    Suicide Assessment:   Recent suicidal thoughts: No   Any attempts in the last 24 hours: No   Plan or considering various methods: No   Access to means: No   Verbal or Written contract for safety: Yes   Self Injurious Behavior: No  Sleep:  Hours of sleep at night: 7      General Shift Summary  Withdraws to room, interacts with patients and staff appropriately. Denies depression, anxiety and AVH. Repeatedly asks about commitment paperwork, and requests to speak to the . Speech is pressured and patient appears tense. When asked how he was doing he replied \"good.\"  Cooperative with medication and eats 100% of meals, no aggressive behaviors noted. Patient has discoloration on arms, was assessed, looks like possibly discoloration from cold. Patient denies itchiness or discomfort, \" It's from the leeches\"       CARMELO Dorsey    "

## 2025-07-10 NOTE — PLAN OF CARE
"Team Note Due:  Monday     Assessment/Intervention/Current Symtoms and Care Coordination:  Chart review, + team.      Pt continues with delusional thought content. Pt had final hearing yesterday and we are awaiting the MI commitment with  rios order from [facility].     Pt mostly isolative to room today as two other peers got in a conflict and the lounge was closed briefly. I greeted pt and he stated a short answer and avoided eye contact.     I met with pt to check in and he was aware the recommendation is IRTS. I asked if he had any preference on location and he stated \"[place] cities, they [place] find me [place] here\" unsure of who he was referring to. I did inform pt that prior to referring to IRTS we will want to see him attend groups and he stated \"I know how to attend groups, I'll be fine\". Pt appears unkempt. I shared it sounds like the provider will want to make some med changes prior to him going to IRTS so once he is ready we can submit those referrals. I shared I would print him some information about these.     I met with pt again and printed out some information about IRTS. Pt mentioned leeches in his body and throat and chest.     Discharge Plan or Goal:  TBD - IRTS  Will need DWYER upon discharge     Barriers to Discharge:  Symptoms     Referral Status:  TBD     Legal Status:  Court Hold  County: [facility]  File Number: [id number]   Commitment and Rios Hearing: July 9, 2025 @ 02:30 PM     Petition for MI commitment and Rios submitted 6/23  Rios meds requested are: prolixin, haldol, clozaril, risperdal, zyprexa, invega   PPT: Sindi Montes [phone]    Contacts (include MAURICE status):  Gilbert Serrato - Father (No MAURICE) Ph: [phone]      Upcoming Meetings and Dates/Important Information and next steps:  Coordinate care   Commitment process  Update provider about time and date of hearings when known for testifying   Commitment and Rios Hearing: July 9, 2025 @ 02:30 PM    * Pt " has meds in discharge pharmacy that he will need to get before discharge *

## 2025-07-11 LAB
ALBUMIN SERPL BCG-MCNC: 4.1 G/DL (ref 3.5–5.2)
ALP SERPL-CCNC: 66 U/L (ref 40–150)
ALT SERPL W P-5'-P-CCNC: 22 U/L (ref 0–70)
ANION GAP SERPL CALCULATED.3IONS-SCNC: 10 MMOL/L (ref 7–15)
AST SERPL W P-5'-P-CCNC: 25 U/L (ref 0–45)
BASOPHILS # BLD AUTO: 0.1 10E3/UL (ref 0–0.2)
BASOPHILS NFR BLD AUTO: 1 %
BILIRUB SERPL-MCNC: 0.3 MG/DL
BUN SERPL-MCNC: 9.9 MG/DL (ref 6–20)
CALCIUM SERPL-MCNC: 9 MG/DL (ref 8.8–10.4)
CHLORIDE SERPL-SCNC: 102 MMOL/L (ref 98–107)
CREAT SERPL-MCNC: 0.83 MG/DL (ref 0.67–1.17)
EGFRCR SERPLBLD CKD-EPI 2021: >90 ML/MIN/1.73M2
EOSINOPHIL # BLD AUTO: 0.3 10E3/UL (ref 0–0.7)
EOSINOPHIL NFR BLD AUTO: 3 %
ERYTHROCYTE [DISTWIDTH] IN BLOOD BY AUTOMATED COUNT: 12.4 % (ref 10–15)
GLUCOSE SERPL-MCNC: 96 MG/DL (ref 70–99)
HCO3 SERPL-SCNC: 23 MMOL/L (ref 22–29)
HCT VFR BLD AUTO: 45.2 % (ref 40–53)
HGB BLD-MCNC: 15.6 G/DL (ref 13.3–17.7)
IMM GRANULOCYTES # BLD: 0 10E3/UL
IMM GRANULOCYTES NFR BLD: 0 %
LYMPHOCYTES # BLD AUTO: 2.7 10E3/UL (ref 0.8–5.3)
LYMPHOCYTES NFR BLD AUTO: 30 %
MCH RBC QN AUTO: 31.5 PG (ref 26.5–33)
MCHC RBC AUTO-ENTMCNC: 34.5 G/DL (ref 31.5–36.5)
MCV RBC AUTO: 91 FL (ref 78–100)
MONOCYTES # BLD AUTO: 0.9 10E3/UL (ref 0–1.3)
MONOCYTES NFR BLD AUTO: 10 %
NEUTROPHILS # BLD AUTO: 5 10E3/UL (ref 1.6–8.3)
NEUTROPHILS NFR BLD AUTO: 55 %
NRBC # BLD AUTO: 0 10E3/UL
NRBC BLD AUTO-RTO: 0 /100
PLATELET # BLD AUTO: 250 10E3/UL (ref 150–450)
POTASSIUM SERPL-SCNC: 4.2 MMOL/L (ref 3.4–5.3)
PROT SERPL-MCNC: 7.1 G/DL (ref 6.4–8.3)
RBC # BLD AUTO: 4.96 10E6/UL (ref 4.4–5.9)
SODIUM SERPL-SCNC: 135 MMOL/L (ref 135–145)
WBC # BLD AUTO: 9 10E3/UL (ref 4–11)

## 2025-07-11 PROCEDURE — 36415 COLL VENOUS BLD VENIPUNCTURE: CPT | Performed by: PSYCHIATRY & NEUROLOGY

## 2025-07-11 PROCEDURE — 250N000013 HC RX MED GY IP 250 OP 250 PS 637: Performed by: PSYCHIATRY & NEUROLOGY

## 2025-07-11 PROCEDURE — 250N000013 HC RX MED GY IP 250 OP 250 PS 637: Performed by: ANESTHESIOLOGY

## 2025-07-11 PROCEDURE — 82040 ASSAY OF SERUM ALBUMIN: CPT | Performed by: PSYCHIATRY & NEUROLOGY

## 2025-07-11 PROCEDURE — 85025 COMPLETE CBC W/AUTO DIFF WBC: CPT | Performed by: PSYCHIATRY & NEUROLOGY

## 2025-07-11 PROCEDURE — 124N000002 HC R&B MH UMMC

## 2025-07-11 PROCEDURE — 99232 SBSQ HOSP IP/OBS MODERATE 35: CPT | Performed by: PSYCHIATRY & NEUROLOGY

## 2025-07-11 PROCEDURE — 82247 BILIRUBIN TOTAL: CPT | Performed by: PSYCHIATRY & NEUROLOGY

## 2025-07-11 RX ORDER — PALIPERIDONE 6 MG/1
6 TABLET, EXTENDED RELEASE ORAL DAILY
Status: DISCONTINUED | OUTPATIENT
Start: 2025-07-12 | End: 2025-07-14

## 2025-07-11 RX ADMIN — TRAZODONE HYDROCHLORIDE 50 MG: 50 TABLET ORAL at 21:08

## 2025-07-11 RX ADMIN — NICOTINE POLACRILEX 4 MG: 2 GUM, CHEWING ORAL at 19:48

## 2025-07-11 RX ADMIN — PALIPERIDONE 3 MG: 3 TABLET, EXTENDED RELEASE ORAL at 09:21

## 2025-07-11 RX ADMIN — TRAZODONE HYDROCHLORIDE 50 MG: 50 TABLET ORAL at 23:01

## 2025-07-11 RX ADMIN — NICOTINE POLACRILEX 4 MG: 2 GUM, CHEWING ORAL at 10:20

## 2025-07-11 ASSESSMENT — ACTIVITIES OF DAILY LIVING (ADL)
ADLS_ACUITY_SCORE: 23
DRESS: SCRUBS (BEHAVIORAL HEALTH);INDEPENDENT
ADLS_ACUITY_SCORE: 23
ORAL_HYGIENE: INDEPENDENT
ADLS_ACUITY_SCORE: 23
LAUNDRY: UNABLE TO COMPLETE
ADLS_ACUITY_SCORE: 23
HYGIENE/GROOMING: INDEPENDENT

## 2025-07-11 NOTE — PLAN OF CARE
"Nursing Assessment    Recent Vitals: B/P: 107/76, T: 97.8, P: 101, R: 16     Mental Status Exam:  Appearance:  Disheveled  Behavior/relationship to examiner/demeanor:  Cooperative and Guarded  Affect (objective appearance):  Blunted/Flat  Mood (subjective report):  \"fine\"  Gait:  Normal  Speech rate, volume, coherence:  Normal, Normal, Normal    Thought Process (Associations):  Tangential and Rambling  Thought process (Rate):  Rapid  Abnormal Perception:  Hallucinations  Attention/Concentration:  Normal,  Alert, Oriented to person, Oriented to place, and Oriented to date/time  Insight:  Poor  Judgment:  Poor    Suicide Assessment:   Recent suicidal thoughts: No   Any attempts in the last 24 hours: No   Plan or considering various methods: No   Access to means: No   Verbal or Written contract for safety: Yes   Self Injurious Behavior: No  Sleep:  Hours of sleep at night: 7    General Shift Summary  Patient is isolative to room, interacts appropriately with peers and staff. Denies HI, SI, and AVH. Denies depression and anxiety, no aggressive behaviors noted. Patient is cooperative with medication and ate 100% of meals. Patient continues to talk about the leeches in the body, and the agent in the ceiling. Patient had a Orthostatic standing BP: 67/31 P: 84 rechecked BP: 113/84 P:91, labs were ordered due to circulation concerns. Patient commitment paperwork came through.    CARMELO Dorsey        " Name band;

## 2025-07-11 NOTE — PLAN OF CARE
BEH IP Unit Acuity Rating Score (UARS)  Patient is given one point for every criteria they meet.    CRITERIA SCORING   On a 72 hour hold, court hold, committed, stay of commitment, or revocation. 1    Patient LOS on BEH unit exceeds 20 days. 0  LOS: 20   Patient under guardianship, 55+, otherwise medically complex, or under age 11. 0   Suicide ideation without relief of precipitating factors. 0   Current plan for suicide. 0   Current plan for homicide. 0   Imminent risk or actual attempt to seriously harm another without relief of factors precipitating the attempt. 0   Severe dysfunction in daily living (ex: complete neglect for self care, extreme disruption in vegetative function, extreme deterioration in social interactions). 1   Recent (last 7 days) or current physical aggression in the ED or on unit. 0   Restraints or seclusion episode in past 72 hours. 0   Recent (last 7 days) or current verbal aggression, agitation, yelling, etc., while in the ED or unit. 0   Active psychosis. 1   Need for constant or near constant redirection (from leaving, from others, etc).  0   Intrusive or disruptive behaviors. 0   Patient requires 3 or more hours of individualized nursing care per 8-hour shift (i.e. for ADLs, meds, therapeutic interventions). 0   TOTAL 3

## 2025-07-11 NOTE — PLAN OF CARE
Team Note Due:  Monday     Assessment/Intervention/Current Symtoms and Care Coordination:  Chart review, + team.      Pt continues with delusional thought content. Pt had final hearing 7/9 and we are awaiting the MI commitment with  rios order from [facility].     The plan is to increase Invega, and eventually discharge to IRTS.     I let [facility] PPS Sindi Montes know if she gets the commitment and Rios prior to me to please forward it to us. I also confirmed the fax #.     I met with pt to serve him his commitment and rios order. I explained the plan is to maximize his invega and then refer to IRTS, potentially in 1-2 weeks. He stated ok.   Provider updated. A Copy was placed in his binder.     Discharge Plan or Goal:  TBD - IRTS  Will need DWYER upon discharge     Barriers to Discharge:  Symptoms     Referral Status:  TBD     Legal Status:  MI commitment + Rios   County: [facility]  File Number: [id number]   Start date: 7/11/25  Rios meds: Haldol, Prolixin, Clozaril, Risperdal, Zyprexa, Invega     PPS: Sindi Montes [phone]    Contacts (include MAURICE status):  Gilbert Serrato - Father (No MAURICE) Ph: [phone]      Upcoming Meetings and Dates/Important Information and next steps:  Coordinate care   Refer to IRTS  * Pt has meds in discharge pharmacy that he will need to get before discharge *

## 2025-07-11 NOTE — PLAN OF CARE
Problem: Sleep Disturbance  Goal: Adequate Sleep/Rest  Outcome: Progressing   Goal Outcome Evaluation:    5622-8578: Pt sleeping at the start of the shift in no apparent distress. Continue on suicide precaution. No SI related behavior noted.Slept for 7 hours

## 2025-07-11 NOTE — PROGRESS NOTES
"[facility], [place]   Psychiatric Progress Note  Hospital Day: 20        Interim History:   The patient's care was discussed with the treatment team during the daily team meeting and/or staff's chart notes were reviewed. Staff noticed small, circular white blotches on skin.   Per RN note dated 7/10:  Calm, cooperative. Alert, oriented x 3. Good eye contact. Visible in milieu. Continues to voice somatic delusion about leeches, bugs, and spider being in his body. Making reference to a \"man in the speaker\" whom he states if targeting him. Denied anxiety, depression, SI/HI, A/VH.      Denies pain. No adverse effects of medication observed or reported. PRN trazodone for sleep x2.      General appearance: alert,  Affect: blunted  Mood: neutral  Speech: at time quick speech   Thought Process: linear   Thought Content: somatic, persecutory delusions  Perception: denies hallucinations  Orientation: disoriented to situation   Insight: limited  Judgement: limited  Suicidal / Homicidal Thoughts: Denies     Patient did not require seclusion or restraints. Patient is exhibiting signs/sx of psychosis or neyda. Patient did not endorse suicidal ideation. Patient did not endorse HI. Patient is medication adherent, but only accepting low dose Invega and is not interested/willing to increase dose as recommended. Denies need for medication or hospitalization. Patient is not attending groups. Patient is sleeping better. Patient is eating adequately. Patient is attending to ADLs. He is accepting prn neuroleptics for agitation in context of severe psychosis.     Upon interview, Joo said that he slept well, appetite is good. Mood is \"fine.\" Asked appropriate questions about next steps wrt increase in Invega and IRTS referrals. He is aware that we are still awaiting Rios order.     Examined patient's skin. He does have small white blotching on skin in arms bilaterally. He said that he first noticed this " when he was a child. No new changes. No signs of infection. He agreed to labs.     Suicidal ideation: denies current or recent suicidal ideation or behaviors.    Homicidal ideation: denies current or recent homicidal ideation or behaviors.    Psychotic symptoms:  As above    Medication side effects reported: No significant side effects.    Acute medical concerns: yes, as above    Other issues reported by patient: Patient had no further questions or concerns.           Medications:     Current Facility-Administered Medications   Medication Dose Route Frequency Provider Last Rate Last Admin    paliperidone ER (INVEGA) 24 hr tablet 3 mg  3 mg Oral Daily Joselo Bangura MD   3 mg at 07/10/25 0847          Allergies:     Allergies   Allergen Reactions    No Known Drug Allergy           Labs:   No results found for this or any previous visit (from the past 24 hours).       Psychiatric Examination:     /82 (BP Location: Right arm, Patient Position: Sitting, Cuff Size: Adult Large)   Pulse 69   Temp 97.8  F (36.6  C) (Temporal)   Resp 16   SpO2 100%   Weight is 0 lbs 0 oz  There is no height or weight on file to calculate BMI.    Weight over time:  There were no vitals filed for this visit.    Orthostatic Vitals       None              Cardiometabolic risk assessment. 06/23/25      Reviewed patient profile for cardiometabolic risk factors    Date taken /Value  REFERENCE RANGE   Abdominal Obesity  (Waist Circumference)   See nursing flowsheet Women >=35 in (88 cm)   Men >=40 in (102 cm)      Triglycerides  Triglycerides   Date Value Ref Range Status   06/22/2025 222 (H) <150 mg/dL Final       >=150 mg/dL (1.7 mmol/L) or current treatment for elevated triglycerides   HDL cholesterol  Direct Measure HDL   Date Value Ref Range Status   06/22/2025 27 (L) >=40 mg/dL Final   ]   Women <50 mg/dL (1.3 mmol/L) in women or current treatment for low HDL cholesterol  Men <40 mg/dL (1 mmol/L) in men or current treatment for  low HDL cholesterol     Fasting plasma glucose (FPG) Lab Results   Component Value Date     06/20/2025    GLC 80 10/02/2020      FPG >=100 mg/dL (5.6 mmol/L) or treatment for elevated blood glucose   Blood pressure  BP Readings from Last 3 Encounters:   07/10/25 114/82   06/21/25 121/77   03/06/25 (!) 142/86    Blood pressure >=130/85 mmHg or treatment for elevated blood pressure   Family History  See family history     Mental Status Exam:  Appearance: awake, alert and unkempt.  Attitude:  evasive, guarded, and somewhat cooperative  Eye Contact:  good  Mood: anxious  Affect:  mood congruent   Speech:  clear, coherent  Language: fluent and intact in English  Psychomotor, Gait, Musculoskeletal:  no evidence of tardive dyskinesia, dystonia, or tics  Throught Process:  disorganized and illogical  Associations:  no loose associations  Thought Content:  no evidence of suicidal ideation or homicidal ideation and patient appears to be responding to internal stimuli  Insight:  limited  Judgement:  limited  Oriented to:  time, person, and place  Attention Span and Concentration:  fair  Recent and Remote Memory:  fair  Fund of Knowledge:  low-normal           Precautions:     Behavioral Orders   Procedures    Code 1 - Restrict to Unit    Routine Programming     As clinically indicated    Status 15     Every 15 minutes.    Suicide precautions: Suicide Risk: HIGH; Clinical rationale to override score: modification to the care environment     Patients on Suicide Precautions should have a Combination Diet ordered that includes a Diet selection(s) AND a Behavioral Tray selection for Safe Tray - with utensils, or Safe Tray - NO utensils       Suicide Risk:   HIGH     Clinical rationale to override score::   modification to the care environment          Diagnoses:     Schizophrenia, decompensated    Clinically Significant Risk Factors                                    # Financial/Environmental Concerns:                        Assessment & Plan:     Assessment and hospital summary:  30-year-old male brought to the emergency room by parents, decompensating with delusional statements and auditory hallucinations, noncompliant with meds. Declining Invega today and does not believe he has a mental illness. Family expressing concerns about safety. Will file for MI commitment and Rios on 6/23.     Today's Changes:  None. Plan is to optimize PTA Invega once Rios is in place. Not currently meeting criteria for psychiatric emergency.     CMP and CBC with diff checked due to concerns about skin blotching, which is not new. Labs completely normal.     Target psychiatric symptoms and interventions:  Invega 3 mg daily. Pt may decline. Does not wish to optimize and is currently at the lowest dose.     Risks, benefits, and alternatives discussed at length with patient.     Acute Medical Problems and Treatments:  Acute medical concerns:  - No acute medical concerns    Pertinent labs/imaging:  Dyslipidemia noted on lipid profile dated 6/22  CBC with diff and CMP checked on 7/11 and are wnl    Behavioral/Psychological/Social:  - Encourage unit programming    Safety:  - Continue precautions as noted above  - Status 15 minute checks    Legal Status: court hold. Final hearing was on 7/9. Awaiting outcome.     Disposition Plan   Reason for ongoing admission: is unable to care for self due to severe psychosis or neyda  Discharge location: TBD  Discharge Medications: not ordered  Follow-up Appointments: not scheduled    Entered by: Jo-Ann Lainez MD on 07/11/2025  at 8:04 AM

## 2025-07-11 NOTE — CARE PLAN
07/11/25 0300   Observation Type   How often does the Patient require Staff intervention/redirection? occasionally   Harm Category none   Level of Special Monitoring none

## 2025-07-12 PROCEDURE — 250N000013 HC RX MED GY IP 250 OP 250 PS 637: Performed by: PSYCHIATRY & NEUROLOGY

## 2025-07-12 PROCEDURE — 124N000002 HC R&B MH UMMC

## 2025-07-12 RX ORDER — QUETIAPINE FUMARATE 50 MG/1
50-100 TABLET, FILM COATED ORAL
Status: DISCONTINUED | OUTPATIENT
Start: 2025-07-12 | End: 2025-07-13

## 2025-07-12 RX ADMIN — TRAZODONE HYDROCHLORIDE 50 MG: 50 TABLET ORAL at 20:23

## 2025-07-12 RX ADMIN — NICOTINE POLACRILEX 2 MG: 2 GUM, CHEWING ORAL at 09:13

## 2025-07-12 RX ADMIN — PALIPERIDONE 6 MG: 6 TABLET, EXTENDED RELEASE ORAL at 09:13

## 2025-07-12 ASSESSMENT — ACTIVITIES OF DAILY LIVING (ADL)
HYGIENE/GROOMING: INDEPENDENT
ADLS_ACUITY_SCORE: 23
ORAL_HYGIENE: INDEPENDENT
ADLS_ACUITY_SCORE: 23
DRESS: INDEPENDENT;SCRUBS (BEHAVIORAL HEALTH)
ADLS_ACUITY_SCORE: 23
LAUNDRY: UNABLE TO COMPLETE
ADLS_ACUITY_SCORE: 23
HYGIENE/GROOMING: INDEPENDENT

## 2025-07-12 NOTE — PLAN OF CARE
Goal Outcome Evaluation:  Problem: Sleep Disturbance  Goal: Adequate Sleep/Rest  Outcome: Progressing       Pt in bed at beginning of shift, breathing quiet and unlabored. Pt slept through shift. Pt slept 5.25 hours.      No pt complaints or concerns at this time.      No PRNs given. Will continue to monitor.

## 2025-07-12 NOTE — CARE PLAN
07/12/25 0351   Observation Type   How often does the Patient require Staff intervention/redirection? rarely   Harm Category none   No Harm Category Noted at This Time patient sleeping, no interaction   Level of Special Monitoring none

## 2025-07-12 NOTE — PLAN OF CARE
"Nursing Assessment    Recent Vitals: B/P: 123/80, T: 98.1, P: 78, R: 16 SpO2: 97    Mental Status Exam:  Appearance:  Disheveled  Behavior/relationship to examiner/demeanor:  Cooperative and Guarded  Affect (objective appearance):  Blunted/Flat and Anxious/Nervous  Mood (subjective report):  \"fine\"  Gait:  Normal  Speech rate, volume, coherence:  Normal, Normal, Rambling    Thought Process (Associations):    Thought process (Rate):  Normal  Abnormal Perception:  Hallucinations  Attention/Concentration:  Fair,  Alert, Oriented to person, Oriented to place, and Oriented to date/time  Insight:  Poor  Judgment:  Poor    Suicide Assessment:   Recent suicidal thoughts: No   Any attempts in the last 24 hours: No   Plan or considering various methods: No   Access to means: No   Verbal or Written contract for safety: Yes   Self Injurious Behavior: No  Sleep:  Hours of sleep at night: 5.25      General Shift Summary  Patient is withdrawn to room, interacts with peers and staff appropriately. Patient continues to talk to himself under his breath and paces the hallways. Denies HI, SI, Anxiety, Depression and all other psych symptoms. Patient is cooperative with meds and ate 100% of meals.       CARMELO Dorsey        "

## 2025-07-12 NOTE — PLAN OF CARE
"Nursing assessment completed. Patient awake and visible for much of the shift. He was served commitment and Rios paperwork today. He appears to be managing the information well and has offered no concerns. Presents with a blunted affect. Guarded. He states he does believe the medications are helping him to \"level out my attitude\". Pt requested and received PRN trazodone HS. He asks for PRN Seroquel as well, but does not have it currently ordered. Denies SI/SIB or thoughts to harm others. Continue to monitor and assess.         "

## 2025-07-13 PROCEDURE — 124N000002 HC R&B MH UMMC

## 2025-07-13 PROCEDURE — 250N000013 HC RX MED GY IP 250 OP 250 PS 637: Performed by: PSYCHIATRY & NEUROLOGY

## 2025-07-13 PROCEDURE — 97150 GROUP THERAPEUTIC PROCEDURES: CPT | Mod: GO

## 2025-07-13 RX ORDER — QUETIAPINE FUMARATE 50 MG/1
50-100 TABLET, FILM COATED ORAL
Status: DISCONTINUED | OUTPATIENT
Start: 2025-07-13 | End: 2025-07-24

## 2025-07-13 RX ADMIN — QUETIAPINE FUMARATE 100 MG: 50 TABLET ORAL at 20:06

## 2025-07-13 RX ADMIN — NICOTINE POLACRILEX 4 MG: 2 GUM, CHEWING ORAL at 13:11

## 2025-07-13 RX ADMIN — TRAZODONE HYDROCHLORIDE 50 MG: 50 TABLET ORAL at 20:06

## 2025-07-13 RX ADMIN — NICOTINE POLACRILEX 2 MG: 2 GUM, CHEWING ORAL at 09:51

## 2025-07-13 RX ADMIN — PALIPERIDONE 6 MG: 6 TABLET, EXTENDED RELEASE ORAL at 08:54

## 2025-07-13 ASSESSMENT — ACTIVITIES OF DAILY LIVING (ADL)
ADLS_ACUITY_SCORE: 23
DRESS: SCRUBS (BEHAVIORAL HEALTH);INDEPENDENT
ADLS_ACUITY_SCORE: 23
ADLS_ACUITY_SCORE: 23
DRESS: SCRUBS (BEHAVIORAL HEALTH);INDEPENDENT
ORAL_HYGIENE: INDEPENDENT
ADLS_ACUITY_SCORE: 23
HYGIENE/GROOMING: INDEPENDENT
ADLS_ACUITY_SCORE: 23
LAUNDRY: UNABLE TO COMPLETE
ADLS_ACUITY_SCORE: 23
LAUNDRY: UNABLE TO COMPLETE
ADLS_ACUITY_SCORE: 23
ADLS_ACUITY_SCORE: 23
HYGIENE/GROOMING: INDEPENDENT
ADLS_ACUITY_SCORE: 23
ORAL_HYGIENE: INDEPENDENT
ADLS_ACUITY_SCORE: 23

## 2025-07-13 NOTE — PLAN OF CARE
Problem: Sleep Disturbance  Goal: Adequate Sleep/Rest  Outcome: Progressing   Goal Outcome Evaluation:3702-1915: Pt sleeping at the start of the shift in no apparent distress. Continue on suicide precaution. No SI related behavior noted.Slept for 7 hours and currently still sleeping.

## 2025-07-13 NOTE — CARE PLAN
07/13/25 0300   Observation Type   How often does the Patient require Staff intervention/redirection? occasionally   Harm Category other harm (comment)   Level of Special Monitoring none
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
## 2025-07-13 NOTE — PLAN OF CARE
"Nursing Assessment    Recent Vitals: B/P: 120/83, T: 98, P: 73, R: 18     Mental Status Exam:  Appearance:  Poorly groomed and Malodorous  Behavior/relationship to examiner/demeanor:  Dismissive and Guarded  Affect (objective appearance):  Restricted and Blunted/Flat  Mood (subjective report):  \"good\"  Gait:  Normal  Speech rate, volume, coherence:  Normal, Normal, Normal    Thought Process (Associations):  Paranoia  Thought process (Rate):  Normal  Abnormal Perception:  Hallucinations  Attention/Concentration:  Normal,  Alert, Oriented to person, Oriented to place, and Oriented to date/time  Insight:  Poor  Judgment:  Poor      Sleep:  Hours of sleep at night: 7    General Shift Summary  Patient visible in milieu, interacting with peers, playing cards with another patient, watching movies. Denies depression, anxiety, SI, HI and AVH. Is cooperative with medications, ate 100% of meals. Forgot he had already taken his daily meds.    CARMELO Dorsey          "

## 2025-07-14 PROCEDURE — 250N000013 HC RX MED GY IP 250 OP 250 PS 637: Performed by: PSYCHIATRY & NEUROLOGY

## 2025-07-14 PROCEDURE — 124N000002 HC R&B MH UMMC

## 2025-07-14 PROCEDURE — 99232 SBSQ HOSP IP/OBS MODERATE 35: CPT | Performed by: PSYCHIATRY & NEUROLOGY

## 2025-07-14 RX ORDER — OLANZAPINE 10 MG/2ML
10 INJECTION, POWDER, FOR SOLUTION INTRAMUSCULAR DAILY
Status: DISCONTINUED | OUTPATIENT
Start: 2025-07-15 | End: 2025-07-17

## 2025-07-14 RX ADMIN — OLANZAPINE 10 MG: 10 TABLET, FILM COATED ORAL at 17:50

## 2025-07-14 RX ADMIN — TRAZODONE HYDROCHLORIDE 50 MG: 50 TABLET ORAL at 19:57

## 2025-07-14 RX ADMIN — QUETIAPINE FUMARATE 100 MG: 50 TABLET ORAL at 19:57

## 2025-07-14 RX ADMIN — NICOTINE POLACRILEX 4 MG: 2 GUM, CHEWING ORAL at 10:24

## 2025-07-14 RX ADMIN — OLANZAPINE 10 MG: 10 TABLET, FILM COATED ORAL at 12:01

## 2025-07-14 RX ADMIN — NICOTINE POLACRILEX 4 MG: 2 GUM, CHEWING ORAL at 18:28

## 2025-07-14 RX ADMIN — PALIPERIDONE 6 MG: 6 TABLET, EXTENDED RELEASE ORAL at 08:38

## 2025-07-14 ASSESSMENT — ACTIVITIES OF DAILY LIVING (ADL)
ADLS_ACUITY_SCORE: 23
ORAL_HYGIENE: INDEPENDENT
ADLS_ACUITY_SCORE: 23
HYGIENE/GROOMING: SHOWER;INDEPENDENT
ADLS_ACUITY_SCORE: 23
DRESS: SCRUBS (BEHAVIORAL HEALTH);INDEPENDENT
ADLS_ACUITY_SCORE: 23
HYGIENE/GROOMING: INDEPENDENT
ADLS_ACUITY_SCORE: 23
DRESS: INDEPENDENT;SCRUBS (BEHAVIORAL HEALTH)
ADLS_ACUITY_SCORE: 23
LAUNDRY: UNABLE TO COMPLETE
ADLS_ACUITY_SCORE: 23
LAUNDRY: WITH SUPERVISION
ADLS_ACUITY_SCORE: 23
ORAL_HYGIENE: INDEPENDENT
ADLS_ACUITY_SCORE: 23
ADLS_ACUITY_SCORE: 23

## 2025-07-14 NOTE — PLAN OF CARE
Goal Outcome Evaluation:  Problem: Sleep Disturbance  Goal: Adequate Sleep/Rest  Outcome: Progressing       Pt in bed at beginning of shift, breathing quiet and unlabored. Pt slept through shift. Pt slept 6.75 hours.      No pt complaints or concerns at this time.      No PRNs given. Will continue to monitor.
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
## 2025-07-14 NOTE — PROVIDER NOTIFICATION
07/14/25 1238   Individualization/Patient Specific Goals   Patient Personal Strengths family/social support   Patient Vulnerabilities history of unsuccessful treatment;lacks insight into illness;housing insecurity;family/relationship conflict;adverse childhood experience(s)   Interprofessional Rounds   Participants nursing;CTC;psychiatrist   Behavioral Team Discussion   Participants Dr Migel MD, Ara COVARRUBIAS, Dilcia BARRERA RN Manager, Esha JERRY [facility], Chey Allen RN, Haily MA RN   Progress Pt is slightly improving, he has been calm, cooperative, no behavioral incidents during admission. He gets frustrated at times but will go to his room. Pt started DWYER today and will need a week to stabilize on this prior to discharge to IRTS. IRTS referrals will be started today. Pt called a few IRTS on his own after being given some information about a few. Bryan Mendez called me today and said he had called them and they can put him on the waitlist if a referral is sent but they have no open beds.   Medical/Physical See H&P   Precautions Suicide   Plan Pt started on DWYER Invega today, will need about a week to stabilize. Plan is to discharge to IRTS with outpatient psychiatry.   Anticipated Discharge Disposition IRTS     Goal Outcome Evaluation:  PRECAUTIONS AND SAFETY    Behavioral Orders   Procedures    Code 1 - Restrict to Unit    Routine Programming     As clinically indicated    Status 15     Every 15 minutes.    Suicide precautions: Suicide Risk: HIGH; Clinical rationale to override score: modification to the care environment     Patients on Suicide Precautions should have a Combination Diet ordered that includes a Diet selection(s) AND a Behavioral Tray selection for Safe Tray - with utensils, or Safe Tray - NO utensils       Suicide Risk:   HIGH     Clinical rationale to override score::   modification to the care environment       Safety  Safety WDL: WDL  Patient Location: patient room, own  Observed Behavior:  calm, sitting  Observed Behavior (Comment): sitting in the lounge  Safety Measures: environmental rounds completed, suicide check-in completed, suicide assessment completed, self-directed behavior promoted, safety rounds completed  Diversional Activity: television  De-Escalation Techniques: appropriate behavior reinforced, diversional activity encouraged, quiet time facilitated, reoriented  Suicidality: Status 15

## 2025-07-14 NOTE — CARE PLAN
07/14/25 0550   Observation Type   Harm Category none   No Harm Category Noted at This Time patient sleeping, no interaction   Level of Special Monitoring none
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
## 2025-07-14 NOTE — PLAN OF CARE
"  Problem: Psychotic Signs/Symptoms  Goal: Improved Mood Symptoms (Psychotic Signs/Symptoms)  Outcome: Progressing  Intervention: Optimize Emotion and Mood   Goal Outcome Evaluation:    Plan of Care Reviewed With: patient      Pt was out in the milieu. Attended evening group activities. Pt walked the halls. Pt also watched TV. Pt denies pain and psych symptoms including SI, HI, SIB, AVH. Pt reported his mood as \" good\" . Pt presented a flat affect. Pt has good hygiene and good appetite. Pt requested his father's phone number to call him. Pt was medications compliant. Calm and cooperative with nursing cares. No delusional thought expressed to writer during this shift . No physical and behavioral concern throughout the shift.           "

## 2025-07-14 NOTE — PLAN OF CARE
Rehab Group    Start time: 16:30  End time: 17:10  Patient time total: 35 minutes    attended partial group    #5 attended   Group Type: occupational therapy   Group Topic Covered: cognitive activities, coping skills, healthy leisure time, and social skills     Group Session Detail:  Patient engaged in a leisure activity with a visuospatial and cognitive component in order to promote: problem solving skills, improve attention, emphasize new learning, exercise cognitive skills, and foster leisure and relaxation.       Patient Response/Contribution:  cooperative with task, socially appropriate, and actively engaged     Patient Detail:  Patient remained an engaged, attentive, and cooperative group attendee. Patient was willing to learn a novel task and demonstrated active listening during introduction and instructions. Patient exhibited a gross understanding of game objective; however, he lacked strategic awareness as the game progressed.  Patient was attentive to the task and was able to track the activity successfully. No social interaction with peers observed. No safety or behavioral concerns identified.      [id number] OT Group (2 or more in attendance)      Patient Active Problem List   Diagnosis    Encopresis    Urinary incontinence    Sprain of medial collateral ligament of knee    Auditory hallucination    Schizophrenia (H)    Suicide attempt (H)    Chronic paranoid schizophrenia (H)    Psychophysiological insomnia    Delusional thoughts (H)    Mood changes

## 2025-07-14 NOTE — CARE PLAN
07/14/25 1318   Observation Type   Harm Category none   Level of Special Monitoring milieu management

## 2025-07-14 NOTE — PLAN OF CARE
"Team Note Due:  Monday     Assessment/Intervention/Current Symtoms and Care Coordination:  Chart review, + team.      Pt increased to 6mg Invega. Can make referrals to IRTS.   Plan to increase invega up to 12mg.     Received a call from Bryan Mendez. Pt called to advocate for himself to check for a bed. She stated we can submit a referral for him to be on their waitlist if he wanted.     Pt called some other IRTS himself as well and shared he found a bed for Thursday per RN.  I met with pt and he stated somewhere in [place] but could not give a name then said \"nvm they said I'll be here another 2 weeks\". He appeared flustered. I shared I would double check with his care team.     Per provider, DWYER will be started today. Pt will need at least one week here to stabilize on that.     Can start IRTS referrals. I prepared IRTS referrals for pt and when I went to his room he was sleeping with eyes closed. Will try again later.     Behavioral team note complete.     Discharge Plan or Goal:  TBD - IRTS  Will need DWYER upon discharge     Barriers to Discharge:  Symptoms     Referral Status:  TBD     Legal Status:  MI commitment + [facility]: [facility]  File Number: [id number]   Start date: 7/11/25  [facility] meds: Haldol, Prolixin, Clozaril, Risperdal, Zyprexa, Invega     PPS: Sindi Montes [phone]    Contacts (include MAURICE status):  Gilbert Serrato - Father (No MAURICE) Ph: [phone]      Upcoming Meetings and Dates/Important Information and next steps:  Coordinate care   Refer to IRTS  * Pt has meds in discharge pharmacy that he will need to get before discharge *      "

## 2025-07-14 NOTE — PROGRESS NOTES
"Pt approached MT asking \"have you seen her?\", referring to the doctor.  Pt stated he had found a bed for himself that is open Thursday, and was wanting to speak to the doctor.  MT directed pt to unit staff.  Pt remained on the fringes of session this morning, declining offers to participate.  No charge.    Ayleen Perales MT-BC  Board-Certified Music Therapist     "

## 2025-07-15 PROCEDURE — 250N000013 HC RX MED GY IP 250 OP 250 PS 637: Performed by: PSYCHIATRY & NEUROLOGY

## 2025-07-15 PROCEDURE — 124N000002 HC R&B MH UMMC

## 2025-07-15 RX ADMIN — TRAZODONE HYDROCHLORIDE 50 MG: 50 TABLET ORAL at 19:56

## 2025-07-15 RX ADMIN — PALIPERIDONE 9 MG: 6 TABLET, EXTENDED RELEASE ORAL at 08:24

## 2025-07-15 RX ADMIN — NICOTINE POLACRILEX 2 MG: 2 GUM, CHEWING ORAL at 11:35

## 2025-07-15 RX ADMIN — OLANZAPINE 10 MG: 10 TABLET, FILM COATED ORAL at 10:03

## 2025-07-15 RX ADMIN — OLANZAPINE 10 MG: 10 TABLET, FILM COATED ORAL at 16:23

## 2025-07-15 RX ADMIN — NICOTINE POLACRILEX 4 MG: 2 GUM, CHEWING ORAL at 16:00

## 2025-07-15 RX ADMIN — QUETIAPINE FUMARATE 100 MG: 50 TABLET ORAL at 19:56

## 2025-07-15 ASSESSMENT — ACTIVITIES OF DAILY LIVING (ADL)
ADLS_ACUITY_SCORE: 23
LAUNDRY: WITH SUPERVISION
ADLS_ACUITY_SCORE: 23
ORAL_HYGIENE: INDEPENDENT
ADLS_ACUITY_SCORE: 23
DRESS: SCRUBS (BEHAVIORAL HEALTH)
HYGIENE/GROOMING: INDEPENDENT
ADLS_ACUITY_SCORE: 23

## 2025-07-15 NOTE — PLAN OF CARE
"RN Assessment   - The patient exhibited paranoia, delusional thinking, A/V hallucinations and related distress throughout the duration of waking hours today  - Initially declined his Invega Sustenna injection, stating he did not feel he needed to be medicated or injected with anything   - The patient later approached [facility] and requested olanzapine 10 mg and stated that he would accept his injection.   - Following the injection as writer was leaving the room observed to push his hand into the air towards the wall and yelled, \"Don't touch me!\"   - The patient stated that he was brought to the hospital due to FBI injecting him with cocaine  - He made statements that there had been leeches implanted into his throat and chest, and that pedophiles and federal agents were talking to him through vents. He claimed to be part of a [employer] experiment.  - Stated that he was given a penile injection prior to coming into the hospital, which he stated caused his insides to turn blue.  - He was observed yelling into space at the entity not visible to others, \"What are you looking at !? You fucking did this to me!\"   - The patient later called 911 and stated that staff were talking to him through microphones in his teeth.  - Demanded for police on the init so he can be interviewed regarding alleged harassment and molestation by the FBI.  - Stated that hospitalization was unwarranted as he did not perceive himself as mentally ill or experiencing symptoms.  - He remained quite agitated and paranoid.  - Despite obvious distress denied active or passive suicidal ideations or thoughts of harming others.  - Ate his meal, and outside of delusional statements regarding color of his organs and having leeches in his throat and chest did not had any physical complaints.   "

## 2025-07-15 NOTE — PLAN OF CARE
"Team Note Due:  Monday     Assessment/Intervention/Current Symtoms and Care Coordination:  Chart review, + team.      Pt increased to 6mg Invega yesterday. Plan to increase invega up to 12mg.   Got first dose of DWYER yesterday.     Pt was just about to make a phone call and I was going to tell him about IRTS. Pt stated I'm calling the . I suggested not to call 911. He said \"I got to!, they are zapping me again! and pointed up to the camera/intercom. He had rapid, pressured speech. He said \"they are raping me!!\". Pt was agitated. Pt then went to his room and was trashing papers/items on the floor. Pt came out and swore under his breath. Pt was paranoid and stated 'they are doing it again!'. RN informed Pt that the intercom says messages at times but it's not recording/listening and it's not the FBI. Pt stated he thinks he has to be here for 3 weeks.     I attempted to bring the MAURICE's for IRTS to patient but he stated 'just fuck off' and put the blanket over his head. His room has papers thrown all over the floor.     Will attempt to see pt later. He got a PRN.     I attempted to see pt later on, he was still tense and irritated. I asked if he wanted to go over IRTS referrals now and he stated \"it doesn't matter they are not going to let me anyways!\". He said not for another week or so. I stated we would start referrals so that he would be ready to go sooner than later. Pt stated that \"they will follow me wherever I go\", \"they touched my butt\". Pt still appearing to be experiencing hallucinations and delusions. Today he was more irritable than he has been, swearing and acting tense. He appears to be in a lot of distress from the hallucinations/delusions about FBI tracking him.     Discharge Plan or Goal:  IRTS  Will need DWYER upon discharge     Barriers to Discharge:  Symptoms     Referral Status:  TBD     Legal Status:  MI commitment + [facility]   [facility]: [facility]  File Number: [id number]   Start date: " 7/11/25  [facility] meds: Haldol, Prolixin, Clozaril, Risperdal, Zyprexa, Invega     PPS: Sindi Montes [phone]    Contacts (include MAURICE status):  Gilbert Serrato - Father (No MAURICE) Ph: [phone]      Upcoming Meetings and Dates/Important Information and next steps:  Coordinate care   Refer to IRTS  * Pt has meds in discharge pharmacy that he will need to get before discharge *

## 2025-07-15 NOTE — CARE PLAN
07/14/25 2819   Observation Type   How often does the Patient require Staff intervention/redirection? very frequently   Harm Category unmindful harm   Unmindful Harm confused/disoriented - unaware of time, location, and identity;disorganized - distorted reality, incongruent emotions or reactions   Level of Special Monitoring milieu management

## 2025-07-15 NOTE — PLAN OF CARE
"Goal Outcome Evaluation:    Plan of Care Reviewed With: patient      Problem: Suicide Risk  Goal: Absence of Self-Harm  Outcome: Progressing  Intervention: Promote Psychosocial Wellbeing  Recent Flowsheet Documentation  Taken 7/15/2025 1800 by Jass Cortez RN  Sleep/Rest Enhancement: noise level reduced     Patient appears with a flat affect, anxious, and paranoia. Patient came to the nursing station and requested for Zyprexa and states \" I am about to freak out\". Patient was given 10 mg of PRN Zyprexa.  Patient then came to the nursing station and asked the writer \" when I am leaving, is there an update on when I will be leaving\".     He did endorse feeling anxious and feeling depressed, but denies having any thoughts of wanting to harm himself or others, and denies having visual hallucinations/auditory hallucinations. Patient was observed responding to internal stimuli. His vitals are within normal limits.    Blood pressure 116/75, pulse 98, temperature 97  F (36.1  C), temperature source Temporal, resp. rate 14, SpO2 96%.            "

## 2025-07-15 NOTE — PLAN OF CARE
Problem: Psychotic Signs/Symptoms  Goal: Improved Behavioral Control (Psychotic Signs/Symptoms)  Outcome: Progressing  Patient was visible in the lounge area observed talking to himself for most of the morning. He told the  that the FBI are in his room and they area raping him (see 's note). He told  that he will call 911 and soon after, proceeded to call the police. He was observed getting agitated and despite verbal redirections from another nurse. Writer offered prn Zyprexa to manage his agitation and he accepted the medication without issues. The prn was helpful because patient was able to stay calm fro the rest of the shift with no talk of FBI. He took a shower and ate 100% of breakfast and lunch. Patient continue to show little insight into his mental health and still believe the FBI are onto him and trying to hurt him.     /73 (BP Location: Right arm, Patient Position: Sitting, Cuff Size: Adult Large)   Pulse 68   Temp 97.7  F (36.5  C) (Temporal)   Resp 14   SpO2 97%

## 2025-07-15 NOTE — PLAN OF CARE
Problem: Sleep Disturbance  Goal: Adequate Sleep/Rest  Outcome: Progressing   Goal Outcome Evaluation:     [id number]: Pt sleeping at the start of the shift in no apparent distress. Continue on suicide precaution. No SI related behavior noted.Slept all night for 7 hours. Safety checks maintained.

## 2025-07-15 NOTE — PLAN OF CARE
BEH IP Unit Acuity Rating Score (UARS)  Patient is given one point for every criteria they meet.    CRITERIA SCORING   On a 72 hour hold, court hold, committed, stay of commitment, or revocation. 1    Patient LOS on BEH unit exceeds 20 days. 1 LOS: 24   Patient under guardianship, 55+, otherwise medically complex, or under age 11. 0   Suicide ideation without relief of precipitating factors. 0   Current plan for suicide. 0   Current plan for homicide. 0   Imminent risk or actual attempt to seriously harm another without relief of factors precipitating the attempt. 0   Severe dysfunction in daily living (ex: complete neglect for self care, extreme disruption in vegetative function, extreme deterioration in social interactions). 0   Recent (last 7 days) or current physical aggression in the ED or on unit. 0   Restraints or seclusion episode in past 72 hours. 0   Recent (last 7 days) or current verbal aggression, agitation, yelling, etc., while in the ED or unit. 0   Active psychosis. 1   Need for constant or near constant redirection (from leaving, from others, etc).  0   Intrusive or disruptive behaviors. 0   Patient requires 3 or more hours of individualized nursing care per 8-hour shift (i.e. for ADLs, meds, therapeutic interventions). 0   TOTAL 3

## 2025-07-15 NOTE — CARE PLAN
07/15/25 0530   Observation Type   Harm Category none   No Harm Category Noted at This Time patient sleeping, no interaction   Level of Special Monitoring none

## 2025-07-16 PROCEDURE — H2032 ACTIVITY THERAPY, PER 15 MIN: HCPCS

## 2025-07-16 PROCEDURE — 250N000013 HC RX MED GY IP 250 OP 250 PS 637: Performed by: PSYCHIATRY & NEUROLOGY

## 2025-07-16 PROCEDURE — 124N000002 HC R&B MH UMMC

## 2025-07-16 PROCEDURE — 250N000013 HC RX MED GY IP 250 OP 250 PS 637: Performed by: NURSE PRACTITIONER

## 2025-07-16 PROCEDURE — 99232 SBSQ HOSP IP/OBS MODERATE 35: CPT | Performed by: PSYCHIATRY & NEUROLOGY

## 2025-07-16 RX ADMIN — NICOTINE POLACRILEX 2 MG: 2 GUM, CHEWING ORAL at 07:44

## 2025-07-16 RX ADMIN — QUETIAPINE FUMARATE 100 MG: 50 TABLET ORAL at 20:03

## 2025-07-16 RX ADMIN — TRAZODONE HYDROCHLORIDE 50 MG: 50 TABLET ORAL at 20:03

## 2025-07-16 RX ADMIN — PALIPERIDONE 9 MG: 6 TABLET, EXTENDED RELEASE ORAL at 07:49

## 2025-07-16 RX ADMIN — HYDROXYZINE HYDROCHLORIDE 50 MG: 50 TABLET, FILM COATED ORAL at 12:45

## 2025-07-16 ASSESSMENT — ACTIVITIES OF DAILY LIVING (ADL)
ADLS_ACUITY_SCORE: 23

## 2025-07-16 NOTE — CARE PLAN
07/15/25 2046   Observation Type   How often does the Patient require Staff intervention/redirection? frequently   Harm Category unmindful harm   Level of Special Monitoring milieu management

## 2025-07-16 NOTE — PLAN OF CARE
"\"They zapped me with lasers again today and raped me in my room.\"  \"The FBI will follow wherever I go.\"  \"The leeches and the microchip were implanted by them.\"  \"Any news about my discharge?\"    Good eye contact, intense with increased rate of speech while speaking of delusional thought content. Continuing preoccupation with at times lent responses. Perseverative about discharge. Polite manner.   Denies thoughts of harming anyone.  Unchanged denial of the need and desire for hospitalisation.    Reports no side effects of medication.     Requested and receive Seroquel 100 mg and Trazodone 50 mg at 20:03.     Appearance: clean in food stained clothing  Body Language:cooperative  Attitude:cooperative  Mood:neutral to defensive  Affect:blunted  Thought Process:linear  Thought content:some delusional, paranoid content  Perceptions:altered by delusions  Suicidal/homicidal:denies/denies  Knowledge,Insight,Judgement:intact/impaired/impaired  Attention/Concentration:fair/fair  Memory:Recent-intact                Remote-intact  Speech:WNL volume. Rambling at times  Cognition:Oriented x3. Not oriented to circumstance  Psychomotor: No abnormal body movements  Sleep/Activity level:  no day/evening sleeping  Motivation: For discharge only     Plan: Monitor and document mood and behaviour, thought process and content. Establish and maintain therapeutic relationship. Educate about diagnoses, medications, treatment, legal status, plan of care. Address preexisting and concurrent medical concerns.      P:disturbed thought process  G:rational thought process  O:not progressing        "

## 2025-07-16 NOTE — PLAN OF CARE
BEH IP Unit Acuity Rating Score (UARS)  Patient is given one point for every criteria they meet.    CRITERIA SCORING   On a 72 hour hold, court hold, committed, stay of commitment, or revocation. 1    Patient LOS on BEH unit exceeds 20 days. 1 LOS: 25   Patient under guardianship, 55+, otherwise medically complex, or under age 11. 0   Suicide ideation without relief of precipitating factors. 0   Current plan for suicide. 0   Current plan for homicide. 0   Imminent risk or actual attempt to seriously harm another without relief of factors precipitating the attempt. 0   Severe dysfunction in daily living (ex: complete neglect for self care, extreme disruption in vegetative function, extreme deterioration in social interactions). 1   Recent (last 7 days) or current physical aggression in the ED or on unit. 0   Restraints or seclusion episode in past 72 hours. 0   Recent (last 7 days) or current verbal aggression, agitation, yelling, etc., while in the ED or unit. 1   Active psychosis. 1   Need for constant or near constant redirection (from leaving, from others, etc).  0   Intrusive or disruptive behaviors. 0   Patient requires 3 or more hours of individualized nursing care per 8-hour shift (i.e. for ADLs, meds, therapeutic interventions). 0   TOTAL 5

## 2025-07-16 NOTE — PLAN OF CARE
Goal Outcome Evaluation:    At the start of the shift pt was up pacing in the lounge. He is med complaint and denies all med SE's. Pt endorses high anxiety but denies all other psychiatric symptoms including SI, HI and SIB. He received PRN hydroxyzine with relief. He presents as guarded, withdrawn and tense with a blunted affect.No overtly delusional or paranoid statement noted. Pt still lack insight and judgement. After he had a conversation with [facility] about signing MAURICE's for IRTS programs pt called his mother to tell her he's discharging today, which is not true. When writer asked him if there was anything confusion about his discharge plan for possibly nexr week he ignored writer. He spent time watching TV in the lounge and room but declined engaging with most staff and peers. There are no acute physical concerns, and the patient denies experiencing any pain       /75   Pulse 98   Temp 97.9  F (36.6  C) (Oral)   Resp 14   SpO2 98%        Last 24H PRN:     hydrOXYzine HCl (ATARAX) tablet 50 mg, 50 mg at 07/16/25 1245    nicotine (NICORETTE) gum 2-4 mg, 2 mg at 07/16/25 0744    OLANZapine (zyPREXA) tablet 10 mg, 10 mg at 07/15/25 1623 **OR** OLANZapine (zyPREXA) injection 10 mg    QUEtiapine (SEROquel) tablet  mg, 100 mg at 07/15/25 1956    traZODone (DESYREL) tablet 50 mg, 50 mg at 07/15/25 1956

## 2025-07-16 NOTE — PROGRESS NOTES
"[facility] [place]   Psychiatric Progress Note  Hospital Day: 25        Interim History:   The patient's care was discussed with the treatment team during the daily team meeting and/or staff's chart notes were reviewed. Patient did not require seclusion or restraints. Patient is exhibiting signs/sx of psychosis or neyda, continues to respond to internal stimuli. Has been telling staff that the FBI are \"raping\" him while alone in his room. Patient did not endorse suicidal ideation. Patient did not endorse HI. Patient is attending groups. Patient is sleeping well. Patient is eating adequately. Patient is attending to ADLs. Medication adherent. Tolerating higher dose of Invega without issue. He did not sign ROIs for IRTS due to paranoia. He called 911 yesterday.     Upon interview, Joo remains highly symptomatic and unchanged since dose increase in Invega. Continues to express concerns about FBI monitoring him. Still believes there are leeches in his body and that he is being drugged.     Suicidal ideation: denies current or recent suicidal ideation or behaviors.    Homicidal ideation: denies current or recent homicidal ideation or behaviors.    Psychotic symptoms:  As above    Medication side effects reported: No significant side effects.    Acute medical concerns: yes, as above    Other issues reported by patient: Patient had no further questions or concerns.           Medications:     Current Facility-Administered Medications   Medication Dose Route Frequency Provider Last Rate Last Admin    paliperidone ER (INVEGA) 24 hr tablet 9 mg  9 mg Oral Daily Jo-Ann Lainez MD   9 mg at 07/16/25 0749    Or    OLANZapine (zyPREXA) injection 10 mg  10 mg Intramuscular Daily Jo-Ann Lainez MD        [START ON 7/21/2025] paliperidone (INVEGA SUSTENNA) injection AILYN 156 mg  156 mg Intramuscular Once Jo-Ann Lainez MD              Allergies:     Allergies   Allergen " Reactions    No Known Drug Allergy           Labs:   No results found for this or any previous visit (from the past 24 hours).       Psychiatric Examination:     /75   Pulse 98   Temp 97.9  F (36.6  C) (Oral)   Resp 14   SpO2 98%   Weight is 0 lbs 0 oz  There is no height or weight on file to calculate BMI.    Weight over time:  There were no vitals filed for this visit.    Orthostatic Vitals       None              Cardiometabolic risk assessment. 06/23/25      Reviewed patient profile for cardiometabolic risk factors    Date taken /Value  REFERENCE RANGE   Abdominal Obesity  (Waist Circumference)   See nursing flowsheet Women >=35 in (88 cm)   Men >=40 in (102 cm)      Triglycerides  Triglycerides   Date Value Ref Range Status   06/22/2025 222 (H) <150 mg/dL Final       >=150 mg/dL (1.7 mmol/L) or current treatment for elevated triglycerides   HDL cholesterol  Direct Measure HDL   Date Value Ref Range Status   06/22/2025 27 (L) >=40 mg/dL Final   ]   Women <50 mg/dL (1.3 mmol/L) in women or current treatment for low HDL cholesterol  Men <40 mg/dL (1 mmol/L) in men or current treatment for low HDL cholesterol     Fasting plasma glucose (FPG) Lab Results   Component Value Date     06/20/2025    GLC 80 10/02/2020      FPG >=100 mg/dL (5.6 mmol/L) or treatment for elevated blood glucose   Blood pressure  BP Readings from Last 3 Encounters:   07/15/25 116/75   06/21/25 121/77   03/06/25 (!) 142/86    Blood pressure >=130/85 mmHg or treatment for elevated blood pressure   Family History  See family history     Mental Status Exam:  Appearance: awake, alert and unkempt.  Attitude:  evasive, guarded, and somewhat cooperative  Eye Contact:  good  Mood: anxious  Affect:  mood congruent   Speech:  clear, coherent  Language: fluent and intact in English  Psychomotor, Gait, Musculoskeletal:  no evidence of tardive dyskinesia, dystonia, or tics  Throught Process:  disorganized and illogical  Associations:  no  loose associations  Thought Content:  no evidence of suicidal ideation or homicidal ideation and patient appears to be responding to internal stimuli  Insight:  limited  Judgement:  limited  Oriented to:  time, person, and place  Attention Span and Concentration:  fair  Recent and Remote Memory:  fair  Fund of Knowledge:  low-normal           Precautions:     Behavioral Orders   Procedures    Code 1 - Restrict to Unit    Routine Programming     As clinically indicated    Status 15     Every 15 minutes.    Suicide precautions: Suicide Risk: HIGH; Clinical rationale to override score: modification to the care environment     Patients on Suicide Precautions should have a Combination Diet ordered that includes a Diet selection(s) AND a Behavioral Tray selection for Safe Tray - with utensils, or Safe Tray - NO utensils       Suicide Risk:   HIGH     Clinical rationale to override score::   modification to the care environment          Diagnoses:     Schizophrenia, decompensated    Clinically Significant Risk Factors                                    # Financial/Environmental Concerns:                       Assessment & Plan:     Assessment and hospital summary:  30-year-old male brought to the emergency room by parents, decompensating with delusional statements and auditory hallucinations, noncompliant with meds. Declining Invega today and does not believe he has a mental illness. Family expressing concerns about safety. Will file for MI commitment and [facility] on 6/23.     Today's Changes:  None  May consider adding alternative neuroleptic if no substantial improvement within the next week    156 mg of Invega Sustenna due on 7/21    Target psychiatric symptoms and interventions:  Invega 9 mg daily (most recent increase was on 7/15)    Risks, benefits, and alternatives discussed at length with patient.     Acute Medical Problems and Treatments:  Acute medical concerns:  No acute medical concerns    Pertinent  labs/imaging:  Dyslipidemia noted on lipid profile dated 6/22  CBC with diff and CMP checked on 7/11 and are wnl    Behavioral/Psychological/Social:  - Encourage unit programming    Safety:  - Continue precautions as noted above  - Status 15 minute checks    Legal Status: MI civilly committed with [facility]    Disposition Plan   Reason for ongoing admission: is unable to care for self due to severe psychosis or neyda  Discharge location: IRTS  Discharge Medications: not ordered  Follow-up Appointments: not scheduled    Entered by: Jo-Ann Lainez MD on 07/16/2025  at 9:04 AM
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
## 2025-07-16 NOTE — PROGRESS NOTES
Rehab Group    Start time: 1615  End time: 1655  Patient time total: 40 minutes    attended full group    #4 attended   Group Type: recreation   Group Topic Covered: activity therapy, cognitive activities, and healthy leisure time       Group Session Detail:  TR leisure group     Patient Response/Contribution:  cooperative with task, attentive, and actively engaged       Patient Detail:  Pt participated in a structured Therapeutic Recreation group with a focus on leisure participation, stress reduction, and social engagement via a group game. Pt was familiar with the [employer] game and didn't need any assistance each turn. Pt was a slightly sociable with the others and remained appropriate with interactions.       Activity Therapy Per 15 min ()      Patient Active Problem List   Diagnosis    Encopresis    Urinary incontinence    Sprain of medial collateral ligament of knee    Auditory hallucination    Schizophrenia (H)    Suicide attempt (H)    Chronic paranoid schizophrenia (H)    Psychophysiological insomnia    Delusional thoughts (H)    Mood changes

## 2025-07-17 PROCEDURE — 250N000013 HC RX MED GY IP 250 OP 250 PS 637: Performed by: PSYCHIATRY & NEUROLOGY

## 2025-07-17 PROCEDURE — 99232 SBSQ HOSP IP/OBS MODERATE 35: CPT | Performed by: PSYCHIATRY & NEUROLOGY

## 2025-07-17 PROCEDURE — 124N000002 HC R&B MH UMMC

## 2025-07-17 RX ORDER — PALIPERIDONE 6 MG/1
12 TABLET, EXTENDED RELEASE ORAL DAILY
Status: DISPENSED | OUTPATIENT
Start: 2025-07-18

## 2025-07-17 RX ORDER — OLANZAPINE 10 MG/2ML
10 INJECTION, POWDER, FOR SOLUTION INTRAMUSCULAR DAILY
Status: ACTIVE | OUTPATIENT
Start: 2025-07-18

## 2025-07-17 RX ADMIN — PALIPERIDONE 9 MG: 6 TABLET, EXTENDED RELEASE ORAL at 08:01

## 2025-07-17 RX ADMIN — NICOTINE POLACRILEX 2 MG: 2 GUM, CHEWING ORAL at 09:15

## 2025-07-17 RX ADMIN — TRAZODONE HYDROCHLORIDE 50 MG: 50 TABLET ORAL at 21:09

## 2025-07-17 RX ADMIN — TRAZODONE HYDROCHLORIDE 50 MG: 50 TABLET ORAL at 20:01

## 2025-07-17 RX ADMIN — QUETIAPINE FUMARATE 100 MG: 50 TABLET ORAL at 20:01

## 2025-07-17 RX ADMIN — NICOTINE POLACRILEX 2 MG: 2 GUM, CHEWING ORAL at 13:28

## 2025-07-17 ASSESSMENT — ACTIVITIES OF DAILY LIVING (ADL)
ADLS_ACUITY_SCORE: 23
LAUNDRY: UNABLE TO COMPLETE
ADLS_ACUITY_SCORE: 23
ORAL_HYGIENE: INDEPENDENT
ADLS_ACUITY_SCORE: 23
HYGIENE/GROOMING: INDEPENDENT
DRESS: SCRUBS (BEHAVIORAL HEALTH);INDEPENDENT
ADLS_ACUITY_SCORE: 23

## 2025-07-17 NOTE — PLAN OF CARE
Team Note Due:  Monday     Assessment/Intervention/Current Symtoms and Care Coordination:  Chart review, + team.      Per team, continued delusions/hallucinations.  He will get his 2nd loading dose at 156 of Invega on Monday, then 234mg monthly though this hasn't seemed to help much so the doctor may start another antipsychotic.     I communicated with Pt's CM Sindi Simon, she plans to come visit tomorrow. Provided update.     I met with pt and he asked if any IRTS replied back. I shared I have not yet placed the referrals due to him not having a great day the other day. I stated they will be reviewing the notes, so after a couple more good days I will submit the referrals. He stated ok, then walked away and mumbled something. I did inform him that his commitment CM plans to come visit tomorrow. He does appear to be physically better today, his scrubs appeared clean.     Discharge Plan or Goal:  IRTS  Will need DWYER upon discharge     Barriers to Discharge:  Symptoms     Referral Status:  TBD     Legal Status:  MI commitment + Rios   [facility]: [facility]  File Number: [id number]   Start date: 7/11/25  Rios meds: Haldol, Prolixin, Clozaril, Risperdal, Zyprexa, Invega     PPS: Sindi Montes [phone]    Contacts (include MAURICE status):  Gilbert Serrato - Father (No MAURICE) Ph: [phone]      Upcoming Meetings and Dates/Important Information and next steps:  Coordinate care   Refer to IRTS [facility] IRTS, [facility] IRTS, [facility]S, [facility] IRTS, [facility] IRTS, [facility]S    * Pt has meds in discharge pharmacy that he will need to get before discharge *

## 2025-07-17 NOTE — PLAN OF CARE
Problem: Psychotic Signs/Symptoms  Goal: Improved Behavioral Control (Psychotic Signs/Symptoms)  Outcome: Progressing     Problem: Psychotic Signs/Symptoms  Goal: Improved Mood Symptoms (Psychotic Signs/Symptoms)  Intervention: Optimize Emotion and Mood  Recent Flowsheet Documentation  Taken 7/17/2025 0800 by Nedra Serrano RN  Diversional Activity: television   Goal Outcome Evaluation:    Plan of Care Reviewed With: patient      The patient exhibited a calm mood with a blunted affect. He was cooperative and pleasant. This morning, he maintained an excellent self-care routine, waking up and showering before quietly sitting in the lounge to watch TV while waiting for breakfast. He responded with brief answers when spoken to. The patient denied any suicidal ideation (SI) and reported no other mental health-related symptoms.    He paced the hallway freely and communicated his needs, such as asking for nicotine gum. He adhered to his medication regimen and remained easily visible throughout the day.His Provider modifie his Invega.
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
## 2025-07-17 NOTE — PLAN OF CARE
BEH IP Unit Acuity Rating Score (UARS)  Patient is given one point for every criteria they meet.    CRITERIA SCORING   On a 72 hour hold, court hold, committed, stay of commitment, or revocation. 1    Patient LOS on BEH unit exceeds 20 days. 1 LOS: 26   Patient under guardianship, 55+, otherwise medically complex, or under age 11. 0   Suicide ideation without relief of precipitating factors. 0   Current plan for suicide. 0   Current plan for homicide. 0   Imminent risk or actual attempt to seriously harm another without relief of factors precipitating the attempt. 0   Severe dysfunction in daily living (ex: complete neglect for self care, extreme disruption in vegetative function, extreme deterioration in social interactions). 1   Recent (last 7 days) or current physical aggression in the ED or on unit. 0   Restraints or seclusion episode in past 72 hours. 0   Recent (last 7 days) or current verbal aggression, agitation, yelling, etc., while in the ED or unit. 1   Active psychosis. 1   Need for constant or near constant redirection (from leaving, from others, etc).  0   Intrusive or disruptive behaviors. 0   Patient requires 3 or more hours of individualized nursing care per 8-hour shift (i.e. for ADLs, meds, therapeutic interventions). 0   TOTAL 5

## 2025-07-18 PROCEDURE — 250N000013 HC RX MED GY IP 250 OP 250 PS 637: Performed by: PSYCHIATRY & NEUROLOGY

## 2025-07-18 PROCEDURE — 97150 GROUP THERAPEUTIC PROCEDURES: CPT | Mod: GO

## 2025-07-18 PROCEDURE — 99231 SBSQ HOSP IP/OBS SF/LOW 25: CPT | Performed by: PSYCHIATRY & NEUROLOGY

## 2025-07-18 PROCEDURE — 124N000002 HC R&B MH UMMC

## 2025-07-18 RX ADMIN — PALIPERIDONE 12 MG: 6 TABLET, EXTENDED RELEASE ORAL at 08:18

## 2025-07-18 RX ADMIN — NICOTINE POLACRILEX 2 MG: 2 GUM, CHEWING ORAL at 11:25

## 2025-07-18 RX ADMIN — NICOTINE POLACRILEX 4 MG: 2 GUM, CHEWING ORAL at 09:29

## 2025-07-18 RX ADMIN — OLANZAPINE 10 MG: 10 TABLET, FILM COATED ORAL at 16:17

## 2025-07-18 RX ADMIN — NICOTINE POLACRILEX 4 MG: 2 GUM, CHEWING ORAL at 16:56

## 2025-07-18 ASSESSMENT — ACTIVITIES OF DAILY LIVING (ADL)
ORAL_HYGIENE: INDEPENDENT
ADLS_ACUITY_SCORE: 23
HYGIENE/GROOMING: INDEPENDENT
ADLS_ACUITY_SCORE: 23
LAUNDRY: UNABLE TO COMPLETE
DRESS: INDEPENDENT
DRESS: INDEPENDENT
ADLS_ACUITY_SCORE: 23
ORAL_HYGIENE: INDEPENDENT
ADLS_ACUITY_SCORE: 23
HYGIENE/GROOMING: INDEPENDENT
ADLS_ACUITY_SCORE: 23

## 2025-07-18 NOTE — CARE PLAN
Rehab Group    Start time: 1330   End time: 1430  Patient time total: 50 minutes    attended full group    #2 attended   Group Type: general health and coping and recreation   Group Topic Covered: activity therapy and coping skills     Group Session Detail:  The focus of today's group was leisure exploration and engagement. Patient engaged in a therapeutic game to encourage new learning, problem solving, focus, socialization, and cognitive wellness. This game encouraged cognitive skill building, such as: initiation, planning, organization, sequencing, and attention.       Patient Response/Contribution:  positive affect, cooperative with task, organized, attentive, and actively engaged     Patient Detail:    Patient remained an engaged, attentive, and cooperative group attendee.   While he only observed the leisure group in the morning in pod 1, he was willing to come to pod 2 when invited, and fully participated in the leisure group with writer and 1 other peer.   Caught on quickly, used an increasing amount of strategy as the session progressed.  Occasional social interaction with writer and peer, though he only focused on the game, while peer was much more conversational.      [id number] OT Group (2 or more in attendance)    Patient Active Problem List   Diagnosis    Encopresis    Urinary incontinence    Sprain of medial collateral ligament of knee    Auditory hallucination    Schizophrenia (H)    Suicide attempt (H)    Chronic paranoid schizophrenia (H)    Psychophysiological insomnia    Delusional thoughts (H)    Mood changes
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
## 2025-07-18 NOTE — PLAN OF CARE
Problem: Psychotic Signs/Symptoms  Goal: Improved Mood Symptoms (Psychotic Signs/Symptoms)  Outcome: Progressing  Intervention: Optimize Emotion and Mood  Recent Flowsheet Documentation  Taken 7/17/2025 1722 by Mireya Bloom RN  Diversional Activity: television   Goal Outcome Evaluation:    Plan of Care Reviewed With: patient      Pt was visible in the milieu. Pt watched TV. Pt also attended evening group. Pt kept to self. Did not interact with peers. Pt denies pain and psych symptoms including SI, HI, SIB, AVH. Pt has a neutral mood with a flat affect. Pt was calm and cooperative with nursing cares. No physical or behavioral concerns throughout the shift. There is an order for one time height and weight but pt is currently sleeping. It will be done later when pt is up.

## 2025-07-18 NOTE — PLAN OF CARE
Patient was observed sleeping at the start of the shift and appeared to have slept for 7 hours. No behavioral concerns were noted or reported throughout the night.       Problem: Sleep Disturbance  Goal: Adequate Sleep/Rest  Outcome: Progressing  Intervention: Promote Sleep/Rest  Recent Flowsheet Documentation  Taken 7/18/2025 0200 by Radha Moulton, RN  Sleep/Rest Enhancement: noise level reduced   Goal Outcome Evaluation:
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
## 2025-07-18 NOTE — CARE PLAN
07/18/25 0349   Observation Type   How often does the Patient require Staff intervention/redirection? occasionally   Harm Category unmindful harm   Unmindful Harm command hallucinations   high risk of acting on violent/sexual commands   Level of Special Monitoring none
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
## 2025-07-18 NOTE — PLAN OF CARE
BEH IP Unit Acuity Rating Score (UARS)  Patient is given one point for every criteria they meet.    CRITERIA SCORING   On a 72 hour hold, court hold, committed, stay of commitment, or revocation. 1    Patient LOS on BEH unit exceeds 20 days. 1 LOS: 27   Patient under guardianship, 55+, otherwise medically complex, or under age 11. 0   Suicide ideation without relief of precipitating factors. 0   Current plan for suicide. 0   Current plan for homicide. 0   Imminent risk or actual attempt to seriously harm another without relief of factors precipitating the attempt. 0   Severe dysfunction in daily living (ex: complete neglect for self care, extreme disruption in vegetative function, extreme deterioration in social interactions). 1   Recent (last 7 days) or current physical aggression in the ED or on unit. 0   Restraints or seclusion episode in past 72 hours. 0   Recent (last 7 days) or current verbal aggression, agitation, yelling, etc., while in the ED or unit. 1   Active psychosis. 1   Need for constant or near constant redirection (from leaving, from others, etc).  0   Intrusive or disruptive behaviors. 0   Patient requires 3 or more hours of individualized nursing care per 8-hour shift (i.e. for ADLs, meds, therapeutic interventions). 0   TOTAL 5

## 2025-07-19 PROCEDURE — 124N000002 HC R&B MH UMMC

## 2025-07-19 PROCEDURE — 250N000013 HC RX MED GY IP 250 OP 250 PS 637: Performed by: PSYCHIATRY & NEUROLOGY

## 2025-07-19 PROCEDURE — 97150 GROUP THERAPEUTIC PROCEDURES: CPT | Mod: GO

## 2025-07-19 RX ADMIN — PALIPERIDONE 12 MG: 6 TABLET, EXTENDED RELEASE ORAL at 08:25

## 2025-07-19 RX ADMIN — NICOTINE POLACRILEX 2 MG: 2 GUM, CHEWING ORAL at 08:56

## 2025-07-19 RX ADMIN — NICOTINE POLACRILEX 4 MG: 2 GUM, CHEWING ORAL at 17:11

## 2025-07-19 RX ADMIN — QUETIAPINE FUMARATE 100 MG: 50 TABLET ORAL at 20:10

## 2025-07-19 RX ADMIN — OLANZAPINE 10 MG: 10 TABLET, FILM COATED ORAL at 12:38

## 2025-07-19 RX ADMIN — TRAZODONE HYDROCHLORIDE 50 MG: 50 TABLET ORAL at 20:08

## 2025-07-19 ASSESSMENT — ACTIVITIES OF DAILY LIVING (ADL)
ADLS_ACUITY_SCORE: 23
HYGIENE/GROOMING: INDEPENDENT
ADLS_ACUITY_SCORE: 23
ORAL_HYGIENE: INDEPENDENT
ADLS_ACUITY_SCORE: 23
LAUNDRY: UNABLE TO COMPLETE
DRESS: SCRUBS (BEHAVIORAL HEALTH);INDEPENDENT
ADLS_ACUITY_SCORE: 23
DRESS: INDEPENDENT;SCRUBS (BEHAVIORAL HEALTH)
ADLS_ACUITY_SCORE: 23
ORAL_HYGIENE: INDEPENDENT
ADLS_ACUITY_SCORE: 23
HYGIENE/GROOMING: INDEPENDENT

## 2025-07-19 NOTE — CARE PLAN
Rehab Group    Start time: 1555  End time: 1640  Patient time total: 43 minutes    attended full group     #3 attended   Group Type: occupational therapy   Group Topic Covered: balanced lifestyle, cognitive activities, coping skills, educational support, healthy leisure time, self-esteem, and social skills       Group Session Detail:   Education and group discussion provided on the benefits of healthy leisure for recovery with hands on Hilario card activity for concentration, tracking, sequencing, strategic decision making, follow through, frustration tolerance, coping, mood stabilization, reality-based activity, healthy leisure exploration, an opportunity to experience success, and socialization.         Patient Response/Contribution:  actively engaged       Patient Detail:  Pt was pleasant upon approach, though a bit distracted during the activity.  Sometimes he did need v/c's for turn taking, especially if the direction of play was changing a lot.  Pt was able to participate in a strategic manner, demonstrating good understanding of the activity.          [id number] OT Group (2 or more in attendance)      Patient Active Problem List   Diagnosis    Encopresis    Urinary incontinence    Sprain of medial collateral ligament of knee    Auditory hallucination    Schizophrenia (H)    Suicide attempt (H)    Chronic paranoid schizophrenia (H)    Psychophysiological insomnia    Delusional thoughts (H)    Mood changes

## 2025-07-19 NOTE — PLAN OF CARE
Visible in milieu. Makes needs known in an appropriate manner. Inquiring about discharge process. No overtly delusional content voiced to writer during shift. Denies SI/HI, hallucinations.No appreciable insight into mental health demonstrated. Pt came and stated that he was agitation and requested PRN Zyprexa, which was given with apparent effectiveness, and no further complaints or signs of agitation. No scheduled medications this shift. No acute behavioral or safety concerns identified for this shift.     Denies pain, having any acute physical concerns at this time. No adverse effects of medication observed or reported.     /81   Pulse 108   Temp 98.5  F (36.9  C) (Temporal)   Resp 16   SpO2 98%

## 2025-07-19 NOTE — PLAN OF CARE
Problem: Sleep Disturbance  Goal: Adequate Sleep/Rest  Outcome: Progressing   Goal Outcome Evaluation:    Patient appeared to sleep 6 hours this night shift.  No prns or snacks given or requested.  No concerns were reported or noted.
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
## 2025-07-20 PROCEDURE — 250N000013 HC RX MED GY IP 250 OP 250 PS 637: Performed by: PSYCHIATRY & NEUROLOGY

## 2025-07-20 PROCEDURE — 124N000002 HC R&B MH UMMC

## 2025-07-20 RX ADMIN — TRAZODONE HYDROCHLORIDE 50 MG: 50 TABLET ORAL at 20:19

## 2025-07-20 RX ADMIN — NICOTINE POLACRILEX 4 MG: 2 GUM, CHEWING ORAL at 18:55

## 2025-07-20 RX ADMIN — NICOTINE POLACRILEX 2 MG: 2 GUM, CHEWING ORAL at 12:23

## 2025-07-20 RX ADMIN — NICOTINE POLACRILEX 4 MG: 2 GUM, CHEWING ORAL at 09:34

## 2025-07-20 RX ADMIN — PALIPERIDONE 12 MG: 6 TABLET, EXTENDED RELEASE ORAL at 07:54

## 2025-07-20 RX ADMIN — QUETIAPINE FUMARATE 100 MG: 50 TABLET ORAL at 20:19

## 2025-07-20 ASSESSMENT — ACTIVITIES OF DAILY LIVING (ADL)
ADLS_ACUITY_SCORE: 23
DRESS: INDEPENDENT;SCRUBS (BEHAVIORAL HEALTH)
ADLS_ACUITY_SCORE: 23
HYGIENE/GROOMING: INDEPENDENT
LAUNDRY: UNABLE TO COMPLETE
ADLS_ACUITY_SCORE: 23
ORAL_HYGIENE: INDEPENDENT
ADLS_ACUITY_SCORE: 23
HYGIENE/GROOMING: INDEPENDENT
ADLS_ACUITY_SCORE: 23
DRESS: SCRUBS (BEHAVIORAL HEALTH);INDEPENDENT
ADLS_ACUITY_SCORE: 23
ORAL_HYGIENE: INDEPENDENT
ADLS_ACUITY_SCORE: 23
LAUNDRY: UNABLE TO COMPLETE
ADLS_ACUITY_SCORE: 23
ADLS_ACUITY_SCORE: 23

## 2025-07-20 NOTE — PLAN OF CARE
"Calm, cooperative. Visible in milieu. Alert, oriented x 4. Good eye contact. Inquiring about discharge. No overtly psychotic statements made to writer. Denies SI/HI, A/VH. States that mood is \"fine.\" No acute behavioral or safety concerns. No scheduled medication this shift.     Denies pain, denies having any acute physical concerns. No adverse effects of medication observed or reported.     /78 (BP Location: Right arm)   Pulse 81   Temp 98.5  F (36.9  C) (Temporal)   Resp 17   SpO2 98%     "

## 2025-07-20 NOTE — PLAN OF CARE
Problem: Psychotic Signs/Symptoms  Goal: Improved Behavioral Control (Psychotic Signs/Symptoms)  7/20/2025 1852 by Simona Goodman, RN  Outcome: Progressing  7/20/2025 1044 by Simona Goodman, RN  Outcome: Progressing  [place] [facility] [place] this the shift. He was observed playing video game with peers and later watched a movie. He did not complain of pain and he did not talk about leeches being in his body. He ate dinner and after dinner went to his room. Writer did not hear any delusional statement from him about FBI or the police this shift. He denies all psych symptoms. Prn nicotine gum was given. Patient is overall calm with no aggression or agitation.     /81 (BP Location: Left arm)   Pulse 81   Temp 97.3  F (36.3  C) (Oral)   Resp 17   SpO2 97%

## 2025-07-20 NOTE — PLAN OF CARE
Problem: Sleep Disturbance  Goal: Adequate Sleep/Rest  Outcome: Progressing   Goal Outcome Evaluation:    Patient appeared to sleep 7 hours this night shift.  No prns or snacks given or requested.  No concerns were reported or noted.

## 2025-07-20 NOTE — PLAN OF CARE
Problem: Psychotic Signs/Symptoms  Goal: Improved Behavioral Control (Psychotic Signs/Symptoms)  Outcome: Progressing  Patient was visible in the lounge area for most of this shift. His affect is flat and mood is tense and irritable. He is compliant with vitals check and medication administration. He was observed interacting with peers playing video game. He did not complain of pain or leeches being in his body this shift. He took a shower and ate 100% of breakfast and lunch without issues. Patient is looking forward to discharge and he is hoping it happens soon. He denies feeling anxious, agitated, SI/SIB and HI. He is overall calm with no aggression or agitation. Prn nicotine gum was given.     /81 (BP Location: Left arm)   Pulse 73   Temp 98  F (36.7  C) (Temporal)   Resp 17   SpO2 96%

## 2025-07-21 PROCEDURE — 250N000013 HC RX MED GY IP 250 OP 250 PS 637: Performed by: PSYCHIATRY & NEUROLOGY

## 2025-07-21 PROCEDURE — 99231 SBSQ HOSP IP/OBS SF/LOW 25: CPT | Performed by: PSYCHIATRY & NEUROLOGY

## 2025-07-21 PROCEDURE — 124N000002 HC R&B MH UMMC

## 2025-07-21 PROCEDURE — 90853 GROUP PSYCHOTHERAPY: CPT | Performed by: COUNSELOR

## 2025-07-21 RX ADMIN — OLANZAPINE 10 MG: 10 TABLET, FILM COATED ORAL at 15:17

## 2025-07-21 RX ADMIN — NICOTINE POLACRILEX 2 MG: 2 GUM, CHEWING ORAL at 08:03

## 2025-07-21 RX ADMIN — NICOTINE POLACRILEX 4 MG: 2 GUM, CHEWING ORAL at 16:20

## 2025-07-21 RX ADMIN — PALIPERIDONE 12 MG: 6 TABLET, EXTENDED RELEASE ORAL at 08:03

## 2025-07-21 RX ADMIN — NICOTINE POLACRILEX 2 MG: 2 GUM, CHEWING ORAL at 11:33

## 2025-07-21 ASSESSMENT — ACTIVITIES OF DAILY LIVING (ADL)
ADLS_ACUITY_SCORE: 23
DRESS: STREET CLOTHES;SCRUBS (BEHAVIORAL HEALTH)
ADLS_ACUITY_SCORE: 23
ADLS_ACUITY_SCORE: 23
LAUNDRY: UNABLE TO COMPLETE
HYGIENE/GROOMING: INDEPENDENT;SHOWER
ADLS_ACUITY_SCORE: 23
ORAL_HYGIENE: INDEPENDENT;PROMPTS
ADLS_ACUITY_SCORE: 23
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
## 2025-07-21 NOTE — PLAN OF CARE
"Calm, cooperative. Visible in the milieu. Behaviorally appropriate. Speech is linear, logical superficially. Only when asked about previously expressed delusional content, does the pt speak delusionally. Pt states that he was \"fine with that,\" referring to the \"leeches\", but stated that they are \"real.\" Denies SI/HI, A/VH, states that mood is \"fine\". No acute behavioral or safety concerns noted this evening. No scheduled medications this shift.     Denied pain, denied having any acute physical concerns at this time. No adverse effects of medication observed or reported.     /66   Pulse 68   Temp 98  F (36.7  C) (Oral)   Resp 17   SpO2 97%       "

## 2025-07-21 NOTE — PLAN OF CARE
BEH IP Unit Acuity Rating Score (UARS)  Patient is given one point for every criteria they meet.    CRITERIA SCORING   On a 72 hour hold, court hold, committed, stay of commitment, or revocation. 1    Patient LOS on BEH unit exceeds 20 days. 1 LOS: 30   Patient under guardianship, 55+, otherwise medically complex, or under age 11. 0   Suicide ideation without relief of precipitating factors. 0   Current plan for suicide. 0   Current plan for homicide. 0   Imminent risk or actual attempt to seriously harm another without relief of factors precipitating the attempt. 0   Severe dysfunction in daily living (ex: complete neglect for self care, extreme disruption in vegetative function, extreme deterioration in social interactions). 0   Recent (last 7 days) or current physical aggression in the ED or on unit. 0   Restraints or seclusion episode in past 72 hours. 0   Recent (last 7 days) or current verbal aggression, agitation, yelling, etc., while in the ED or unit. 0   Active psychosis. 1   Need for constant or near constant redirection (from leaving, from others, etc).  0   Intrusive or disruptive behaviors. 0   Patient requires 3 or more hours of individualized nursing care per 8-hour shift (i.e. for ADLs, meds, therapeutic interventions). 0   TOTAL 3

## 2025-07-22 PROCEDURE — 250N000013 HC RX MED GY IP 250 OP 250 PS 637: Performed by: PSYCHIATRY & NEUROLOGY

## 2025-07-22 PROCEDURE — 99232 SBSQ HOSP IP/OBS MODERATE 35: CPT | Performed by: STUDENT IN AN ORGANIZED HEALTH CARE EDUCATION/TRAINING PROGRAM

## 2025-07-22 PROCEDURE — 124N000002 HC R&B MH UMMC

## 2025-07-22 RX ADMIN — NICOTINE POLACRILEX 4 MG: 2 GUM, CHEWING ORAL at 07:54

## 2025-07-22 RX ADMIN — TRAZODONE HYDROCHLORIDE 50 MG: 50 TABLET ORAL at 20:11

## 2025-07-22 RX ADMIN — NICOTINE POLACRILEX 4 MG: 2 GUM, CHEWING ORAL at 14:00

## 2025-07-22 RX ADMIN — PALIPERIDONE 12 MG: 6 TABLET, EXTENDED RELEASE ORAL at 07:55

## 2025-07-22 RX ADMIN — NICOTINE POLACRILEX 2 MG: 2 GUM, CHEWING ORAL at 20:40

## 2025-07-22 RX ADMIN — QUETIAPINE FUMARATE 100 MG: 50 TABLET ORAL at 20:10

## 2025-07-22 ASSESSMENT — ACTIVITIES OF DAILY LIVING (ADL)
LAUNDRY: UNABLE TO COMPLETE
ORAL_HYGIENE: INDEPENDENT
ADLS_ACUITY_SCORE: 23
HYGIENE/GROOMING: INDEPENDENT;SHOWER
ADLS_ACUITY_SCORE: 23
ADLS_ACUITY_SCORE: 23
DRESS: SCRUBS (BEHAVIORAL HEALTH)
ADLS_ACUITY_SCORE: 23
DRESS: SCRUBS (BEHAVIORAL HEALTH);INDEPENDENT
ADLS_ACUITY_SCORE: 23
HYGIENE/GROOMING: INDEPENDENT
ADLS_ACUITY_SCORE: 23
ADLS_ACUITY_SCORE: 23
LAUNDRY: UNABLE TO COMPLETE
ADLS_ACUITY_SCORE: 23
ORAL_HYGIENE: INDEPENDENT;PROMPTS
ADLS_ACUITY_SCORE: 23

## 2025-07-22 NOTE — PLAN OF CARE
BEH IP Unit Acuity Rating Score (UARS)  Patient is given one point for every criteria they meet.    CRITERIA SCORING   On a 72 hour hold, court hold, committed, stay of commitment, or revocation. 1    Patient LOS on BEH unit exceeds 20 days. 1 LOS: 31   Patient under guardianship, 55+, otherwise medically complex, or under age 11. 0   Suicide ideation without relief of precipitating factors. 0   Current plan for suicide. 0   Current plan for homicide. 0   Imminent risk or actual attempt to seriously harm another without relief of factors precipitating the attempt. 0   Severe dysfunction in daily living (ex: complete neglect for self care, extreme disruption in vegetative function, extreme deterioration in social interactions). 0   Recent (last 7 days) or current physical aggression in the ED or on unit. 0   Restraints or seclusion episode in past 72 hours. 0   Recent (last 7 days) or current verbal aggression, agitation, yelling, etc., while in the ED or unit. 0   Active psychosis. 1   Need for constant or near constant redirection (from leaving, from others, etc).  0   Intrusive or disruptive behaviors. 0   Patient requires 3 or more hours of individualized nursing care per 8-hour shift (i.e. for ADLs, meds, therapeutic interventions). 0   TOTAL 3

## 2025-07-22 NOTE — PLAN OF CARE
Problem: Sleep Disturbance  Goal: Adequate Sleep/Rest  Outcome: Progressing   Goal Outcome Evaluation:    Patient appeared to sleep 6.25 hours this night shift.  No prns or snacks given or requested.  No concerns were reported or noted.   to visit at some point today.

## 2025-07-22 NOTE — PLAN OF CARE
Problem: Psychotic Signs/Symptoms  Goal: Improved Behavioral Control (Psychotic Signs/Symptoms)  Outcome: Progressing  Patient was visible in the lounge area for most of the shift. Patient affect is flat and mood is calm. He did not attend group and he was not social with peers. He took a shower at the start of shift, and made his bed. He did not complain of pain and prn nicotine gum was given. He talked about leeches being in his body, but he did not perseverate on it. He continues to show lack of insight. He ate 100% of breakfast and lunch and he is compliant with medication administration and vitals check. Patient is overall calm with no aggression or agitation.     /75 (Patient Position: Sitting)   Pulse 91   Temp 97.7  F (36.5  C) (Temporal)   Resp 17   SpO2 97%

## 2025-07-22 NOTE — PROGRESS NOTES
"[facility], [place]   Psychiatric Progress Note  Hospital Day: 31        Interim History:   The patient's care was discussed with the treatment team during the daily team meeting and/or staff's chart notes were reviewed.     Sleep: 6.25 hours (07/22/25 0600)  Scheduled Medications: took all scheduled medications as prescribed   PRN medications:   Last 24H PRN:     nicotine (NICORETTE) gum 2-4 mg, 4 mg at 07/22/25 0754    OLANZapine (zyPREXA) tablet 10 mg, 10 mg at 07/21/25 1517 **OR** OLANZapine (zyPREXA) injection 10 mg no psychiatric PRNs given   Staff report: No acute safety concerns. See staff notes for additional details.      Today, Joo says that he is doing \"good\". He says that he slept well last night. When asked if he feels any different compared to when he first got to the hospital, he says \"my body is changing\". He is unable to elaborate on this and later says nothing is different and the medications haven't done anything to help him. He says that he has a skin tag in his mouth that keeps coming and going and that's where he had been injected with leeches. Denies any side effects from medication. Denies significant depressive or anxiety symptoms.       Suicidal ideation: denies current or recent suicidal ideation or behaviors.    Homicidal ideation: denies current or recent homicidal ideation or behaviors.    Psychotic symptoms:  None reported today    Medication side effects reported: No significant side effects. Denies constipation.     Acute medical concerns: yes, as above    Other issues reported by patient: Patient had no further questions or concerns.           Medications:     Current Facility-Administered Medications   Medication Dose Route Frequency Provider Last Rate Last Admin    paliperidone ER (INVEGA) 24 hr tablet 12 mg  12 mg Oral Daily Jo-Ann Lainez MD   12 mg at 07/22/25 0755    Or    OLANZapine (zyPREXA) injection 10 mg  10 mg Intramuscular " "Daily Jo-Ann Lainez MD              Allergies:     Allergies   Allergen Reactions    No Known Drug Allergy           Labs:   No results found for this or any previous visit (from the past 24 hours).       Psychiatric Examination:     /66   Pulse 68   Temp 98  F (36.7  C) (Oral)   Resp 17   SpO2 97%   Weight is 0 lbs 0 oz  There is no height or weight on file to calculate BMI.    Weight over time:  There were no vitals filed for this visit.    Orthostatic Vitals         Most Recent      Sitting Orthostatic /75 07/21 0844    Sitting Orthostatic Pulse (bpm) 97 07/21 0844       /75 (Patient Position: Sitting)   Pulse 91   Temp 97.7  F (36.5  C) (Temporal)   Resp 17   SpO2 97%   Weight is 0 lbs 0 oz  There is no height or weight on file to calculate BMI.    Weight over time:  Vitals:       Orthostatic Vitals         Most Recent      Sitting Orthostatic /75 07/21 0844    Sitting Orthostatic Pulse (bpm) 97 07/21 0844              Cardiometabolic risk assessment. 07/22/2025    Reviewed patient profile for cardiometabolic risk factors    Date taken / Value  REFERENCE RANGE   Abdominal Obesity  (Waist Circumference)   See nursing flowsheet Women >=35 in (88 cm)   Men >=40 in (102 cm)      Triglycerides  Triglycerides   Date Value Ref Range Status   06/22/2025 222 (H) <150 mg/dL Final      HDL cholesterol  Direct Measure HDL   Date Value Ref Range Status   06/22/2025 27 (L) >=40 mg/dL Final   ]   Fasting plasma glucose (FPG) Glucose   Date Value Ref Range Status   07/11/2025 96 70 - 99 mg/dL Final   10/02/2020 80 70 - 99 mg/dL Final      Blood pressure  Blood Pressure  07/22/25 : 106/75  06/21/25 : 121/77  03/06/25 : 142/86   Blood pressure >=130/85 mmHg or treatment for elevated blood pressure   Family History  See family history     Mental Status Exam:  Appearance: awake, alert and unkempt.  Attitude:  cooperative  Eye Contact:  intense  Mood: \"fine\"  Affect:  mood congruent, " blunted  Speech:  clear, coherent  Language: fluent and intact in English  Psychomotor, Gait, Musculoskeletal:  no evidence of tardive dyskinesia, dystonia, or tics  Throught Process:  disorganized and illogical though more goal oriented  Associations:  no loose associations  Thought Content:  no evidence of suicidal ideation or homicidal ideation and patient appears to be responding to internal stimuli  Insight:  limited  Judgement:  limited  Oriented to:  time, person, and place  Attention Span and Concentration:  fair  Recent and Remote Memory:  fair  Fund of Knowledge:  low-normal           Precautions:     Behavioral Orders   Procedures    Code 1 - Restrict to Unit    Routine Programming     As clinically indicated    Status 15     Every 15 minutes.    Suicide precautions: Suicide Risk: HIGH; Clinical rationale to override score: modification to the care environment     Patients on Suicide Precautions should have a Combination Diet ordered that includes a Diet selection(s) AND a Behavioral Tray selection for Safe Tray - with utensils, or Safe Tray - NO utensils       Suicide Risk:   HIGH     Clinical rationale to override score::   modification to the care environment          Diagnoses:     Schizophrenia, decompensated    Clinically Significant Risk Factors        # Financial/Environmental Concerns:               Assessment & Plan:     Assessment and hospital summary:  30-year-old male brought to the emergency room by parents, decompensating with delusional statements and auditory hallucinations, noncompliant with meds. Does not believe he has a mental illness. Family expressing concerns about safety. Filed for MI commitment and Rios on 6/23.     Pt now fully committed with Rios in place.     Today's Changes:  None    Medications:  - Paliperidone PO 12 mg daily (most recent increase was on 7/18)  - Invega Sustenna 156mg administered 7/21    Risks, benefits, and alternatives discussed at length with  patient.     Acute Medical Problems and Treatments:  Acute medical concerns:  No acute medical concerns    Pertinent labs/imaging:  Dyslipidemia noted on lipid profile dated 6/22  CBC with diff and CMP checked on 7/11 and are wnl    Behavioral/Psychological/Social:  - Encourage unit programming    Safety:  - Continue precautions as noted above  - Status 15 minute checks    Legal Status: MI civilly committed with Rios    Disposition Plan   Reason for ongoing admission: is unable to care for self due to severe psychosis or neyda  Discharge location: IRTS  Discharge Medications: not ordered  Follow-up Appointments: not scheduled         Gabrielle Lechuga MD, PhD  07/22/2025

## 2025-07-22 NOTE — PLAN OF CARE
Team Note Due:  Monday     Assessment/Intervention/Current Symtoms and Care Coordination:  Chart review, + team.      Per team, no change. No behavioral issues. Mostly isolative to room or withdrawn in milieu.     I met with pt today and shared his CM is coming shortly to meet with him. He stated ok and was observed tidying up his room. He is calm and pleasant. Pt met with his CM in his room.    I met with pt's commitment CM afterwards in ctc office and she shared he continues to ask her the same as he asks us, if he can prove the leeches are real then can he be off commitment and she shared that he would remain on commitment for 6 months. After meeting with CM he was observed pacing in his room.     Discharge Plan or Goal:  IRTS when more stable     Barriers to Discharge:  Symptoms     Referral Status:  TBD     Legal Status:  MI commitment + Rios   [facility]: [facility]  File Number: [id number]   Start date: 7/11/25  Rios meds: Haldol, Prolixin, Clozaril, Risperdal, Zyprexa, Invega     PPS: Sindi Montes [phone]    Contacts (include MAURICE status):  Gilbert Serrato - Father (No MAURICE) Ph: [phone]      Upcoming Meetings and Dates/Important Information and next steps:  Coordinate care   Pt will need PD and COS at discharge.   Update internal referral tracker.  Pt will need psychiatry.     CM coming 7/22 to visit Pt.     * Pt has meds in discharge pharmacy that he will need to get before discharge *

## 2025-07-22 NOTE — PLAN OF CARE
"Safety plan completed with Joo. Somewhat cooperative with process, no insight into mental health functioning. Stated his diagnosis he's receiving treatment for is \"fake schizophrenia.\" Showed me his gums and stated there's a chip implanted in them where a tooth is missing. Identified mom and dad as safe, supportive people. Reported no safety concerns upon discharge.   "

## 2025-07-22 NOTE — PLAN OF CARE
"  Problem: Psychotic Signs/Symptoms  Goal: Improved Mood Symptoms (Psychotic Signs/Symptoms)  Outcome: Progressing  Intervention: Optimize Emotion and Mood  Recent Flowsheet Documentation  Taken 7/22/2025 1729 by Mireya Bloom RN  Supportive Measures: active listening utilized  Diversional Activity:   television   play   Goal Outcome Evaluation:    Plan of Care Reviewed With: patient      Pt was out in the milieu most of the evening shift. Pt initiated and took a shower. Pt watch TV and attended evening group. Pt denies pain and psych symptoms, but pt continues to believe that there are leeches in his body.  Pt reported his mood as \" ok\". His affect is flat. Pt requested to know when he will be transferred to a different place. The plan is for him to get mentally better before any discharge plan. Pt kept to self. Pt did not interact with other peers. Pt has a good appetite and good hygiene. Pt was medications compliant. Pt denies any physical and behavioral concerns.          "

## 2025-07-23 PROCEDURE — 250N000013 HC RX MED GY IP 250 OP 250 PS 637: Performed by: PSYCHIATRY & NEUROLOGY

## 2025-07-23 PROCEDURE — 124N000002 HC R&B MH UMMC

## 2025-07-23 PROCEDURE — 99232 SBSQ HOSP IP/OBS MODERATE 35: CPT | Performed by: STUDENT IN AN ORGANIZED HEALTH CARE EDUCATION/TRAINING PROGRAM

## 2025-07-23 RX ADMIN — NICOTINE POLACRILEX 4 MG: 2 GUM, CHEWING ORAL at 11:33

## 2025-07-23 RX ADMIN — OLANZAPINE 10 MG: 10 TABLET, FILM COATED ORAL at 11:33

## 2025-07-23 RX ADMIN — QUETIAPINE FUMARATE 100 MG: 50 TABLET ORAL at 21:15

## 2025-07-23 RX ADMIN — NICOTINE POLACRILEX 2 MG: 2 GUM, CHEWING ORAL at 17:46

## 2025-07-23 RX ADMIN — NICOTINE POLACRILEX 4 MG: 2 GUM, CHEWING ORAL at 15:45

## 2025-07-23 RX ADMIN — NICOTINE POLACRILEX 4 MG: 2 GUM, CHEWING ORAL at 14:05

## 2025-07-23 RX ADMIN — PALIPERIDONE 12 MG: 6 TABLET, EXTENDED RELEASE ORAL at 07:49

## 2025-07-23 RX ADMIN — TRAZODONE HYDROCHLORIDE 50 MG: 50 TABLET ORAL at 21:15

## 2025-07-23 ASSESSMENT — ACTIVITIES OF DAILY LIVING (ADL)
ADLS_ACUITY_SCORE: 23
ADLS_ACUITY_SCORE: 23
ORAL_HYGIENE: INDEPENDENT
HYGIENE/GROOMING: INDEPENDENT
LAUNDRY: UNABLE TO COMPLETE
ADLS_ACUITY_SCORE: 23
DRESS: INDEPENDENT
ADLS_ACUITY_SCORE: 23

## 2025-07-23 NOTE — PLAN OF CARE
BEH IP Unit Acuity Rating Score (UARS)  Patient is given one point for every criteria they meet.    CRITERIA SCORING   On a 72 hour hold, court hold, committed, stay of commitment, or revocation. 1    Patient LOS on BEH unit exceeds 20 days. 1 LOS: 32   Patient under guardianship, 55+, otherwise medically complex, or under age 11. 0   Suicide ideation without relief of precipitating factors. 0   Current plan for suicide. 0   Current plan for homicide. 0   Imminent risk or actual attempt to seriously harm another without relief of factors precipitating the attempt. 0   Severe dysfunction in daily living (ex: complete neglect for self care, extreme disruption in vegetative function, extreme deterioration in social interactions). 0   Recent (last 7 days) or current physical aggression in the ED or on unit. 0   Restraints or seclusion episode in past 72 hours. 0   Recent (last 7 days) or current verbal aggression, agitation, yelling, etc., while in the ED or unit. 0   Active psychosis. 1   Need for constant or near constant redirection (from leaving, from others, etc).  0   Intrusive or disruptive behaviors. 0   Patient requires 3 or more hours of individualized nursing care per 8-hour shift (i.e. for ADLs, meds, therapeutic interventions). 0   TOTAL 3

## 2025-07-23 NOTE — PLAN OF CARE
Problem: Sleep Disturbance  Goal: Adequate Sleep/Rest  Outcome: Progressing   Goal Outcome Evaluation:    Patient slept 7 hours during the night. Safety checked was done every 15 minutes. No PRN was given. No concerned noted.
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
## 2025-07-23 NOTE — PLAN OF CARE
Team Note Due:  Monday     Assessment/Intervention/Current Symtoms and Care Coordination:  Chart review, + team.      Per team, no change. No behavioral issues.    This morning, pt requested prn for agitation. Provider may consider clozapine with the invega. Will wait for primary Psych to see.     I met with patient and he stated he was doing 'good'. He appeared well groomed. His sheets are still off the bed. He shared he slept well (7 hours per RN note). His room appears a bit tidier. I shared that I had no updates at this time but I would keep him updated. He  asked again what are we waiting for and I shared the providers want to make sure the meds are working prior to him discharging. He stated ok. He appeared cooperative and pleasant though flat affect. Mostly isolative to room. Continued limited insight.     Discharge Plan or Goal:  IRTS when more stable     Barriers to Discharge:  Symptoms     Referral Status:  TBD     Legal Status:  MI commitment + [facility]: [facility]  File Number: [id number]   Start date: 7/11/25  Rios meds: Haldol, Prolixin, Clozaril, Risperdal, Zyprexa, Invega     PPS: Sindi Montes [phone]    Contacts (include MAURICE status):  Gilbert Serrato - Father (No MAURICE) Ph: [phone]      Upcoming Meetings and Dates/Important Information and next steps:  Coordinate care   Pt will need PD and COS at discharge.   Update internal referral tracker.  Pt will need psychiatry.     Pt will need [facility] referrals placed once stable.    * Pt has meds in discharge pharmacy that he will need to get before discharge *

## 2025-07-24 VITALS
HEIGHT: 71 IN | BODY MASS INDEX: 41.52 KG/M2 | HEART RATE: 77 BPM | SYSTOLIC BLOOD PRESSURE: 104 MMHG | OXYGEN SATURATION: 98 % | DIASTOLIC BLOOD PRESSURE: 71 MMHG | RESPIRATION RATE: 16 BRPM | WEIGHT: 296.6 LBS | TEMPERATURE: 96.9 F

## 2025-07-24 PROCEDURE — 250N000013 HC RX MED GY IP 250 OP 250 PS 637: Performed by: PSYCHIATRY & NEUROLOGY

## 2025-07-24 PROCEDURE — 97150 GROUP THERAPEUTIC PROCEDURES: CPT | Mod: GO

## 2025-07-24 PROCEDURE — 99232 SBSQ HOSP IP/OBS MODERATE 35: CPT | Performed by: PSYCHIATRY & NEUROLOGY

## 2025-07-24 PROCEDURE — 124N000002 HC R&B MH UMMC

## 2025-07-24 PROCEDURE — 90853 GROUP PSYCHOTHERAPY: CPT

## 2025-07-24 RX ORDER — HALOPERIDOL 5 MG/ML
5 INJECTION INTRAMUSCULAR 2 TIMES DAILY
Status: ACTIVE | OUTPATIENT
Start: 2025-07-24

## 2025-07-24 RX ORDER — BENZTROPINE MESYLATE 0.5 MG/1
0.5 TABLET ORAL 2 TIMES DAILY
Status: DISPENSED | OUTPATIENT
Start: 2025-07-24

## 2025-07-24 RX ORDER — TRAZODONE HYDROCHLORIDE 100 MG/1
100 TABLET ORAL
Status: DISPENSED | OUTPATIENT
Start: 2025-07-24

## 2025-07-24 RX ORDER — HALOPERIDOL 5 MG/1
5 TABLET ORAL 2 TIMES DAILY
Status: DISPENSED | OUTPATIENT
Start: 2025-07-24

## 2025-07-24 RX ADMIN — BENZTROPINE MESYLATE 0.5 MG: 0.5 TABLET ORAL at 20:07

## 2025-07-24 RX ADMIN — PALIPERIDONE 12 MG: 6 TABLET, EXTENDED RELEASE ORAL at 08:15

## 2025-07-24 RX ADMIN — HALOPERIDOL 5 MG: 5 TABLET ORAL at 20:07

## 2025-07-24 RX ADMIN — NICOTINE POLACRILEX 4 MG: 2 GUM, CHEWING ORAL at 08:17

## 2025-07-24 RX ADMIN — TRAZODONE HYDROCHLORIDE 100 MG: 100 TABLET ORAL at 20:07

## 2025-07-24 RX ADMIN — NICOTINE POLACRILEX 4 MG: 2 GUM, CHEWING ORAL at 17:59

## 2025-07-24 RX ADMIN — NICOTINE POLACRILEX 4 MG: 2 GUM, CHEWING ORAL at 13:34

## 2025-07-24 ASSESSMENT — ACTIVITIES OF DAILY LIVING (ADL)
ADLS_ACUITY_SCORE: 33
ADLS_ACUITY_SCORE: 33
ADLS_ACUITY_SCORE: 23
HYGIENE/GROOMING: INDEPENDENT
ADLS_ACUITY_SCORE: 23
ADLS_ACUITY_SCORE: 23
ORAL_HYGIENE: INDEPENDENT
ADLS_ACUITY_SCORE: 23
ADLS_ACUITY_SCORE: 33
ADLS_ACUITY_SCORE: 23
ADLS_ACUITY_SCORE: 23
ADLS_ACUITY_SCORE: 33
ADLS_ACUITY_SCORE: 23
DRESS: INDEPENDENT
HYGIENE/GROOMING: INDEPENDENT;PROMPTS
ORAL_HYGIENE: INDEPENDENT;PROMPTS
ADLS_ACUITY_SCORE: 23
ADLS_ACUITY_SCORE: 23
LAUNDRY: WITH SUPERVISION
ADLS_ACUITY_SCORE: 23
ADLS_ACUITY_SCORE: 23
DRESS: SCRUBS (BEHAVIORAL HEALTH)
ADLS_ACUITY_SCORE: 33
LAUNDRY: UNABLE TO COMPLETE
ADLS_ACUITY_SCORE: 33
ADLS_ACUITY_SCORE: 23
ADLS_ACUITY_SCORE: 33
ADLS_ACUITY_SCORE: 23

## 2025-07-24 NOTE — PLAN OF CARE
"\"The leech is still there but I can't feel it right now. Maybe it's dead.\"  \"I know I'm still being monitored by the FBI and the police. What are they going to do to me next time?\"  \"The neurolink is in my tooth.\"  \"I know I have to do what they say.\" (Referring to his command hallucinations.)  \"Medicine can't do anything about what happens to me.\"  \"Did they call her? My mother is waiting for someone from here to call her.\"  \"I'm not mentally ill. You know that.\"    Good eye contact but distracted at times. Off and on tense affect congruent with stated thought content.  Continuing delusional thought content: leeches in his body as well as paranoid delusions about the FBI monitoring and wishing to harm him.  Continues to express frustration about his hospitalisation and is adamant about not wanting or needing psychotropic medications and not being mentally ill. Not amenable to do reality testing.Limited, selective socialization.     Frustrated with his length of stay.    Nonetheless he requests Trazodone and Seroquel nightly.     Denies side effects from medication.     Signed MAURICE for mother and a message was left for her, inviting her to call.     Appearance: clean in food stained clothing  Body Language:cooperative  Attitude:cooperative  Mood:neutral to defensive  Affect:blunted  Thought Process:linear  Thought content:some delusional, paranoid content  Perceptions:altered by delusions  Suicidal/homicidal:denies/denies  Knowledge,Insight,Judgement:intact/impaired/impaired  Attention/Concentration:fair/fair  Memory:Recent-intact                Remote-intact  Speech:WNL volume. Rambling at times  Cognition:Oriented x3. Not oriented to circumstance  Psychomotor: No abnormal body movements  Sleep/Activity level:  no day/evening sleeping  Motivation: For discharge only     Plan: Monitor and document mood and behaviour, thought process and content. Establish and maintain therapeutic relationship. Educate about " diagnoses, medications, treatment, legal status, plan of care. Address preexisting and concurrent medical concerns.      P:disturbed thought process  G:rational thought process  O:not progressing

## 2025-07-24 NOTE — PLAN OF CARE
"Patient out in the lounge area chatting with a peer. Agreeable to join for afternoon group. A few minutes later however, the lounge was shut down d/t a disruptive peer becoming more irritable. Asked writer \"why is he always mad like that?\" Referring to peer JF.     Will continue to invite and encourage to join group as appropriate.   "

## 2025-07-24 NOTE — PLAN OF CARE
Joo appeared sleeping for 7  hours. Respirations are even and unlabored.  No discomfort or pain verbalized   No harm/assault  to self and others noted or reported this shift.   No SI/HI, delusions, or hallucination noted or reported this shift  No aggressive behavior exhibited this shift.         Problem: Suicide Risk  Goal: Absence of Self-Harm  Outcome: Progressing     Problem: Psychotic Signs/Symptoms  Goal: Improved Sleep (Psychotic Signs/Symptoms)  Intervention: Promote Healthy Sleep Hygiene  Recent Flowsheet Documentation  Taken 7/24/2025 0626 by Lois Hernandez, RN  Sleep Hygiene Promotion:   awakenings minimized   room lighting adjusted   Goal Outcome Evaluation:

## 2025-07-24 NOTE — PLAN OF CARE
BEH IP Unit Acuity Rating Score (UARS)  Patient is given one point for every criteria they meet.    CRITERIA SCORING   On a 72 hour hold, court hold, committed, stay of commitment, or revocation. 1    Patient LOS on BEH unit exceeds 20 days. 1 LOS: 33   Patient under guardianship, 55+, otherwise medically complex, or under age 11. 0   Suicide ideation without relief of precipitating factors. 0   Current plan for suicide. 0   Current plan for homicide. 0   Imminent risk or actual attempt to seriously harm another without relief of factors precipitating the attempt. 0   Severe dysfunction in daily living (ex: complete neglect for self care, extreme disruption in vegetative function, extreme deterioration in social interactions). 0   Recent (last 7 days) or current physical aggression in the ED or on unit. 0   Restraints or seclusion episode in past 72 hours. 0   Recent (last 7 days) or current verbal aggression, agitation, yelling, etc., while in the ED or unit. 0   Active psychosis. 1   Need for constant or near constant redirection (from leaving, from others, etc).  0   Intrusive or disruptive behaviors. 0   Patient requires 3 or more hours of individualized nursing care per 8-hour shift (i.e. for ADLs, meds, therapeutic interventions). 0   TOTAL 3

## 2025-07-24 NOTE — PLAN OF CARE
"Problem: Psychotic Signs/Symptoms  Goal: Improved Behavioral Control (Psychotic Signs/Symptoms)  Outcome: Progressing  Patient was visible in the lounge area at the start of shift, alert and oriented x3, calm and compliant with vitals check and medication administration. He spent most of this shift in the lounge area and writer did not hear any comment about the FBI or police while patient was in the lounge this shift. He told writer that he is ready to leave and that he feels better than when he first arrived. He wants to know when he is leaving and if he will be able to get the DWYER outpatient so he can leave and not have to be at the hospital till the next injection. He did not complain of pain and prn nicotine gum was given. He ate 100% of breakfast and lunch in the lounge area without issues. Patient took a shower at the start of shift and he looks well groomed.     /71   Pulse 77   Temp 96.9  F (36.1  C) (Temporal)   Resp 16   Ht 1.803 m (5' 11\")   Wt 134.5 kg (296 lb 9.6 oz)   SpO2 98%   BMI 41.37 kg/m       "
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
## 2025-07-24 NOTE — PLAN OF CARE
"  Rehab Group    Start time: 1030  End time: 1200  Patient time total: 18 minutes    attended partial group     #3 attended   Group Type: occupational therapy   Group Topic Covered: balanced lifestyle, cognitive activities, coping skills, healthy leisure time, mindfulness, relaxation , and social skills     Group Session Detail:    Patient Response: Pt partcipated in group focused on leisure participation and exploration, socialization and cognitive challenge. Discussed how participation in leisure activities can be used as a healthy coping skill in symptom management and a strategy to reduce stress.    Cognition: Goal directed      Mood/Affect: Pleasant       Plan: Patient encouraged to maintain attendance for continued ongoing support in working towards occupational therapy goals to support overall treatment/care.        Patient Detail:    Joined at start of group with invitation from writer after having received encouragement from another peer to join the group activity (Judi). Was an active participant for the duration of time in group. Was social off and on with both writer and peers regarding the activity. Was familiar with the activity, therefore did not require any additional cues or prompts to follow along with the rules nor for turn taking. Participated until was pulled to meet with provider. After meeting patient appeared more irritable and shook head \"no\" to writer asking if patient wanted to rejoin for rest of the activity.       [id number] OT Group (2 or more in attendance)    Patient Active Problem List   Diagnosis    Encopresis    Urinary incontinence    Sprain of medial collateral ligament of knee    Auditory hallucination    Schizophrenia (H)    Suicide attempt (H)    Chronic paranoid schizophrenia (H)    Psychophysiological insomnia    Delusional thoughts (H)    Mood changes      "

## 2025-07-24 NOTE — PLAN OF CARE
Team Note Due:  Monday     Assessment/Intervention/Current Symtoms and Care Coordination:  Chart review, + team.      Per team, no change. No behavioral issues. Provider discussed possibly starting another antipsychotic medication.         Discharge Plan or Goal:  IRTS when more stable     Barriers to Discharge:  Symptoms     Referral Status:  TBD     Legal Status:  MI commitment + [facility]   [place]: [place]  File Number: [id number]   Start date: 7/11/25  [facility] meds: Haldol, Prolixin, Clozaril, Risperdal, Zyprexa, Invega     PPS: Sindi Montes [phone]    Contacts (include MAURICE status):  Gilbert Serrato - Father (No MAURICE) Ph: [phone]      Upcoming Meetings and Dates/Important Information and next steps:  Coordinate care   Pt will need PD and COS at discharge.   Update internal referral tracker.  Pt will need psychiatry.     Pt will need IRTS referrals placed once stable.    * Pt has meds in discharge pharmacy that he will need to get before discharge *

## 2025-07-25 PROCEDURE — 99232 SBSQ HOSP IP/OBS MODERATE 35: CPT | Performed by: STUDENT IN AN ORGANIZED HEALTH CARE EDUCATION/TRAINING PROGRAM

## 2025-07-25 PROCEDURE — 124N000002 HC R&B MH UMMC

## 2025-07-25 PROCEDURE — 90853 GROUP PSYCHOTHERAPY: CPT

## 2025-07-25 PROCEDURE — 250N000013 HC RX MED GY IP 250 OP 250 PS 637: Performed by: STUDENT IN AN ORGANIZED HEALTH CARE EDUCATION/TRAINING PROGRAM

## 2025-07-25 PROCEDURE — 250N000013 HC RX MED GY IP 250 OP 250 PS 637: Performed by: PSYCHIATRY & NEUROLOGY

## 2025-07-25 RX ORDER — HALOPERIDOL 10 MG/1
10 TABLET ORAL AT BEDTIME
Status: DISCONTINUED | OUTPATIENT
Start: 2025-07-25 | End: 2025-08-04 | Stop reason: HOSPADM

## 2025-07-25 RX ORDER — HALOPERIDOL 5 MG/1
5 TABLET ORAL DAILY
Status: DISCONTINUED | OUTPATIENT
Start: 2025-07-26 | End: 2025-08-04 | Stop reason: HOSPADM

## 2025-07-25 RX ORDER — HALOPERIDOL 5 MG/ML
10 INJECTION INTRAMUSCULAR AT BEDTIME
Status: DISCONTINUED | OUTPATIENT
Start: 2025-07-25 | End: 2025-08-04 | Stop reason: HOSPADM

## 2025-07-25 RX ORDER — HALOPERIDOL 5 MG/ML
5 INJECTION INTRAMUSCULAR DAILY
Status: DISCONTINUED | OUTPATIENT
Start: 2025-07-26 | End: 2025-08-04 | Stop reason: HOSPADM

## 2025-07-25 RX ADMIN — BENZTROPINE MESYLATE 0.5 MG: 0.5 TABLET ORAL at 20:37

## 2025-07-25 RX ADMIN — NICOTINE POLACRILEX 4 MG: 2 GUM, CHEWING ORAL at 09:34

## 2025-07-25 RX ADMIN — NICOTINE POLACRILEX 4 MG: 2 GUM, CHEWING ORAL at 14:12

## 2025-07-25 RX ADMIN — BENZTROPINE MESYLATE 0.5 MG: 0.5 TABLET ORAL at 08:03

## 2025-07-25 RX ADMIN — NICOTINE POLACRILEX 4 MG: 2 GUM, CHEWING ORAL at 16:13

## 2025-07-25 RX ADMIN — OLANZAPINE 10 MG: 10 TABLET, FILM COATED ORAL at 17:12

## 2025-07-25 RX ADMIN — PALIPERIDONE 12 MG: 6 TABLET, EXTENDED RELEASE ORAL at 08:03

## 2025-07-25 RX ADMIN — HALOPERIDOL 5 MG: 5 TABLET ORAL at 08:03

## 2025-07-25 RX ADMIN — HALOPERIDOL 10 MG: 10 TABLET ORAL at 20:36

## 2025-07-25 ASSESSMENT — ACTIVITIES OF DAILY LIVING (ADL)
ADLS_ACUITY_SCORE: 23
ORAL_HYGIENE: INDEPENDENT
ADLS_ACUITY_SCORE: 23
HYGIENE/GROOMING: INDEPENDENT
ADLS_ACUITY_SCORE: 23
ADLS_ACUITY_SCORE: 23
LAUNDRY: UNABLE TO COMPLETE
ADLS_ACUITY_SCORE: 23
ADLS_ACUITY_SCORE: 23
DRESS: INDEPENDENT
ADLS_ACUITY_SCORE: 23
ADLS_ACUITY_SCORE: 23
HYGIENE/GROOMING: INDEPENDENT;HANDWASHING
ADLS_ACUITY_SCORE: 23

## 2025-07-25 NOTE — PLAN OF CARE
Joo appeared sleeping for 6.25 hours without distress,breathing was quiet and unlabored.  No pain or discomfort noted or verbalized  No aggressive  behaviors demonstrated  No SI/HI, delusions, or hallucination noted or reported this shift    Problem: Suicide Risk  Goal: Absence of Self-Harm  Outcome: Progressing     Problem: Psychotic Signs/Symptoms  Goal: Optimal Cognitive Function (Psychotic Signs/Symptoms)  Outcome: Progressing  Goal: Improved Sleep (Psychotic Signs/Symptoms)  Intervention: Promote Healthy Sleep Hygiene  Recent Flowsheet Documentation  Taken 7/25/2025 0134 by Lois Hernandez, RN  Sleep Hygiene Promotion:   awakenings minimized   room lighting adjusted   Goal Outcome Evaluation:

## 2025-07-25 NOTE — PLAN OF CARE
Team Note Due:  Monday     Assessment/Intervention/Current Symtoms and Care Coordination:  Chart review, + team.      Per team, no change. No behavioral issues. Provider discussed possibly starting another antipsychotic medication. He seems preoccupied, paranoid, suspicious of surroundings.     Providers have asked him about Clozaril and he isn't wanting to start this. He just started Haldol yesterday to see if we see any improvement.     Still needs more time to stabilize, will increase Haldol.     I met with patient and shared the plan is to increase his Haldol prior to discharge, and encouraged him to take it day by day. He asked 'how long' and I shared I did not know a timeline. I encouraged him to speak to his doctor about the timeline of increasing the Haldol. He also requested his nurse to talk to her about his medications. Pt was cooperative though appeared to have a flat affect, averted eye contact. He also asked if I heard from his mom yet, (as the other day he mentioned if his mom could give proof about the leeches then he thinks he could discharge). I shared I had not heard from his mom but that the plan is to increase Haldol right now.     Discharge Plan or Goal:  IRTS when more stable     Barriers to Discharge:  Symptoms     Referral Status:  TBD     Legal Status:  MI commitment + [facility]  File Number: [id number]   Start date: 7/11/25  [facility] meds: Haldol, Prolixin, Clozaril, Risperdal, Zyprexa, Invega     PPS: Sindi Montes [phone]    Contacts (include MAURICE status):  Gilbert Serrato - Father (No MAURICE) Ph: [phone]      Upcoming Meetings and Dates/Important Information and next steps:  Coordinate care   Pt will need PD and COS at discharge.   Update internal referral tracker.  Pt will need psychiatry.     Pt will need IRTS referrals placed once stable.    * Pt has meds in discharge pharmacy that he will need to get before discharge *

## 2025-07-25 NOTE — PLAN OF CARE
Group Attendance:  attended full group    Time session began: 1430  Time session ended: 1457  Patient's total time in group: 27    Total # Attendees   5   Group Type psychotherapeutic     Group Topic Covered emotional regulation, insight improvement, and healthy coping skills     Group Session Detail Participants watched an informative video explaining polyvagal theory as it relates to trauma. A group discussion was facilitated to further promote learning in this area and foster community.      Patient's response to the group topic/interactions:  engaged socially when prompted     Patient Details: This is the first group Joo has joined with this writer. He engaged when prompted and shared that his parents are people he co-regulates with. He identified exercising as a way he used to self-regulate, but stated this has been more difficult with work recently.          14539 - Group psychotherapy - 1 Session  Patient Active Problem List   Diagnosis    Encopresis    Urinary incontinence    Sprain of medial collateral ligament of knee    Auditory hallucination    Schizophrenia (H)    Suicide attempt (H)    Chronic paranoid schizophrenia (H)    Psychophysiological insomnia    Delusional thoughts (H)    Mood changes

## 2025-07-25 NOTE — PLAN OF CARE
Pt mood was calm and cooperative and presented with a blunted affect. Pt was seen socializing with peers and attending group meetings. He denies all mental health symptoms, including SI and AVH. Pt did have pressured speech sometimes having the writer ask him to repeat what he was saying. He talked of a medication that was either to be started as discussed by the provider, or he said something like he was not satisfied with the medication. However, he is medication compliant. Had a PRN Trazodone at . No new concerns this shift.     Problem: Adult Behavioral Health Plan of Care  Goal: Optimized Coping Skills in Response to Life Stressors  Outcome: Progressing  Intervention: Promote Effective Coping Strategies  Recent Flowsheet Documentation  Taken 7/24/2025 1730 by Nasir Hagan RN  Supportive Measures:   active listening utilized   self-care encouraged   Goal Outcome Evaluation:    Plan of Care Reviewed With: patient

## 2025-07-25 NOTE — PLAN OF CARE
BEH IP Unit Acuity Rating Score (UARS)  Patient is given one point for every criteria they meet.    CRITERIA SCORING   On a 72 hour hold, court hold, committed, stay of commitment, or revocation. 1    Patient LOS on BEH unit exceeds 20 days. 1 LOS: 34   Patient under guardianship, 55+, otherwise medically complex, or under age 11. 0   Suicide ideation without relief of precipitating factors. 0   Current plan for suicide. 0   Current plan for homicide. 0   Imminent risk or actual attempt to seriously harm another without relief of factors precipitating the attempt. 0   Severe dysfunction in daily living (ex: complete neglect for self care, extreme disruption in vegetative function, extreme deterioration in social interactions). 0   Recent (last 7 days) or current physical aggression in the ED or on unit. 0   Restraints or seclusion episode in past 72 hours. 0   Recent (last 7 days) or current verbal aggression, agitation, yelling, etc., while in the ED or unit. 0   Active psychosis. 1   Need for constant or near constant redirection (from leaving, from others, etc).  0   Intrusive or disruptive behaviors. 0   Patient requires 3 or more hours of individualized nursing care per 8-hour shift (i.e. for ADLs, meds, therapeutic interventions). 0   TOTAL 3

## 2025-07-25 NOTE — PLAN OF CARE
Problem: Adult Behavioral Health Plan of Care  Goal: Plan of Care Review  Outcome: Progressing  Flowsheets  Taken 7/25/2025 1422  Plan of Care Reviewed With: patient  Taken 7/25/2025 1300  Patient Agreement with Plan of Care: agrees  Goal: Optimized Coping Skills in Response to Life Stressors  Outcome: Progressing     Problem: Psychotic Signs/Symptoms  Goal: Enhanced Social, Occupational or Functional Skills (Psychotic Signs/Symptoms)  Outcome: Progressing     Problem: Sleep Disturbance  Goal: Adequate Sleep/Rest  Outcome: Progressing   Goal Outcome Evaluation:    Plan of Care Reviewed With: patient Plan of Care Reviewed With: patient      Patient was alert &oriented X4,calm and cooperative.Patient was visible in the milieu the majority of this shift.Patient interacted with his peers and staffs.Patient was medications compliant and had good appetite.Patient denied having SI,HI,Hallucinations,anxiety and pain.No PRNs were given on this shift.No safety concerns.Will continue to monitor.

## 2025-07-25 NOTE — PROGRESS NOTES
"[facility], [place]   Psychiatric Progress Note  Hospital Day: 34        Interim History:   The patient's care was discussed with the treatment team during the daily team meeting and/or staff's chart notes were reviewed.     Sleep: 6.25 hours (07/25/25 0626)  Scheduled Medications: took all scheduled medications as prescribed   PRN medications:   Last 24H PRN:     nicotine (NICORETTE) gum 2-4 mg, 4 mg at 07/25/25 1613    traZODone (DESYREL) tablet 100 mg, 100 mg at 07/24/25 2007   Staff report: No acute safety concerns. See staff notes for additional details.    Today, Joo says he is doing \"fine\". He says there's nothing new that he wants to talk about. He says that when he got to the hospital he was manic but he's feeling better now. He was impulsive and had trouble sitting still. He denies feeling paranoid when he has neyda. When asked about what is going on with the police and the FBI, he says that he is okay with waiting to get it all figured out after he leaves the hospital. He is tolerating Haldol well so far and asks if we think we should increase the dose. He is open to taking 10mg at bedtime. He denies noticing any changes in his thought process or any side effects from starting it.     Suicidal ideation: denies current or recent suicidal ideation or behaviors.    Homicidal ideation: denies current or recent homicidal ideation or behaviors.    Psychotic symptoms:  As above    Medication side effects reported: No significant side effects. Denies constipation.     Acute medical concerns: yes, as above    Other issues reported by patient: Patient had no further questions or concerns.           Medications:     Current Facility-Administered Medications   Medication Dose Route Frequency Provider Last Rate Last Admin    benztropine (COGENTIN) tablet 0.5 mg  0.5 mg Oral BID Jo-Ann Lainez MD   0.5 mg at 07/25/25 0803    haloperidol (HALDOL) tablet 5 mg  5 mg Oral BID " "Jo-Ann Lainez MD   5 mg at 07/25/25 0803    Or    haloperidol lactate (HALDOL) injection 5 mg  5 mg Intramuscular BID Jo-Ann Lainez MD        paliperidone ER (INVEGA) 24 hr tablet 12 mg  12 mg Oral Daily Jo-Ann Lainez MD   12 mg at 07/25/25 0803    Or    OLANZapine (zyPREXA) injection 10 mg  10 mg Intramuscular Daily Jo-Ann Lainez MD              Allergies:     Allergies   Allergen Reactions    No Known Drug Allergy           Labs:   No results found for this or any previous visit (from the past 24 hours).       Psychiatric Examination:     /66   Pulse 68   Temp 98  F (36.7  C) (Oral)   Resp 17   SpO2 97%   Weight is 0 lbs 0 oz  There is no height or weight on file to calculate BMI.    Weight over time:  There were no vitals filed for this visit.    Orthostatic Vitals         Most Recent      Sitting Orthostatic /75 07/21 0844    Sitting Orthostatic Pulse (bpm) 97 07/21 0844       /75   Pulse 71   Temp 97.7  F (36.5  C) (Oral)   Resp 16   Ht 1.803 m (5' 11\")   Wt 134.5 kg (296 lb 9.6 oz)   SpO2 99%   BMI 41.37 kg/m    Weight is 296 lbs 9.6 oz  Body mass index is 41.37 kg/m .    Weight over time:  Vitals:    07/22/25 1635   Weight: 134.5 kg (296 lb 9.6 oz)       Orthostatic Vitals         Most Recent      Sitting Orthostatic /75 07/21 0844    Sitting Orthostatic Pulse (bpm) 97 07/21 0844              Cardiometabolic risk assessment. 07/25/2025    Reviewed patient profile for cardiometabolic risk factors    Date taken / Value  REFERENCE RANGE   Abdominal Obesity  (Waist Circumference)   See nursing flowsheet Women >=35 in (88 cm)   Men >=40 in (102 cm)      Triglycerides  Triglycerides   Date Value Ref Range Status   06/22/2025 222 (H) <150 mg/dL Final      HDL cholesterol  Direct Measure HDL   Date Value Ref Range Status   06/22/2025 27 (L) >=40 mg/dL Final   ]   Fasting plasma glucose (FPG) Glucose   Date Value Ref Range Status   07/11/2025 96 70 " "- 99 mg/dL Final   10/02/2020 80 70 - 99 mg/dL Final      Blood pressure  Blood Pressure  07/25/25 : 110/75  06/21/25 : 121/77  03/06/25 : 142/86   Blood pressure >=130/85 mmHg or treatment for elevated blood pressure   Family History  See family history     Mental Status Exam:  Appearance: awake, alert and unkempt.  Attitude:  cooperative  Eye Contact:  intense  Mood: angry  Affect:  mood congruent, heightened intensity  Speech:  clear, coherent  Language: fluent and intact in English  Psychomotor, Gait, Musculoskeletal:  no evidence of tardive dyskinesia, dystonia, or tics  Throught Process:  disorganized and illogical though more goal oriented  Associations:  no loose associations  Thought Content:  no evidence of suicidal ideation or homicidal ideation and patient appears to be responding to internal stimuli. Profound delusional ideas expressed  Insight:  limited  Judgement:  limited  Oriented to:  time, person, and place  Attention Span and Concentration:  fair  Recent and Remote Memory:  fair  Fund of Knowledge:  low-normal           Precautions:     Behavioral Orders   Procedures    Code 1 - Restrict to Unit    Routine Programming     As clinically indicated    Status 15     Every 15 minutes.    Suicide precautions: Suicide Risk: HIGH; Clinical rationale to override score: modification to the care environment     Patients on Suicide Precautions should have a Combination Diet ordered that includes a Diet selection(s) AND a Behavioral Tray selection for Safe Tray - with utensils, or Safe Tray - NO utensils       Suicide Risk:   HIGH     Clinical rationale to override score::   modification to the care environment          Diagnoses:     Schizophrenia, decompensated    Clinically Significant Risk Factors    # Morbid Obesity: Estimated body mass index is 41.37 kg/m  as calculated from the following:    Height as of this encounter: 1.803 m (5' 11\").    Weight as of this encounter: 134.5 kg (296 lb 9.6 oz).      # " Financial/Environmental Concerns:               Assessment & Plan:     Assessment and hospital summary:  30-year-old male brought to the emergency room by parents, decompensating with delusional statements and auditory hallucinations, noncompliant with meds. Does not believe he has a mental illness. Family expressing concerns about safety. Filed for MI commitment and Rios on 6/23. Pt now fully committed with Rios in place.     Today's Changes:  - Increase haloperidol from 5mg BID to 5mg qam + 10mg qpm, backed up with Haldol IM    Medications:  - Paliperidone PO 12 mg daily (most recent increase was on 7/18)  - Invega Sustenna 234mg given on 7/14 and 156mg administered 7/21  - Increase haloperidol from 5mg BID to 5mg qam + 10mg qpm, backed up with Haldol IM  - Benztropine 0.5mg BID to prevent against possible EPSE    Risks, benefits, and alternatives discussed at length with patient.     Acute Medical Problems and Treatments:  Acute medical concerns:  No acute medical concerns    Pertinent labs/imaging:  Dyslipidemia noted on lipid profile dated 6/22  CBC with diff and CMP checked on 7/11 and are wnl    Behavioral/Psychological/Social:  - Encourage unit programming    Safety:  - Continue precautions as noted above  - Status 15 minute checks    Legal Status: MI civilly committed with Rios    Disposition Plan   Reason for ongoing admission: is unable to care for self due to severe psychosis or neyda  Discharge location: IRTS  Discharge Medications: not ordered  Follow-up Appointments: not scheduled         Gabrielle Lechuga MD, PhD  07/25/2025

## 2025-07-26 PROCEDURE — 250N000013 HC RX MED GY IP 250 OP 250 PS 637: Performed by: PSYCHIATRY & NEUROLOGY

## 2025-07-26 PROCEDURE — 90853 GROUP PSYCHOTHERAPY: CPT

## 2025-07-26 PROCEDURE — 124N000002 HC R&B MH UMMC

## 2025-07-26 PROCEDURE — 250N000013 HC RX MED GY IP 250 OP 250 PS 637: Performed by: STUDENT IN AN ORGANIZED HEALTH CARE EDUCATION/TRAINING PROGRAM

## 2025-07-26 RX ADMIN — BENZTROPINE MESYLATE 0.5 MG: 0.5 TABLET ORAL at 08:17

## 2025-07-26 RX ADMIN — NICOTINE POLACRILEX 4 MG: 2 GUM, CHEWING ORAL at 08:25

## 2025-07-26 RX ADMIN — NICOTINE POLACRILEX 4 MG: 2 GUM, CHEWING ORAL at 12:11

## 2025-07-26 RX ADMIN — BENZTROPINE MESYLATE 0.5 MG: 0.5 TABLET ORAL at 20:22

## 2025-07-26 RX ADMIN — PALIPERIDONE 12 MG: 6 TABLET, EXTENDED RELEASE ORAL at 08:17

## 2025-07-26 RX ADMIN — HALOPERIDOL 5 MG: 5 TABLET ORAL at 08:17

## 2025-07-26 RX ADMIN — HALOPERIDOL 10 MG: 10 TABLET ORAL at 20:22

## 2025-07-26 ASSESSMENT — ACTIVITIES OF DAILY LIVING (ADL)
ADLS_ACUITY_SCORE: 23
HYGIENE/GROOMING: INDEPENDENT
ADLS_ACUITY_SCORE: 23
DRESS: SCRUBS (BEHAVIORAL HEALTH);INDEPENDENT
ADLS_ACUITY_SCORE: 23
ORAL_HYGIENE: INDEPENDENT
ORAL_HYGIENE: INDEPENDENT
DRESS: SCRUBS (BEHAVIORAL HEALTH);INDEPENDENT
ADLS_ACUITY_SCORE: 23
LAUNDRY: UNABLE TO COMPLETE
ADLS_ACUITY_SCORE: 23
HYGIENE/GROOMING: HANDWASHING;SHOWER;INDEPENDENT
ADLS_ACUITY_SCORE: 23

## 2025-07-26 NOTE — PLAN OF CARE
Joo appeared sleeping for 7 hours without distress   Continue on suicide  precaution with absence of self harm this shift  No pain or discomfort noted or verbalized  No outburst or other inappropriate behavior  this shift  No reported safety concern this shift       Problem: Adult Behavioral Health Plan of Care  Goal: Adheres to Safety Considerations for Self and Others  Intervention: Develop and Maintain Individualized Safety Plan  Recent Flowsheet Documentation  Taken 7/26/2025 0140 by Lois Hernandez RN  Safety Measures: safety rounds completed     Problem: Suicide Risk  Goal: Absence of Self-Harm  Outcome: Progressing   Goal Outcome Evaluation:

## 2025-07-26 NOTE — PLAN OF CARE
Problem: Psychotic Signs/Symptoms  Goal: Improved Behavioral Control (Psychotic Signs/Symptoms)  Outcome: Progressing  Intervention: Manage Behavior   Goal Outcome Evaluation:    Plan of Care Reviewed With: patient      Pt spent most of the evening shift out in the lounge. Pt watched TV and attended evening group. Pt kept to self. Pt did not interact with other peers. Pt denies pain and psych symptoms. Pt has a neutral mood and a flat affect. Pt requested and received Zyprexa for high anxiety with a full affect. Pt used a prn of nicotine gum one time. Pt has good hygiene and good appetite. Pt was medications compliant. No physical and behavioral issued reported throughout the shift.

## 2025-07-26 NOTE — PLAN OF CARE
"Goal Outcome Evaluation:    Pt is calm and pleasant, affect fairly full range. His mood is \"content.\" Pt rates anxiety 3-4/10, denies other MH sx. He said he would attend music therapy if held today, is social and in the milieu. Pt showered this morning, also. He states he does not have a particular goal for his day, and does not expect visitors.    Pt is reading in his rm, this afternoon. He has been in his rm more than out, when observed. Pt attended talk grp today-good shift.                          "

## 2025-07-26 NOTE — PLAN OF CARE
Group Attendance:  attended partial group    Time session began: 1115  Time session ended: 1200  Patient's total time in group: 20    Total # Attendees   4   Group Type psychotherapeutic, psychoeducational, and DBT     Group Topic Covered emotional regulation, insight improvement, symptom management, healthy coping skills, DBT skills: IMPROVE, mindfulness, and relaxation and grounding techniques/coping skills     Group Session Detail Group members checked in before the activity. Group members discussed the DBT acronym IMPROVE, discussing the meaning of each and putting them into practice.     Patient's response to the group topic/interactions:  cooperative with task, organized, socially appropriate, listened actively,      Patient Details: Pt attended group, checked in as feeling ok today. Pt followed along for part of group and shared some thoughts about finding meaning with some stress he had but told a story about getting a felony when he was very young b/c he had hundreds of guns and that was stressful. Pt seemed to listen and follow along. Pt took notes on the worksheet accompanying the group.            41591 - Group psychotherapy - 1 Session  Patient Active Problem List   Diagnosis    Encopresis    Urinary incontinence    Sprain of medial collateral ligament of knee    Auditory hallucination    Schizophrenia (H)    Suicide attempt (H)    Chronic paranoid schizophrenia (H)    Psychophysiological insomnia    Delusional thoughts (H)    Mood changes

## 2025-07-27 PROCEDURE — 124N000002 HC R&B MH UMMC

## 2025-07-27 PROCEDURE — 250N000013 HC RX MED GY IP 250 OP 250 PS 637: Performed by: STUDENT IN AN ORGANIZED HEALTH CARE EDUCATION/TRAINING PROGRAM

## 2025-07-27 PROCEDURE — 250N000013 HC RX MED GY IP 250 OP 250 PS 637: Performed by: PSYCHIATRY & NEUROLOGY

## 2025-07-27 PROCEDURE — 250N000013 HC RX MED GY IP 250 OP 250 PS 637: Performed by: NURSE PRACTITIONER

## 2025-07-27 RX ADMIN — PALIPERIDONE 12 MG: 6 TABLET, EXTENDED RELEASE ORAL at 08:18

## 2025-07-27 RX ADMIN — HALOPERIDOL 10 MG: 10 TABLET ORAL at 19:13

## 2025-07-27 RX ADMIN — BENZTROPINE MESYLATE 0.5 MG: 0.5 TABLET ORAL at 19:13

## 2025-07-27 RX ADMIN — NICOTINE POLACRILEX 4 MG: 2 GUM, CHEWING ORAL at 14:51

## 2025-07-27 RX ADMIN — BENZTROPINE MESYLATE 0.5 MG: 0.5 TABLET ORAL at 08:18

## 2025-07-27 RX ADMIN — NICOTINE POLACRILEX 4 MG: 2 GUM, CHEWING ORAL at 10:54

## 2025-07-27 RX ADMIN — HYDROXYZINE HYDROCHLORIDE 50 MG: 50 TABLET, FILM COATED ORAL at 08:19

## 2025-07-27 RX ADMIN — HALOPERIDOL 5 MG: 5 TABLET ORAL at 08:18

## 2025-07-27 RX ADMIN — NICOTINE POLACRILEX 4 MG: 2 GUM, CHEWING ORAL at 08:42

## 2025-07-27 ASSESSMENT — ACTIVITIES OF DAILY LIVING (ADL)
ADLS_ACUITY_SCORE: 23
ADLS_ACUITY_SCORE: 23
HYGIENE/GROOMING: INDEPENDENT
ADLS_ACUITY_SCORE: 23
LAUNDRY: UNABLE TO COMPLETE
ADLS_ACUITY_SCORE: 23
ORAL_HYGIENE: INDEPENDENT
ADLS_ACUITY_SCORE: 23
DRESS: SCRUBS (BEHAVIORAL HEALTH);INDEPENDENT
ADLS_ACUITY_SCORE: 23

## 2025-07-27 NOTE — PLAN OF CARE
Goal Outcome Evaluation:  Problem: Sleep Disturbance  Goal: Adequate Sleep/Rest  Outcome: Progressing     Pt in bed at beginning of shift, breathing quiet and unlabored.  Pt appears to sleep 5.5 hours.      No pt complaints or concerns at this time.      No PRNs given. Will continue to monitor.

## 2025-07-27 NOTE — PLAN OF CARE
Problem: Psychotic Signs/Symptoms  Goal: Improved Mood Symptoms (Psychotic Signs/Symptoms)  Outcome: Progressing  Intervention: Optimize Emotion and Mood   Goal Outcome Evaluation:    Plan of Care Reviewed With: patient      Pt was out in the lounge. Pt watched TV. Pt kept to self. Pt did not socialize with peers. Denies pain and psych symptoms. Neutral mood with a flat affect. Pt was compliant with medications and vital signs. Pt has good hygiene and good appetite. Pt was calm and cooperative with other nursing cares. Pt did not report and physical and behavioral concerns throughout the shift.

## 2025-07-27 NOTE — PROVIDER NOTIFICATION
07/27/25 0940   C-SSRS (Daily/Shift Screen)   Q2 Suicidal Thoughts (Since Last Contact) 0-->no   Q6 Suicide Behavior 0-->no   Assess Risk to Self and Maintain Safety   Behavior Management behavioral plan reviewed;boundaries reinforced;impulse control promoted   Enhanced Safety Measures review medications for side effects with activity;room near unit station   Promote Psychosocial Wellbeing   Family/Support System Care self-care encouraged   Sleep/Rest Enhancement natural light exposure provided;noise level reduced;awakenings minimized;reading promoted;comfort measures;regular sleep/rest pattern promoted;relaxation techniques promoted;consistent schedule promoted   Supportive Measures positive reinforcement provided;problem-solving facilitated;active listening utilized;relaxation techniques promoted;counseling provided;decision-making supported;goal-setting facilitated;self-care encouraged;self-reflection promoted;self-responsibility promoted   Establish Safety Plan and Continuity of Care   Safe Transition Promotion protective factors promoted   Environment of Care Checklist   Potentially harmful objects out of patient reach? yes   Personal belongings secured? yes   Patient dressed in hospital-provided attire only? yes   Plastic bags out of patient reach? yes   Patient care equipment (cords, cables, call bells, lines, and drains) shortened, removed, or accounted for? yes   Potentially toxic materials removed or secured? yes   Sharps container removed or secured? yes   Cabinets secured? yes   Room secured by RN per shift? yes

## 2025-07-27 NOTE — PLAN OF CARE
"Goal Outcome Evaluation:    Plan of Care Reviewed With: patient        Pt visible int the milieu, socialized with selected peers, watched TV both in room and milieu and showered first activity in the morning. Pt endorsed anxiety 5/10 and received prn hydroxyzine and reassessed for 2/10. Pt denied all other mental health psych symptoms and committed for safety. Pt described mood as ok and presented with flat affect or neutral affect, calm and polite. Pt complied with all medications and no side effect observed or reported. No outburst behavior observed or reported.  Adequate fluids and food intake, voiding freely and no BM concern per pt. Denied pain and shortness of breath and vital sign stable. /79 (BP Location: Left arm, Patient Position: Sitting, Cuff Size: Adult Large)   Pulse 99   Temp 98.7  F (37.1  C) (Temporal)   Resp 16   Ht 1.803 m (5' 11\")   Wt 134.5 kg (296 lb 9.6 oz)   SpO2 97%   BMI 41.37 kg/m                   "

## 2025-07-27 NOTE — PLAN OF CARE
Problem: Psychotic Signs/Symptoms  Goal: Improved Mood Symptoms (Psychotic Signs/Symptoms)  Outcome: Progressing  Intervention: Optimize Emotion and Mood   Goal Outcome Evaluation:    Plan of Care Reviewed With: patient      Pt split his evening shift between his room and lounge. Pt watched tv in the lounge. Pt denies pain and psych symptoms including SI, HI, SIB, AVH, paranoia and anxiety. Pt has a good appetite. He ate 100% of his diner meal. Pt kept to self. Did not socialize with peers. Pt was vital signs and medications compliant. Pt did not report any physical or behavioral concerns this evening shift.

## 2025-07-28 PROCEDURE — 250N000013 HC RX MED GY IP 250 OP 250 PS 637: Performed by: PSYCHIATRY & NEUROLOGY

## 2025-07-28 PROCEDURE — 124N000002 HC R&B MH UMMC

## 2025-07-28 PROCEDURE — 250N000013 HC RX MED GY IP 250 OP 250 PS 637: Performed by: STUDENT IN AN ORGANIZED HEALTH CARE EDUCATION/TRAINING PROGRAM

## 2025-07-28 PROCEDURE — 90853 GROUP PSYCHOTHERAPY: CPT

## 2025-07-28 PROCEDURE — 99232 SBSQ HOSP IP/OBS MODERATE 35: CPT | Performed by: PSYCHIATRY & NEUROLOGY

## 2025-07-28 RX ADMIN — BENZTROPINE MESYLATE 0.5 MG: 0.5 TABLET ORAL at 20:48

## 2025-07-28 RX ADMIN — HALOPERIDOL 10 MG: 10 TABLET ORAL at 20:48

## 2025-07-28 RX ADMIN — NICOTINE POLACRILEX 2 MG: 2 GUM, CHEWING ORAL at 14:46

## 2025-07-28 RX ADMIN — PALIPERIDONE 12 MG: 6 TABLET, EXTENDED RELEASE ORAL at 07:58

## 2025-07-28 RX ADMIN — HALOPERIDOL 5 MG: 5 TABLET ORAL at 07:58

## 2025-07-28 RX ADMIN — NICOTINE POLACRILEX 4 MG: 2 GUM, CHEWING ORAL at 11:17

## 2025-07-28 RX ADMIN — BENZTROPINE MESYLATE 0.5 MG: 0.5 TABLET ORAL at 07:58

## 2025-07-28 RX ADMIN — TRAZODONE HYDROCHLORIDE 100 MG: 100 TABLET ORAL at 20:49

## 2025-07-28 RX ADMIN — NICOTINE POLACRILEX 2 MG: 2 GUM, CHEWING ORAL at 07:58

## 2025-07-28 RX ADMIN — NICOTINE POLACRILEX 2 MG: 2 GUM, CHEWING ORAL at 10:34

## 2025-07-28 ASSESSMENT — ACTIVITIES OF DAILY LIVING (ADL)
ADLS_ACUITY_SCORE: 23
HYGIENE/GROOMING: INDEPENDENT
ORAL_HYGIENE: INDEPENDENT
ADLS_ACUITY_SCORE: 23
LAUNDRY: UNABLE TO COMPLETE
ADLS_ACUITY_SCORE: 23
DRESS: SCRUBS (BEHAVIORAL HEALTH);INDEPENDENT
ADLS_ACUITY_SCORE: 23

## 2025-07-28 NOTE — PLAN OF CARE
Joo noted to have slept for approximately  6.5 hours without distress   No endorsement of pain or suicidal thought tonight.  Pt stays calm without an outburst.       Problem: Adult Inpatient Plan of Care  Goal: Plan of Care Review  Description: The Plan of Care Review/Shift note should be completed every shift.  The Outcome Evaluation is a brief statement about your assessment that the patient is improving, declining, or no change.  This information will be displayed automatically on your shift  note.  Outcome: Progressing  Goal: Absence of Hospital-Acquired Illness or Injury  Intervention: Prevent Skin Injury  Recent Flowsheet Documentation  Taken 7/28/2025 0606 by Lois Hernandez RN  Body Position:   position changed independently   position maintained     Problem: Adult Behavioral Health Plan of Care  Goal: Plan of Care Review  Outcome: Progressing  Goal: Adheres to Safety Considerations for Self and Others  Intervention: Develop and Maintain Individualized Safety Plan  Recent Flowsheet Documentation  Taken 7/28/2025 0606 by Lois Hernandez RN  Safety Measures: safety rounds completed   Goal Outcome Evaluation:

## 2025-07-28 NOTE — PLAN OF CARE
Group Attendance:  came in and out of group session    Time session began: 1030  Time session ended: 1054  Patient's total time in group: 20    Total # Attendees   3   Group Type DBT     Group Topic Covered emotional regulation     Group Session Detail DBT Opposite Action to Emotion skill was taught.       Patient's response to the group topic/interactions:  engaged socially when prompted     Patient Details: Joo identified his current emotion using the emotion wheel. He benefited from alternative explanations of the Opposite Action to Emotion skill, and we explored actions he can use for emotions he reported were challenging for him.         20200 - Group psychotherapy - 1 Session  Patient Active Problem List   Diagnosis    Encopresis    Urinary incontinence    Sprain of medial collateral ligament of knee    Auditory hallucination    Schizophrenia (H)    Suicide attempt (H)    Chronic paranoid schizophrenia (H)    Psychophysiological insomnia    Delusional thoughts (H)    Mood changes

## 2025-07-28 NOTE — PLAN OF CARE
Team Note Due:  Monday     Assessment/Intervention/Current Symtoms and Care Coordination:  Chart review, + team.      Per team, provider wants to increase the Haldol some more prior to IRTS referrals.   Pt's delusions may not be fully treatable.      Pt greeted writer and waved. He had showered. He ate lunch in the milieu today with peers, whereas he often eats in isolation in his room.     Behavioral team note complete.     Discharge Plan or Goal:  IRTS when more stable     Barriers to Discharge:  Symptoms     Referral Status:  TBD     Legal Status:  MI commitment + Rios   County: [place]  File Number: [id number]   Start date: 7/11/25  Rios meds: Haldol, Prolixin, Clozaril, Risperdal, Zyprexa, Invega     PPS: Sindi Montes [phone]    Contacts (include MAURICE status):  Gilbert Serrato - Father (No MAURICE) Ph: [phone]      Upcoming Meetings and Dates/Important Information and next steps:  Coordinate care   Pt will need PD and COS at discharge.   Update internal referral tracker.  Pt will need psychiatry.     Pt will need IRTS referrals placed once stable.    * Pt has meds in discharge pharmacy that he will need to get before discharge *

## 2025-07-28 NOTE — PROGRESS NOTES
[facility], [place]   Psychiatric Progress Note  Hospital Day: 37        Interim History:   The patient's care was discussed with the treatment team during the daily team meeting and/or staff's chart notes were reviewed.     Sleep: 6.5 hours (07/28/25 0630)  Scheduled Medications: took all scheduled medications as prescribed   PRN medications:   Last 24H PRN:     nicotine (NICORETTE) gum 2-4 mg, 2 mg at 07/28/25 0758   Staff report: No acute safety concerns. See staff notes for additional details. Not calling 911 over the weekend.     Today, Joo reports that he feels stable and at baseline. Denies feeling bothered or distressed about being monitored by the FBI. No concerns about leeches today. He said that some of his experiences could possibly be related to an underlying mental health condition so some improvement in insight noted. He would like to be discharged ASAP.     Suicidal ideation: denies current or recent suicidal ideation or behaviors.    Homicidal ideation: denies current or recent homicidal ideation or behaviors.    Psychotic symptoms:  As above    Medication side effects reported: No significant side effects. Denies constipation.     Acute medical concerns: yes, as above    Other issues reported by patient: Patient had no further questions or concerns.           Medications:     Current Facility-Administered Medications   Medication Dose Route Frequency Provider Last Rate Last Admin    benztropine (COGENTIN) tablet 0.5 mg  0.5 mg Oral BID Jo-Ann Lainez MD   0.5 mg at 07/28/25 0758    haloperidol (HALDOL) tablet 10 mg  10 mg Oral At Bedtime Gabrielle Lechuga MD   10 mg at 07/27/25 1913    Or    haloperidol lactate (HALDOL) injection 10 mg  10 mg Intramuscular At Bedtime Gabrielle Lechuga MD        haloperidol (HALDOL) tablet 5 mg  5 mg Oral Daily Gabrielle Lechuga MD   5 mg at 07/28/25 0758    Or    haloperidol lactate (HALDOL) injection 5 mg  5 mg Intramuscular  "Daily Gabrielle Lechuga MD        paliperidone ER (INVEGA) 24 hr tablet 12 mg  12 mg Oral Daily Jo-Ann Lainez MD   12 mg at 07/28/25 0758    Or    OLANZapine (zyPREXA) injection 10 mg  10 mg Intramuscular Daily Jo-Ann Lainez MD              Allergies:     Allergies   Allergen Reactions    No Known Drug Allergy           Labs:   No results found for this or any previous visit (from the past 24 hours).       Psychiatric Examination:     /66   Pulse 68   Temp 98  F (36.7  C) (Oral)   Resp 17   SpO2 97%   Weight is 0 lbs 0 oz  There is no height or weight on file to calculate BMI.    Weight over time:  There were no vitals filed for this visit.    Orthostatic Vitals         Most Recent      Sitting Orthostatic /75 07/21 0844    Sitting Orthostatic Pulse (bpm) 97 07/21 0844       /79   Pulse 96   Temp 97.5  F (36.4  C) (Temporal)   Resp 16   Ht 1.803 m (5' 11\")   Wt 134.5 kg (296 lb 9.6 oz)   SpO2 96%   BMI 41.37 kg/m    Weight is 296 lbs 9.6 oz  Body mass index is 41.37 kg/m .    Weight over time:  Vitals:    07/22/25 1635   Weight: 134.5 kg (296 lb 9.6 oz)       Orthostatic Vitals         Most Recent      Sitting Orthostatic /75 07/21 0844    Sitting Orthostatic Pulse (bpm) 97 07/21 0844              Cardiometabolic risk assessment. 07/28/2025    Reviewed patient profile for cardiometabolic risk factors    Date taken / Value  REFERENCE RANGE   Abdominal Obesity  (Waist Circumference)   See nursing flowsheet Women >=35 in (88 cm)   Men >=40 in (102 cm)      Triglycerides  Triglycerides   Date Value Ref Range Status   06/22/2025 222 (H) <150 mg/dL Final      HDL cholesterol  Direct Measure HDL   Date Value Ref Range Status   06/22/2025 27 (L) >=40 mg/dL Final   ]   Fasting plasma glucose (FPG) Glucose   Date Value Ref Range Status   07/11/2025 96 70 - 99 mg/dL Final   10/02/2020 80 70 - 99 mg/dL Final      Blood pressure  Blood Pressure  07/28/25 : 112/79  06/21/25 : " "121/77  03/06/25 : 142/86   Blood pressure >=130/85 mmHg or treatment for elevated blood pressure   Family History  See family history     Mental Status Exam:  Appearance: awake, alert and well groomed  Attitude:  cooperative, less irritable  Eye Contact: less intense  Mood: good  Affect:  mood congruent, heightened intensity  Speech:  clear, coherent  Language: fluent and intact in English  Psychomotor, Gait, Musculoskeletal:  no evidence of tardive dyskinesia, dystonia, or tics  Throught Process:  linear, goal oriented  Associations:  no loose associations  Thought Content:  no evidence of suicidal ideation or homicidal ideation and no overt symptoms of psychosis today  Insight:  limited though subtle improvements noted today  Judgement:  limited  Oriented to:  time, person, and place  Attention Span and Concentration:  fair  Recent and Remote Memory:  fair  Fund of Knowledge:  low-normal           Precautions:     Behavioral Orders   Procedures    Code 1 - Restrict to Unit    Routine Programming     As clinically indicated    Status 15     Every 15 minutes.    Suicide precautions: Suicide Risk: HIGH; Clinical rationale to override score: modification to the care environment     Patients on Suicide Precautions should have a Combination Diet ordered that includes a Diet selection(s) AND a Behavioral Tray selection for Safe Tray - with utensils, or Safe Tray - NO utensils       Suicide Risk:   HIGH     Clinical rationale to override score::   modification to the care environment          Diagnoses:     Schizophrenia, decompensated    Clinically Significant Risk Factors    # Morbid Obesity: Estimated body mass index is 41.37 kg/m  as calculated from the following:    Height as of this encounter: 1.803 m (5' 11\").    Weight as of this encounter: 134.5 kg (296 lb 9.6 oz).      # Financial/Environmental Concerns:               Assessment & Plan:     Assessment and hospital summary:  30-year-old male brought to the " emergency room by parents, decompensating with delusional statements and auditory hallucinations, noncompliant with meds. Does not believe he has a mental illness. Family expressing concerns about safety. Filed for MI commitment and Rios on 6/23. Pt now fully committed with Rios in place.     Today's Changes:  Continue current medications without changes. Some improvements noted since starting Haldol.     Medications:  - Haldol 5 mg daily and 10 mg at bedtime backed up with Haldol IM  - Paliperidone PO 12 mg daily (most recent increase was on 7/18)  - Invega Sustenna 234mg given on 7/14 and 156mg administered 7/21  - Benztropine 0.5mg BID to prevent against possible EPSE    Risks, benefits, and alternatives discussed at length with patient.     Acute Medical Problems and Treatments:  Acute medical concerns:  No acute medical concerns    Pertinent labs/imaging:  Dyslipidemia noted on lipid profile dated 6/22  CBC with diff and CMP checked on 7/11 and are wnl    Behavioral/Psychological/Social:  - Encourage unit programming    Safety:  - Continue precautions as noted above  - Status 15 minute checks    Legal Status: MI civilly committed with Rios    Disposition Plan   Reason for ongoing admission: is unable to care for self due to severe psychosis or neyda  Discharge location: IRTS  Discharge Medications: not ordered  Follow-up Appointments: not scheduled    Nancy Lainez MD  [facility] Psychiatry     07/28/2025

## 2025-07-28 NOTE — PROVIDER NOTIFICATION
07/28/25 1209   Individualization/Patient Specific Goals   Patient Personal Strengths family/social support   Patient Vulnerabilities history of unsuccessful treatment;lacks insight into illness;housing insecurity;family/relationship conflict;adverse childhood experience(s)   Interprofessional Rounds   Participants nursing;CTC;psychiatrist   Behavioral Team Discussion   Participants Dr Migel MD, Ara COVARRUBIAS, Dilcia BARRERA RN Manager, [facility], Annie Allen RN, Haily MA RN   Progress Per team, provider wants to increase the Haldol some more prior to IRTS referrals being placed. Pt's delusions may not be fully treatable. Pt greeted writer and waved. He had showered. He ate lunch in the milieu today with peers, whereas he often eats in isolation in his room. He is sleeping well. No agitation or behavioral outbursts. Needs more stabilization.   Medical/Physical See H&P   Precautions Suicide   Plan Needs more stabilization prior to IRTS referrals being placed. Plan is to increase Haldol.   Safety Plan To be completed with unit therapist prior to discharge.   Anticipated Discharge Disposition IRTS     Goal Outcome Evaluation:  PRECAUTIONS AND SAFETY    Behavioral Orders   Procedures    Code 1 - Restrict to Unit    Routine Programming     As clinically indicated    Status 15     Every 15 minutes.    Suicide precautions: Suicide Risk: HIGH; Clinical rationale to override score: modification to the care environment     Patients on Suicide Precautions should have a Combination Diet ordered that includes a Diet selection(s) AND a Behavioral Tray selection for Safe Tray - with utensils, or Safe Tray - NO utensils       Suicide Risk:   HIGH     Clinical rationale to override score::   modification to the care environment       Safety  Safety WDL: WDL  Patient Location: patient room, own  Observed Behavior: calm, sitting  Observed Behavior (Comment): sitting in the lounge  Safety Measures: safety rounds completed,  self-directed behavior promoted, suicide assessment completed, suicide check-in completed  Diversional Activity: television  De-Escalation Techniques: quiet time facilitated  Suicidality: Status 15, Minimal furniture in room

## 2025-07-28 NOTE — PLAN OF CARE
BEH IP Unit Acuity Rating Score (UARS)  Patient is given one point for every criteria they meet.    CRITERIA SCORING   On a 72 hour hold, court hold, committed, stay of commitment, or revocation. 1    Patient LOS on BEH unit exceeds 20 days. 1 LOS: 37   Patient under guardianship, 55+, otherwise medically complex, or under age 11. 0   Suicide ideation without relief of precipitating factors. 0   Current plan for suicide. 0   Current plan for homicide. 0   Imminent risk or actual attempt to seriously harm another without relief of factors precipitating the attempt. 0   Severe dysfunction in daily living (ex: complete neglect for self care, extreme disruption in vegetative function, extreme deterioration in social interactions). 0   Recent (last 7 days) or current physical aggression in the ED or on unit. 0   Restraints or seclusion episode in past 72 hours. 0   Recent (last 7 days) or current verbal aggression, agitation, yelling, etc., while in the ED or unit. 0   Active psychosis. 1   Need for constant or near constant redirection (from leaving, from others, etc).  0   Intrusive or disruptive behaviors. 0   Patient requires 3 or more hours of individualized nursing care per 8-hour shift (i.e. for ADLs, meds, therapeutic interventions). 0   TOTAL 3

## 2025-07-28 NOTE — PLAN OF CARE
Group Attendance:  attended full group    Time session began: 1645  Time session ended: 1715  Patient's total time in group: 30    Total # Attendees   4   Group Type psychotherapeutic     Group Topic Covered emotional regulation, insight improvement, symptom management, relationships, and healthy coping skills     Group Session Detail Participants engaged in a structured activity that fostered community using psychotherapy prompts that challenged individuals to be open and vulnerable about their lived experiences with mental health care and symptom management in a group setting. Prompts varied and were based in strength exploration, coping strategies, psychoeducation, symptom management, and relationships.       Patient's response to the group topic/interactions:  actively engaged     Patient Details: Joo participated in the activity and shared, during multiple prompts, that working hard is important to him and his goals revolve around his job. No delusional thought content noted.          48146 - Group psychotherapy - 1 Session  Patient Active Problem List   Diagnosis    Encopresis    Urinary incontinence    Sprain of medial collateral ligament of knee    Auditory hallucination    Schizophrenia (H)    Suicide attempt (H)    Chronic paranoid schizophrenia (H)    Psychophysiological insomnia    Delusional thoughts (H)    Mood changes

## 2025-07-28 NOTE — PLAN OF CARE
"Problem: Psychotic Signs/Symptoms  Goal: Improved Behavioral Control (Psychotic Signs/Symptoms)  Outcome: Progressing  Patient was visible in the lounge area for most of this shift. He is alert and oriented x3, calm and compliant with vitals check and medication administration. He did not complain of pain and prn nicotine gum was given. While in the lounge, patient was not heard talking about the FBI or the police. He took a shower at the start of shift and he looks well groomed. He ate 100% of his breakfast and lunch without issues in the lounge area. He denies feeling anxious, agitated, SI/SIB/HI. He is overall calm with no aggression or agitation. Patient can have snacks from his locker, including caffeine beverages.     /79   Pulse 96   Temp 97.5  F (36.4  C) (Temporal)   Resp 16   Ht 1.803 m (5' 11\")   Wt 134.5 kg (296 lb 9.6 oz)   SpO2 96%   BMI 41.37 kg/m       "

## 2025-07-29 PROCEDURE — 250N000013 HC RX MED GY IP 250 OP 250 PS 637: Performed by: PSYCHIATRY & NEUROLOGY

## 2025-07-29 PROCEDURE — 124N000002 HC R&B MH UMMC

## 2025-07-29 PROCEDURE — 250N000013 HC RX MED GY IP 250 OP 250 PS 637: Performed by: STUDENT IN AN ORGANIZED HEALTH CARE EDUCATION/TRAINING PROGRAM

## 2025-07-29 PROCEDURE — 90853 GROUP PSYCHOTHERAPY: CPT

## 2025-07-29 RX ADMIN — NICOTINE POLACRILEX 2 MG: 2 GUM, CHEWING ORAL at 10:53

## 2025-07-29 RX ADMIN — OLANZAPINE 10 MG: 10 TABLET, FILM COATED ORAL at 12:27

## 2025-07-29 RX ADMIN — NICOTINE POLACRILEX 4 MG: 2 GUM, CHEWING ORAL at 12:29

## 2025-07-29 RX ADMIN — HALOPERIDOL 5 MG: 5 TABLET ORAL at 08:17

## 2025-07-29 RX ADMIN — NICOTINE POLACRILEX 4 MG: 2 GUM, CHEWING ORAL at 08:19

## 2025-07-29 RX ADMIN — BENZTROPINE MESYLATE 0.5 MG: 0.5 TABLET ORAL at 21:20

## 2025-07-29 RX ADMIN — HALOPERIDOL 10 MG: 10 TABLET ORAL at 21:20

## 2025-07-29 RX ADMIN — BENZTROPINE MESYLATE 0.5 MG: 0.5 TABLET ORAL at 08:17

## 2025-07-29 ASSESSMENT — ACTIVITIES OF DAILY LIVING (ADL)
ADLS_ACUITY_SCORE: 23

## 2025-07-29 NOTE — PLAN OF CARE
"Team Note Due:  Monday     Assessment/Intervention/Current Symtoms and Care Coordination:  Chart review, no team today.      Provider wants to increase the Haldol some more prior to IRTS referrals.   Pt's delusions may not be fully treatable.      I met with pt to check in, he was well groomed. He said he felt good, \"I've always felt good since I got here\". Pt was asking if we could start IRTS referrals and I stated we will wait until the doctor is back tomorrow and check in after that. He stated ok.     Discharge Plan or Goal:  IRTS when more stable     Barriers to Discharge:  Symptoms     Referral Status:  TBD     Legal Status:  MI commitment + Rios   [facility]: [facility]  File Number: [id number]   Start date: 7/11/25  Rios meds: Haldol, Prolixin, Clozaril, Risperdal, Zyprexa, Invega     PPS: Sindi Montes [phone]    Contacts (include MAURICE status):  Gilbert Serrato - Father (No MAURICE) Ph: [phone]      Upcoming Meetings and Dates/Important Information and next steps:  Coordinate care   Pt will need PD and COS at discharge.   Update internal referral tracker.  Pt will need psychiatry.     Pt will need IRTS referrals placed once stable.    * Pt has meds in discharge pharmacy that he will need to get before discharge *      "

## 2025-07-29 NOTE — PLAN OF CARE
BEH IP Unit Acuity Rating Score (UARS)  Patient is given one point for every criteria they meet.    CRITERIA SCORING   On a 72 hour hold, court hold, committed, stay of commitment, or revocation. 1    Patient LOS on BEH unit exceeds 20 days. 1 LOS: 38   Patient under guardianship, 55+, otherwise medically complex, or under age 11. 0   Suicide ideation without relief of precipitating factors. 0   Current plan for suicide. 0   Current plan for homicide. 0   Imminent risk or actual attempt to seriously harm another without relief of factors precipitating the attempt. 0   Severe dysfunction in daily living (ex: complete neglect for self care, extreme disruption in vegetative function, extreme deterioration in social interactions). 0   Recent (last 7 days) or current physical aggression in the ED or on unit. 0   Restraints or seclusion episode in past 72 hours. 0   Recent (last 7 days) or current verbal aggression, agitation, yelling, etc., while in the ED or unit. 0   Active psychosis. 1   Need for constant or near constant redirection (from leaving, from others, etc).  0   Intrusive or disruptive behaviors. 0   Patient requires 3 or more hours of individualized nursing care per 8-hour shift (i.e. for ADLs, meds, therapeutic interventions). 0   TOTAL 3

## 2025-07-29 NOTE — PLAN OF CARE
Problem: Psychotic Signs/Symptoms  Goal: Improved Psychomotor Symptoms (Psychotic Signs/Symptoms)  Outcome: Progressing   Goal Outcome Evaluation:         Has had a good evening tonight. Played card games with peers. Did not speak of any delusional thought content tonight. Quite when pleasant in the lounge. He denies depression/SI/SIB. Denies anxiety AH/VH. Took all scheduled medications without incident. He went to bed early and offered no other concerns.

## 2025-07-29 NOTE — PLAN OF CARE
"Goal Outcome Evaluation:    At the start of the shift pt was up pacing in the lounge. He is med complaint and denies all med SE's. Pt denies all psychiatric symptoms including SI, HI and SIB. He received PRN Zyprexa with relief after a conversation with CTC. He presents as guarded and withdrawn with a flat affect.No overtly delusional or paranoid statement noted. Pt has been perseverating on discharge . He spent time watching TV in the lounge and room but declined engaging with most staff and peers. Pt reports haldol is helping with thoughtts. He presents well groomed and is completing hygiene. Nutrition is adequate.There are no acute physical concerns, and the patient denies experiencing any pain       /81 (BP Location: Right arm, Patient Position: Sitting, Cuff Size: Adult Large)   Pulse 81   Temp 97.6  F (36.4  C)   Resp 16   Ht 1.803 m (5' 11\")   Wt 133.4 kg (294 lb 1.6 oz)   SpO2 97%   BMI 41.02 kg/m         Last 24H PRN:     nicotine (NICORETTE) gum 2-4 mg, 4 mg at 07/29/25 1229    OLANZapine (zyPREXA) tablet 10 mg, 10 mg at 07/29/25 1227 **OR** OLANZapine (zyPREXA) injection 10 mg    traZODone (DESYREL) tablet 100 mg, 100 mg at 07/28/25 2049   "

## 2025-07-29 NOTE — PLAN OF CARE
Group Attendance:  came in and out of group session    Time session began: 10:15 AM  Time session ended: 10:55 PM  Patient's total time in group: 40 Mins    Total # Attendees   4   Group Type psychotherapeutic and DBT     Group Topic Covered insight improvement, relationships, DBT skills: Interpersonal effectiveness, building constructive relationships and end destructive relationships walking middle path, and relationships and boundaries     Group Session Detail Group began with a short check ins before starting card game [employer]. While playing card game writer introduced the topic which was including walking the middle path and building constructive relationships and ending destructive ones. While discussing building healthy relationships and ending destructive relationships writer processed with group importance of radical acceptance.     Patient's response to the group topic/interactions:  positive affect, supportive of peers, socially appropriate, listened actively, actively engaged, engaged socially when prompted, and used humor appropriately     Patient Details: Pt came to group in a good mood and was socially engaged with peers. Pt was active in group discussion and was supportive to peers. Pt described places where he could make more healthy relationships sighting work and the gym. Pt notes that it is easier to work on mental health when in a supportive environment. Writer notes group members formed a bond during stay which is therapeutic.            [id number] - Group psychotherapy - 1 Session  Patient Active Problem List   Diagnosis    Encopresis    Urinary incontinence    Sprain of medial collateral ligament of knee    Auditory hallucination    Schizophrenia (H)    Suicide attempt (H)    Chronic paranoid schizophrenia (H)    Psychophysiological insomnia    Delusional thoughts (H)    Mood changes

## 2025-07-29 NOTE — PLAN OF CARE
Problem: Sleep Disturbance  Goal: Adequate Sleep/Rest  Outcome: Progressing   Goal Outcome Evaluation:[id number]: Pt sleeping at the start of the shift in no apparent distress. Continue on suicide precautions without any related behavior noted.Safety maintained.Slept all night for 6.5 hours

## 2025-07-30 PROCEDURE — 124N000002 HC R&B MH UMMC

## 2025-07-30 PROCEDURE — 250N000013 HC RX MED GY IP 250 OP 250 PS 637: Performed by: PSYCHIATRY & NEUROLOGY

## 2025-07-30 PROCEDURE — 99232 SBSQ HOSP IP/OBS MODERATE 35: CPT | Performed by: PSYCHIATRY & NEUROLOGY

## 2025-07-30 PROCEDURE — 250N000013 HC RX MED GY IP 250 OP 250 PS 637: Performed by: STUDENT IN AN ORGANIZED HEALTH CARE EDUCATION/TRAINING PROGRAM

## 2025-07-30 RX ADMIN — HALOPERIDOL 5 MG: 5 TABLET ORAL at 08:18

## 2025-07-30 RX ADMIN — BENZTROPINE MESYLATE 0.5 MG: 0.5 TABLET ORAL at 20:25

## 2025-07-30 RX ADMIN — HALOPERIDOL 10 MG: 10 TABLET ORAL at 21:05

## 2025-07-30 RX ADMIN — OLANZAPINE 10 MG: 10 TABLET, FILM COATED ORAL at 13:46

## 2025-07-30 RX ADMIN — NICOTINE POLACRILEX 4 MG: 2 GUM, CHEWING ORAL at 07:17

## 2025-07-30 RX ADMIN — BENZTROPINE MESYLATE 0.5 MG: 0.5 TABLET ORAL at 08:18

## 2025-07-30 RX ADMIN — NICOTINE POLACRILEX 4 MG: 2 GUM, CHEWING ORAL at 13:46

## 2025-07-30 ASSESSMENT — ACTIVITIES OF DAILY LIVING (ADL)
ADLS_ACUITY_SCORE: 23
HYGIENE/GROOMING: INDEPENDENT
ADLS_ACUITY_SCORE: 23

## 2025-07-30 NOTE — PLAN OF CARE
"  Problem: Psychotic Signs/Symptoms  Goal: Improved Behavioral Control (Psychotic Signs/Symptoms)  Outcome: Progressing  Intervention: Manage Behavior  Recent Flowsheet Documentation  Taken 7/29/2025 2100 by Jass Cortez RN  De-Escalation Techniques:   quiet time facilitated   physical activity promoted   medication offered   medication administered   Goal Outcome Evaluation:    Plan of Care Reviewed With: patient      Patient has been isolative to himself and avoids social contact. He denies having any thoughts of wanting to harm himself or others, depression, anxiety, visual hallucinations/ auditory hallucinations. When writer went to his room to administer his night time medication he was observed sleeping , and it took awhile to wake him up. He is medication compliant and reports eating well and drinking well.     Blood pressure 111/79, pulse 88, temperature 97.5  F (36.4  C), temperature source Temporal, resp. rate 18, height 1.803 m (5' 11\"), weight 133.4 kg (294 lb 1.6 oz), SpO2 97%.               "

## 2025-07-30 NOTE — PLAN OF CARE
Goal Outcome Evaluation:    Plan of Care Reviewed With: patient        Pt isolative and withdrawn in his room and came out for meals. Pt denied all psych mental health symptoms and committed for safety. Pt described mood as ok and presented with flat affect, calm and polite. Pt complied with medications and no side effect observed or reported.   Adequate fluids and food intake, voiding freely and no BM concern. Pt denied pain and shortness of breath and pt refused vital sign.

## 2025-07-30 NOTE — PROGRESS NOTES
"[facility], [place]   Psychiatric Progress Note  Hospital Day: 39        Interim History:   The patient's care was discussed with the treatment team during the daily team meeting and/or staff's chart notes were reviewed.     Sleep: 6.5 hours (07/29/25 0700)  Scheduled Medications: took all scheduled medications as prescribed   PRN medications:   Last 24H PRN:     nicotine (NICORETTE) gum 2-4 mg, 4 mg at 07/30/25 0717    OLANZapine (zyPREXA) tablet 10 mg, 10 mg at 07/29/25 1227 **OR** OLANZapine (zyPREXA) injection 10 mg   Staff report: No acute safety concerns. See staff notes for additional details. He denies having any thoughts of wanting to harm himself or others, depression, anxiety, visual hallucinations/ auditory hallucinations. Significant reduction in overt sx of psychosis. Has not recently been calling 911.     Today, Joo reports that he is feeling better, just \"bored.\" He said that Haldol helps him feel calmer. Also noted that his memory recall has improved since starting it. He does not want to get Haldol Dec because of his fear of needles and promises that he will take it orally upon discharge.     Suicidal ideation: denies current or recent suicidal ideation or behaviors.    Homicidal ideation: denies current or recent homicidal ideation or behaviors.    Psychotic symptoms:  As above    Medication side effects reported: No significant side effects. Denies constipation.     Acute medical concerns: yes, as above    Other issues reported by patient: Patient had no further questions or concerns.           Medications:     Current Facility-Administered Medications   Medication Dose Route Frequency Provider Last Rate Last Admin    benztropine (COGENTIN) tablet 0.5 mg  0.5 mg Oral BID Jo-Ann Lainez MD   0.5 mg at 07/30/25 0818    haloperidol (HALDOL) tablet 10 mg  10 mg Oral At Bedtime Gabrielle Lechuga MD   10 mg at 07/29/25 2120    Or    haloperidol lactate (HALDOL) " "injection 10 mg  10 mg Intramuscular At Bedtime Gabrielle Lechuga MD        haloperidol (HALDOL) tablet 5 mg  5 mg Oral Daily Gabrielle Lechuga MD   5 mg at 07/30/25 0818    Or    haloperidol lactate (HALDOL) injection 5 mg  5 mg Intramuscular Daily Gabrielle Lechuga MD              Allergies:     Allergies   Allergen Reactions    No Known Drug Allergy           Labs:   No results found for this or any previous visit (from the past 24 hours).       Psychiatric Examination:     /66   Pulse 68   Temp 98  F (36.7  C) (Oral)   Resp 17   SpO2 97%   Weight is 0 lbs 0 oz  There is no height or weight on file to calculate BMI.    Weight over time:  There were no vitals filed for this visit.    Orthostatic Vitals         Most Recent      Sitting Orthostatic /75 07/21 0844    Sitting Orthostatic Pulse (bpm) 97 07/21 0844       /73   Pulse 95   Temp 97.5  F (36.4  C) (Temporal)   Resp 16   Ht 1.803 m (5' 11\")   Wt 133.4 kg (294 lb 1.6 oz)   SpO2 97%   BMI 41.02 kg/m    Weight is 294 lbs 1.6 oz  Body mass index is 41.02 kg/m .    Weight over time:  Vitals:    07/22/25 1635 07/29/25 0832   Weight: 134.5 kg (296 lb 9.6 oz) 133.4 kg (294 lb 1.6 oz)       Orthostatic Vitals         Most Recent      Sitting Orthostatic /75 07/21 0844    Sitting Orthostatic Pulse (bpm) 97 07/21 0844              Cardiometabolic risk assessment. 07/30/2025    Reviewed patient profile for cardiometabolic risk factors    Date taken / Value  REFERENCE RANGE   Abdominal Obesity  (Waist Circumference)   See nursing flowsheet Women >=35 in (88 cm)   Men >=40 in (102 cm)      Triglycerides  Triglycerides   Date Value Ref Range Status   06/22/2025 222 (H) <150 mg/dL Final      HDL cholesterol  Direct Measure HDL   Date Value Ref Range Status   06/22/2025 27 (L) >=40 mg/dL Final   ]   Fasting plasma glucose (FPG) Glucose   Date Value Ref Range Status   07/11/2025 96 70 - 99 mg/dL Final   10/02/2020 80 70 - 99 mg/dL Final      Blood " "pressure  Blood Pressure  07/30/25 : 106/73  06/21/25 : 121/77  03/06/25 : 142/86   Blood pressure >=130/85 mmHg or treatment for elevated blood pressure   Family History  See family history     Mental Status Exam:  Appearance: awake, alert and well groomed  Attitude:  cooperative, less irritable  Eye Contact: less intense  Mood: good  Affect:  mood congruent, normal range  Speech:  clear, coherent  Language: fluent and intact in English  Psychomotor, Gait, Musculoskeletal:  no evidence of tardive dyskinesia, dystonia, or tics  Throught Process:  linear, goal oriented  Associations:  no loose associations  Thought Content:  no evidence of suicidal ideation or homicidal ideation and no overt symptoms of psychosis today  Insight:  limited though subtle improvements noted today  Judgement: fair  Oriented to:  time, person, and place  Attention Span and Concentration:  fair  Recent and Remote Memory:  fair  Fund of Knowledge:  low-normal           Precautions:     Behavioral Orders   Procedures    Code 1 - Restrict to Unit    Routine Programming     As clinically indicated    Status 15     Every 15 minutes.    Suicide precautions: Suicide Risk: HIGH; Clinical rationale to override score: modification to the care environment     Patients on Suicide Precautions should have a Combination Diet ordered that includes a Diet selection(s) AND a Behavioral Tray selection for Safe Tray - with utensils, or Safe Tray - NO utensils       Suicide Risk:   HIGH     Clinical rationale to override score::   modification to the care environment          Diagnoses:     Schizophrenia, decompensated    Clinically Significant Risk Factors    # Morbid Obesity: Estimated body mass index is 41.02 kg/m  as calculated from the following:    Height as of this encounter: 1.803 m (5' 11\").    Weight as of this encounter: 133.4 kg (294 lb 1.6 oz).      # Financial/Environmental Concerns:               Assessment & Plan:     Assessment and hospital " summary:  30-year-old male brought to the emergency room by parents, decompensating with delusional statements and auditory hallucinations, noncompliant with meds. Does not believe he has a mental illness. Family expressing concerns about safety. Filed for MI commitment and Rios on 6/23. Pt now fully committed with Rios in place.     Today's Changes:  Continue current medications without changes. Some improvements noted since starting Haldol.   Joo understands that recommendation is to start Haldol Dec prior to discharge, but is adamantly opposed due to his fear of needles. He stated that he will take oral medications as prescribed per Rios order.     Remove SI precautions as patient has not reported SI since admission    Medications:  - Haldol 5 mg daily and 10 mg at bedtime backed up with Haldol IM  - Paliperidone PO 12 mg daily (most recent increase was on 7/18)  - Invega Sustenna 234mg given on 7/14 and 156mg administered 7/21  - Benztropine 0.5mg BID to prevent against possible EPSE    Risks, benefits, and alternatives discussed at length with patient.     Acute Medical Problems and Treatments:  Acute medical concerns:  No acute medical concerns    Pertinent labs/imaging:  Dyslipidemia noted on lipid profile dated 6/22  CBC with diff and CMP checked on 7/11 and are wnl    Behavioral/Psychological/Social:  - Encourage unit programming    Safety:  - Continue precautions as noted above  - Status 15 minute checks    Legal Status: MI civilly committed with Rios    Disposition Plan   Reason for ongoing admission: is unable to care for self due to severe psychosis or neyda  Discharge location: IRTS  Discharge Medications: not ordered  Follow-up Appointments: not scheduled    Nancy Lainez MD  [facility]     07/30/2025

## 2025-07-30 NOTE — PLAN OF CARE
"  Problem: Adult Behavioral Health Plan of Care  Goal: Adheres to Safety Considerations for Self and Others  Outcome: Progressing     Problem: Suicide Risk  Goal: Absence of Self-Harm  Outcome: Progressing   Goal Outcome Evaluation:       Patient pleasant, calm, and cooperative. Isolative to his room. Patient denies any issues with mental health including anxiety, depression, SI, HI Hallucinations. Patient states he is \"fine\" and just waiting for placement. Patient requested snacks from locker outside of times allowed per order. Patient was reminded of the order and the personal snacks were not given.                     "

## 2025-07-30 NOTE — PLAN OF CARE
BEH IP Unit Acuity Rating Score (UARS)  Patient is given one point for every criteria they meet.    CRITERIA SCORING   On a 72 hour hold, court hold, committed, stay of commitment, or revocation. 1    Patient LOS on BEH unit exceeds 20 days. 1 LOS: 39   Patient under guardianship, 55+, otherwise medically complex, or under age 11. 0   Suicide ideation without relief of precipitating factors. 0   Current plan for suicide. 0   Current plan for homicide. 0   Imminent risk or actual attempt to seriously harm another without relief of factors precipitating the attempt. 0   Severe dysfunction in daily living (ex: complete neglect for self care, extreme disruption in vegetative function, extreme deterioration in social interactions). 0   Recent (last 7 days) or current physical aggression in the ED or on unit. 0   Restraints or seclusion episode in past 72 hours. 0   Recent (last 7 days) or current verbal aggression, agitation, yelling, etc., while in the ED or unit. 0   Active psychosis. 1   Need for constant or near constant redirection (from leaving, from others, etc).  0   Intrusive or disruptive behaviors. 0   Patient requires 3 or more hours of individualized nursing care per 8-hour shift (i.e. for ADLs, meds, therapeutic interventions). 0   TOTAL 3

## 2025-07-30 NOTE — PLAN OF CARE
"Team Note Due:  Monday     Assessment/Intervention/Current Symtoms and Care Coordination:  Chart review, and team.    Per team, no behavioral issues or concerns. No mention of leeches.     I met with patient today to share that provider has okay'd the IRTS referrals. Pt appeared happy and stated \"send to all the IRTS you can!\". I shared I would get the referrals started for him. Pt was calm and cooperative.     I placed IRTS referrals with:  [facility] I[facility]S - contact is: Ravi Sanders   [facility]  [facility] I[facility]S -  I called [phone] to ask about their IRTS referral process and they wanted it submitted online through a secure portal. IRTS referral was submitted online through the secure portal. They stated the waitlist is about 4 weeks.     Discharge Plan or Goal:  IRTS     Barriers to Discharge:  Symptoms     Referral Status:  7/30 [facility]  7/30 [facility]  7/30 [facility] IRTS - contact is: Ravi Acewicho   7/30 [facility]  7/30 [facility] I[facility] -  I called [phone] to ask about their IRTS referral process and they wanted it submitted online through a secure portal. IRTS referral was submitted online through the secure portal. They stated the waitlist is about 4 weeks.      Legal Status:  MI commitment + [facility]: [place]  File Number: [id number]   Start date: 7/11/25  Rios meds: Haldol, Prolixin, Clozaril, Risperdal, Zyprexa, Invega     PPS: Sindi Montes [phone]    Contacts (include MAURICE status):  Gilbert Serrato - Father (No MAURICE) Ph: [phone]      Upcoming Meetings and Dates/Important Information and next steps:  Coordinate care   Pt will need PD and COS at discharge.   Update internal referral tracker.  Pt will need psychiatry.     * Pt has meds in discharge pharmacy that he will need to get before discharge *      "

## 2025-07-30 NOTE — PROVIDER NOTIFICATION
07/29/25 2001   Observation Type   Are there critical medical needs? no   Are there suicide/self-harm concerns? no   Foreign body ingestion concerns? no   Are there other concerns for the patient causing or receiving harm? no   Harm Category none   No Harm Category Noted at This Time no applicable harm categories or justifications   Level of Special Monitoring 1

## 2025-07-30 NOTE — PLAN OF CARE
Problem: Sleep Disturbance  Goal: Adequate Sleep/Rest  Outcome: Progressing   Goal Outcome Evaluation::[id number]: Pt sleeping at the start of the shift in no apparent distress. Continue on suicide precautions without any related behavior noted.Safety maintained.Slept all night for 6.5 hours and currently still sleeping.

## 2025-07-31 PROCEDURE — 124N000002 HC R&B MH UMMC

## 2025-07-31 PROCEDURE — 250N000013 HC RX MED GY IP 250 OP 250 PS 637: Performed by: STUDENT IN AN ORGANIZED HEALTH CARE EDUCATION/TRAINING PROGRAM

## 2025-07-31 PROCEDURE — 99232 SBSQ HOSP IP/OBS MODERATE 35: CPT | Performed by: PSYCHIATRY & NEUROLOGY

## 2025-07-31 PROCEDURE — 250N000013 HC RX MED GY IP 250 OP 250 PS 637: Performed by: PSYCHIATRY & NEUROLOGY

## 2025-07-31 RX ORDER — HALOPERIDOL 5 MG/1
5 TABLET ORAL DAILY
Qty: 30 TABLET | Refills: 0 | Status: SHIPPED | OUTPATIENT
Start: 2025-08-01

## 2025-07-31 RX ORDER — OLANZAPINE 10 MG/1
10 TABLET, FILM COATED ORAL DAILY PRN
Qty: 30 TABLET | Refills: 0 | Status: SHIPPED | OUTPATIENT
Start: 2025-07-31

## 2025-07-31 RX ORDER — HYDROXYZINE PAMOATE 25 MG/1
25 CAPSULE ORAL 3 TIMES DAILY PRN
Qty: 90 CAPSULE | Refills: 0 | Status: SHIPPED | OUTPATIENT
Start: 2025-07-31

## 2025-07-31 RX ORDER — BENZTROPINE MESYLATE 0.5 MG/1
0.5 TABLET ORAL 2 TIMES DAILY
Qty: 60 TABLET | Refills: 0 | Status: SHIPPED | OUTPATIENT
Start: 2025-07-31

## 2025-07-31 RX ORDER — HALOPERIDOL 10 MG/1
10 TABLET ORAL AT BEDTIME
Qty: 30 TABLET | Refills: 0 | Status: SHIPPED | OUTPATIENT
Start: 2025-07-31

## 2025-07-31 RX ADMIN — NICOTINE POLACRILEX 4 MG: 2 GUM, CHEWING ORAL at 14:02

## 2025-07-31 RX ADMIN — HALOPERIDOL 5 MG: 5 TABLET ORAL at 09:06

## 2025-07-31 RX ADMIN — BENZTROPINE MESYLATE 0.5 MG: 0.5 TABLET ORAL at 21:04

## 2025-07-31 RX ADMIN — HALOPERIDOL 10 MG: 10 TABLET ORAL at 21:03

## 2025-07-31 RX ADMIN — NICOTINE POLACRILEX 4 MG: 2 GUM, CHEWING ORAL at 09:32

## 2025-07-31 RX ADMIN — BENZTROPINE MESYLATE 0.5 MG: 0.5 TABLET ORAL at 09:06

## 2025-07-31 ASSESSMENT — ACTIVITIES OF DAILY LIVING (ADL)
ADLS_ACUITY_SCORE: 23
HYGIENE/GROOMING: INDEPENDENT
ADLS_ACUITY_SCORE: 23

## 2025-07-31 NOTE — PLAN OF CARE
Joo appeared sleeping for approximately 6.5  hours without acute distress   No pain or discomfort endorsed tonight  No outburst or other inappropriate behavior this shift  No reported safety concern this shift       Problem: Adult Behavioral Health Plan of Care  Goal: Plan of Care Review  Outcome: Progressing  Goal: Adheres to Safety Considerations for Self and Others  Intervention: Develop and Maintain Individualized Safety Plan  Recent Flowsheet Documentation  Taken 7/31/2025 0244 by Lois Hernandez RN  Safety Measures: safety rounds completed     Problem: Sleep Disturbance  Goal: Adequate Sleep/Rest  Outcome: Progressing   Goal Outcome Evaluation:

## 2025-07-31 NOTE — PLAN OF CARE
Problem: Adult Inpatient Plan of Care  Goal: Readiness for Transition of Care  Outcome: Progressing     Problem: Suicide Risk  Goal: Absence of Self-Harm  Outcome: Progressing   Goal Outcome Evaluation:    Plan of Care Reviewed With: patient        Care of patient from [id number].  Patient flat, isolative, withdrawn. Patient spends time outside of meals in his room watching TV. Patient denies mental health symptoms or concerns. States he is just waiting for placement.

## 2025-07-31 NOTE — CARE PLAN
Patient care 1121-2023  Pt mostly withdrawn in his room before lunch. Paucity of words. Ate adequately for lunch. Prn nicotine gum given X1. Spent sometime consulting with . Will continue to monitor.

## 2025-07-31 NOTE — PLAN OF CARE
BEH IP Unit Acuity Rating Score (UARS)  Patient is given one point for every criteria they meet.    CRITERIA SCORING   On a 72 hour hold, court hold, committed, stay of commitment, or revocation. 1    Patient LOS on BEH unit exceeds 20 days. 1 LOS: 40   Patient under guardianship, 55+, otherwise medically complex, or under age 11. 0   Suicide ideation without relief of precipitating factors. 0   Current plan for suicide. 0   Current plan for homicide. 0   Imminent risk or actual attempt to seriously harm another without relief of factors precipitating the attempt. 0   Severe dysfunction in daily living (ex: complete neglect for self care, extreme disruption in vegetative function, extreme deterioration in social interactions). 0   Recent (last 7 days) or current physical aggression in the ED or on unit. 0   Restraints or seclusion episode in past 72 hours. 0   Recent (last 7 days) or current verbal aggression, agitation, yelling, etc., while in the ED or unit. 0   Active psychosis. 1   Need for constant or near constant redirection (from leaving, from others, etc).  0   Intrusive or disruptive behaviors. 0   Patient requires 3 or more hours of individualized nursing care per 8-hour shift (i.e. for ADLs, meds, therapeutic interventions). 0   TOTAL 3

## 2025-07-31 NOTE — DISCHARGE SUMMARY
"Psychiatric Discharge Summary    Joo Serrato MRN# [id number]   Age: 30 year old YOB: 1995     Date of Admission:  6/21/2025  Date of Discharge:  8/4/2025  Admitting Physician:  Jo-Ann Lainez MD  Discharge Physician:  Jo-Ann Lainez MD (Contact: [phone])         Event Leading to Hospitalization:   Per H&P:    30-year-old male living in [place] with his parents.  Single no children.  Has been on disability.     Chart reflects a psych admission 3/6/2025 reporting auditory hallucinations for 2 years, diagnosis of schizophrenia following with [facility], had missed appointments and other medications for 3 weeks.  He was given Seroquel discharged outpatient.     Chart reflects psychiatric admission 20/20 command hallucinations to take medications, took an overdose of mother's medications.     Emergency room notes from 620 indicate parents stated off his medications and decompensating over the last week.  They noted he had done Juma in the past on Invega intramuscular.     Patient reported bugs were crawling out of his throat, heard a pop and thoughts of things wrong with the spinal cord.  Concerned that a tooth have been implanted acting as a speaker.  Not doing well.  In the emergency room was agitated, demanding to leave, placed on a 72-hour hold at 9 PM on 6/20     Records reflect in 2023 had been on Invega 234 mg every 3 weeks and Seroquel 400 mg at bedtime.  Last note 4/23.     On interview he describes this is a \"misunderstanding\".  States been in the hospital 2 years ago.  Notes that he has been having no problems and would like to go home.     I inquired about statements made about a bug crawling out of his throat or a tooth being a speaker.  States those were not concerns.  Denies that he is hearing voices.  Reports his sleep is good appetite is good mood is fine.     Reports he has been taking Invega medication here.  He did not like the Invega injection stating it did " not work but he could not identify what it was supposed to help with.  Denies any has been working with [employer] or taking Seroquel recently.  Had been to [facility] in the past but does not want to go back there.     Denies use of cigarettes alcohol, other drugs.     Denies other active medical problems.          See Admission note by Joselo Bangura MD on 6/22/25 for additional details.          Diagnoses:     Schizophrenia, decompensated  Morbid obesity         Labs:     Recent Results (from the past 8 weeks)   Comprehensive metabolic panel    Collection Time: 06/20/25  6:23 AM   Result Value Ref Range    Sodium 136 135 - 145 mmol/L    Potassium 4.1 3.4 - 5.3 mmol/L    Carbon Dioxide (CO2) 28 22 - 29 mmol/L    Anion Gap 8 7 - 15 mmol/L    Urea Nitrogen 8.4 6.0 - 20.0 mg/dL    Creatinine 0.87 0.67 - 1.17 mg/dL    GFR Estimate >90 >60 mL/min/1.73m2    Calcium 9.3 8.8 - 10.4 mg/dL    Chloride 100 98 - 107 mmol/L    Glucose 103 (H) 70 - 99 mg/dL    Alkaline Phosphatase 80 40 - 150 U/L    AST 22 0 - 45 U/L    ALT 24 0 - 70 U/L    Protein Total 7.4 6.4 - 8.3 g/dL    Albumin 4.2 3.5 - 5.2 g/dL    Bilirubin Total 0.4 <=1.2 mg/dL   Troponin T, High Sensitivity    Collection Time: 06/20/25  6:23 AM   Result Value Ref Range    Troponin T, High Sensitivity <6 <=22 ng/L   Magnesium    Collection Time: 06/20/25  6:23 AM   Result Value Ref Range    Magnesium 2.0 1.7 - 2.3 mg/dL   Ethanol Level Blood    Collection Time: 06/20/25  6:23 AM   Result Value Ref Range    Ethanol Level Blood <0.01 <=0.01 g/dL   CBC with platelets and differential    Collection Time: 06/20/25  6:23 AM   Result Value Ref Range    WBC Count 9.9 4.0 - 11.0 10e3/uL    RBC Count 5.34 4.40 - 5.90 10e6/uL    Hemoglobin 16.7 13.3 - 17.7 g/dL    Hematocrit 48.0 40.0 - 53.0 %    MCV 90 78 - 100 fL    MCH 31.3 26.5 - 33.0 pg    MCHC 34.8 31.5 - 36.5 g/dL    RDW 12.7 10.0 - 15.0 %    Platelet Count 282 150 - 450 10e3/uL    % Neutrophils 41 %    % Lymphocytes  45 %    % Monocytes 8 %    % Eosinophils 5 %    % Basophils 1 %    % Immature Granulocytes 1 %    NRBCs per 100 WBC 0 <1 /100    Absolute Neutrophils 4.1 1.6 - 8.3 10e3/uL    Absolute Lymphocytes 4.4 0.8 - 5.3 10e3/uL    Absolute Monocytes 0.8 0.0 - 1.3 10e3/uL    Absolute Eosinophils 0.5 0.0 - 0.7 10e3/uL    Absolute Basophils 0.1 0.0 - 0.2 10e3/uL    Absolute Immature Granulocytes 0.1 <=0.4 10e3/uL    Absolute NRBCs 0.0 10e3/uL   Urine Drug Screen Panel    Collection Time: 06/20/25  6:23 AM   Result Value Ref Range    Amphetamines Urine Screen Negative Screen Negative    Barbituates Urine Screen Negative Screen Negative    Benzodiazepine Urine Screen Negative Screen Negative    Cannabinoids Urine Screen Negative Screen Negative    Cocaine Urine Screen Negative Screen Negative    Fentanyl Qual Urine Screen Negative Screen Negative    Opiates Urine Screen Negative Screen Negative    PCP Urine Screen Negative Screen Negative   RBC and Platelet Morphology    Collection Time: 06/20/25  6:23 AM   Result Value Ref Range    RBC Morphology Confirmed RBC Indices     Platelet Assessment  Automated Count Confirmed. Platelet morphology is normal.     Automated Count Confirmed. Platelet morphology is normal.   Hemoglobin A1c    Collection Time: 06/22/25  7:56 AM   Result Value Ref Range    Estimated Average Glucose 111 <117 mg/dL    Hemoglobin A1C 5.5 <5.7 %   Lipid Profile    Collection Time: 06/22/25  7:56 AM   Result Value Ref Range    Cholesterol 177 <200 mg/dL    Triglycerides 222 (H) <150 mg/dL    Direct Measure HDL 27 (L) >=40 mg/dL    LDL Cholesterol Calculated 106 (H) <100 mg/dL    Non HDL Cholesterol 150 (H) <130 mg/dL   Comprehensive metabolic panel    Collection Time: 07/11/25 10:35 AM   Result Value Ref Range    Sodium 135 135 - 145 mmol/L    Potassium 4.2 3.4 - 5.3 mmol/L    Carbon Dioxide (CO2) 23 22 - 29 mmol/L    Anion Gap 10 7 - 15 mmol/L    Urea Nitrogen 9.9 6.0 - 20.0 mg/dL    Creatinine 0.83 0.67 - 1.17  mg/dL    GFR Estimate >90 >60 mL/min/1.73m2    Calcium 9.0 8.8 - 10.4 mg/dL    Chloride 102 98 - 107 mmol/L    Glucose 96 70 - 99 mg/dL    Alkaline Phosphatase 66 40 - 150 U/L    AST 25 0 - 45 U/L    ALT 22 0 - 70 U/L    Protein Total 7.1 6.4 - 8.3 g/dL    Albumin 4.1 3.5 - 5.2 g/dL    Bilirubin Total 0.3 <=1.2 mg/dL   CBC with platelets and differential    Collection Time: 07/11/25 10:35 AM   Result Value Ref Range    WBC Count 9.0 4.0 - 11.0 10e3/uL    RBC Count 4.96 4.40 - 5.90 10e6/uL    Hemoglobin 15.6 13.3 - 17.7 g/dL    Hematocrit 45.2 40.0 - 53.0 %    MCV 91 78 - 100 fL    MCH 31.5 26.5 - 33.0 pg    MCHC 34.5 31.5 - 36.5 g/dL    RDW 12.4 10.0 - 15.0 %    Platelet Count 250 150 - 450 10e3/uL    % Neutrophils 55 %    % Lymphocytes 30 %    % Monocytes 10 %    % Eosinophils 3 %    % Basophils 1 %    % Immature Granulocytes 0 %    NRBCs per 100 WBC 0 <1 /100    Absolute Neutrophils 5.0 1.6 - 8.3 10e3/uL    Absolute Lymphocytes 2.7 0.8 - 5.3 10e3/uL    Absolute Monocytes 0.9 0.0 - 1.3 10e3/uL    Absolute Eosinophils 0.3 0.0 - 0.7 10e3/uL    Absolute Basophils 0.1 0.0 - 0.2 10e3/uL    Absolute Immature Granulocytes 0.0 <=0.4 10e3/uL    Absolute NRBCs 0.0 10e3/uL          Consults:   No consultations were requested during this admission         Hospital Course:   Joo Serrato was [facility] to [facility] with attending Jo-Ann Lainez MD on a 72 hour mental health hold. The patient was placed under status 15 (15 minute checks) to ensure patient safety.     Filed for MI commitment with [facility] on 6/23. PTA Invega 3 mg daily was restarted on admission. Pt accepted, but would not agree to optimize further despite severe symptoms of psychosis. Rios was approved on 7/11 at which time oral Invega was gradually increased to a total of 12 mg daily, achieved on 7/18. Invega Sustenna 234 mg was administered on 7/14 and 156 mg on 7/21. He tolerated Invega well without reported side effects, but symptoms of  psychosis persisted (still concerned about being infested with leeches and monitored by [employer], frequently calling 911 from unit phone). For this reason, Haldol was started on 7/24 along with Cogentin 0.5 mg BID. It was increased to current dose of 15 mg total daily on 7/25. Since it was added, significant improvements in psychosis have been observed. Pt no longer distressed by delusional thought content and has demonstrated improved insight and awareness into MH condition. No evidence of agitation or aggressive behavior in several days. I discussed my recommendation to start Haldol Dec, though pt is adamantly opposed and promised to take Haldol orally. He is open to switching from Invega Sustenna to Haldol Dec gradually, which I feel is reasonable as he has had a positive response to Haldol c/t Invega. He had several days of stability while awaiting IRTS placement, but wait lists were long. Parents reported observed significant improvements and are comfortable with patient returning home while awaiting IRTS. No safety concerns in several days.     Joo Serrato did participate in groups and was visible in the milieu.     The patient's symptoms of psychosis improved.     Joo Serrato was released to IRTS facility. At the time of discharge Joo Serrato was determined to not be a danger to himself or others.     RISK ASSESSMENT:  Today Joo Serrato denies SI, SIB, and HI. No overt evidence of psychosis or neyda observed. Patient grossly appears to be cognitively intact. Patient has not exhibited any aggressive or violent behaviors since admission. He has notable risk factors for self-harm including male and relatively poor insight at baseline.  However, risk is mitigated by no h/o suicide attempt, no plan or intent, no h/o risky impulsive behavior, no access to lethal means, describes a safety plan, h/o seeking help when needed, symptom improvement, future oriented, feeling hopeful, none to  minimal alcohol use , commitment to family, good social support  , and IRTS placement, DWYER, committment. Patient does not have access to firearms. Based on all available evidence he does not appear to be at imminent risk for self-harm therefore does not meet criteria for a 72-hr hold/ involuntary hospitalization.  However, based on degree of symptoms therapy, close psych FU, and IRTS placement was recommended which the pt did agree to. Patient also agreed to call 911/present to ED if any imminent safety concerns arise, including emergence of SI, HI, symptoms of psychosis or neyda. Patient was provided with crisis resources at the time of discharge. Patient agreed to further reduce risk of self-harm by completely abstaining from illicit substances and alcohol, and agreed to remain medication adherent. Expressed understanding of the risks associated with excessive alcohol use, illicit substance use, and medication/treatment non-adherence, including increased risk of harm to self or others.             Discharge Medications:     Discharge Medication List as of 8/1/2025  9:54 AM        START taking these medications    Details   benztropine (COGENTIN) 0.5 MG tablet Take 1 tablet (0.5 mg) by mouth 2 times daily., Disp-60 tablet, R-0, E-Prescribe      !! haloperidol (HALDOL) 10 MG tablet Take 1 tablet (10 mg) by mouth at bedtime., Disp-30 tablet, R-0, E-Prescribe      !! haloperidol (HALDOL) 5 MG tablet Take 1 tablet (5 mg) by mouth daily., Disp-30 tablet, R-0, E-Prescribe      melatonin 3 MG tablet Take 1 tablet (3 mg) by mouth nightly as needed for sleep., Disp-30 tablet, R-0, E-Prescribe      nicotine (NICORETTE) 2 MG gum Place 1-2 each (2-4 mg) inside cheek every hour as needed for nicotine withdrawal symptoms., Disp-220 each, R-0, E-Prescribe      OLANZapine (ZYPREXA) 10 MG tablet Take 1 tablet (10 mg) by mouth daily as needed (agitation)., Disp-30 tablet, R-0, E-Prescribe       !! - Potential duplicate medications  found. Please discuss with provider.     Federico Sustenna 234 mg qmonthly, due 8/20.      CONTINUE these medications which have CHANGED    Details   hydrOXYzine omer (VISTARIL) 25 MG capsule Take 1 capsule (25 mg) by mouth 3 times daily as needed for anxiety., Disp-90 capsule, R-0, E-Prescribe           STOP taking these medications       QUEtiapine (SEROQUEL) 200 MG tablet Comments:   Reason for Stopping:                       Psychiatric Examination:   Appearance:  awake, alert and adequately groomed  Attitude:  cooperative  Eye Contact:  good  Mood:  good  Affect:  mood congruent and constricted mobility  Speech:  clear, coherent  Psychomotor Behavior:  no evidence of tardive dyskinesia, dystonia, or tics  Thought Process:  logical, linear, and goal oriented  Associations:  no loose associations  Thought Content:  no evidence of suicidal ideation or homicidal ideation and no evidence of psychotic thought  Insight:  limited  Judgment:  intact  Oriented to:  time, person, and place  Attention Span and Concentration:  intact  Recent and Remote Memory:  intact  Language: Able to name objects, Able to repeat phrases, and Able to read and write  Fund of Knowledge: appropriate  Muscle Strength and Tone: normal  Gait and Station: Normal         Discharge Plan:   Summary: You were admitted on 6/21/2025  due to Schizophrenia.  You were treated by Jo-Ann Lainez MD and discharged on 8/4/25 from [facility] to Home while awaiting IRTS.      Main Diagnosis: Schizophrenia, decompensated at time of admission.      Commitment: You were committed to the Commissioner of [facility] Services and you are being discharged on a Provisional Discharge Agreement which shall remain in effect for the duration of the Commitment and Rios: You are also court ordered to take the medications that the doctor ordered for you.      Health Care Follow-up:      PSYCHIATRY:  [facility] phone lines are down at the moment. [facility] will call you tomorrow with an update  appt time including Invega injection appt date.   [facility] will not accept you back for 2 years.     PCP:  Appointment: Primary Care  Date/time: Tuesday August 12th, 2025 @ 9:45 AM In person  Provider:  Josue Daniels DO  Address: [address] [facility] [address]  Phone: [phone]      Patient Navigation Hub:   [facility] Navigators work to be your point-of-contact for trustworthy and compassionate care from Inpatient services to [facility] Programmatic Care. We will provide resources and communication to help guide you into programmatic care. Ultimately, our goal is to be the one-stop-shop of communication, coordination, and support for your journey to programmatic care.    Phone: [phone]     Information will be faxed to your outpatient providers to ensure a healthy continuity of care for you.      Attend all scheduled appointments with your outpatient providers. Call at least 24 hours in advance if you need to reschedule an appointment to ensure continued access to your outpatient providers.      Major Treatments, Procedures and Findings:  You were provided with: a psychiatric assessment, assessed for medical stability, medication evaluation and/or management, group therapy, individual therapy, milieu management, and medical interventions     Symptoms to Report: feeling more aggressive, increased confusion, losing more sleep, mood getting worse, or thoughts of suicide, if you feel your meds aren't working.     Early warning signs can include: increased depression or anxiety sleep disturbances increased thoughts or behaviors of suicide or self-harm  increased unusual thinking, such as paranoia or hearing voices     Safety and Wellness:  Take all medicines as directed.  Make no changes unless your doctor suggests them.      Follow treatment recommendations.  Refrain from alcohol and non-prescribed drugs.  Ask your support system  to help you reduce your access to items that could harm yourself or others. If there is a concern for safety, call 911.     Resources:   Mental Health Crisis Resources  Throughout [place]: call **CRISIS (**211810)  Crisis Text Line: is available for free, 24/7 by texting [place] to 155855  Suicide Awareness Voices of Education (SAVE) (www.save.org): 170-864-GRXP (4214)  The National Suicide Prevention Lifeline is now: 988 Suicide and Crisis Lifeline. Call 988 anytime.  National [place] on Mental Illness (www.[place].naresh.org): 742.691.7678 or 509-685-0302.  Npwa2ziiv: text the word LIFE to 65145 for immediate support and crisis intervention  Mental Health Consumer/Survivor Network of [place] (www.mhcsn.net): 371.770.7436 or 824-725-9761  Mental Health Association of [place] (www.mentalhealth.org): 385.928.5359 or 351-909-4832  Peer Support Connection [place] [facility] (PSC) 1-528.620.3695 Available from 5pm - 9am (7 days a week/365 days a year)  Call or Text 777, [facility] [phone], [facility] [phone], [facility] [phone] [facility], [facility] [phone] [facility], [facility] [phone], [facility] [phone], or [facility] [phone] [facility]        General Medication Instructions:   See your medication sheet(s) for instructions.   Take all medicines as directed.  Make no changes unless your doctor suggests them.   Go to all your doctor visits.  Be sure to have all your required lab tests. This way, your medicines can be refilled on time.  Do not use any drugs not prescribed by your doctor.  Avoid alcohol.     Advance Directives:   Scanned document on file with [place]? No scanned doc  Is document scanned? Pt states no documents  Honoring Choices Your Rights Handout: Informed and given  Was more information offered? Materials given     The Treatment team has appreciated the opportunity to work with  you. If you have any questions or concerns about your recent admission, you can contact the unit which can receive your call 24 hours a day, 7 days a week. They will be able to get in touch with a Provider if needed. The unit number is [phone].    Attestation:  The patient has been seen and evaluated by me,  Jo-Ann Lainez MD    > 50 minutes total time that was spent and over 50% of this time was spent in counseling and coordination of care with staff, reviewing medical record, educating patient about treatment options, side effects and benefits and alternative treatments for medications, providing supportive therapy and redirection regarding above symptoms.     This document is created with the help of Dragon dictation system.  All grammatical/typing errors or context distortion are unintentional and inherent to software.

## 2025-07-31 NOTE — PROGRESS NOTES
[facility], [place]   Psychiatric Progress Note  Hospital Day: 40        Interim History:   The patient's care was discussed with the treatment team during the daily team meeting and/or staff's chart notes were reviewed.     Sleep: 6.5 hours (07/31/25 0644)  Scheduled Medications: took all scheduled medications as prescribed   PRN medications:   Last 24H PRN:     nicotine (NICORETTE) gum 2-4 mg, 4 mg at 07/30/25 1346    OLANZapine (zyPREXA) tablet 10 mg, 10 mg at 07/30/25 1346 **OR** OLANZapine (zyPREXA) injection 10 mg   Staff report: No acute safety concerns. See staff notes for additional details.     Today, Joo has no concerns and feels stable for discharge. No overt sx of psychosis noted during interview. Has no concerns about infestation or FBI monitoring him.     Suicidal ideation: denies current or recent suicidal ideation or behaviors.    Homicidal ideation: denies current or recent homicidal ideation or behaviors.    Psychotic symptoms:  As above    Medication side effects reported: No significant side effects. Denies constipation.     Acute medical concerns: yes, as above    Other issues reported by patient: Patient had no further questions or concerns.           Medications:     Current Facility-Administered Medications   Medication Dose Route Frequency Provider Last Rate Last Admin    benztropine (COGENTIN) tablet 0.5 mg  0.5 mg Oral BID Jo-Ann Lainez MD   0.5 mg at 07/30/25 2025    haloperidol (HALDOL) tablet 10 mg  10 mg Oral At Bedtime Gabrielle Lechuga MD   10 mg at 07/30/25 2105    Or    haloperidol lactate (HALDOL) injection 10 mg  10 mg Intramuscular At Bedtime Gabrielle Lechuga MD        haloperidol (HALDOL) tablet 5 mg  5 mg Oral Daily Gabrielle Lechuga MD   5 mg at 07/30/25 0818    Or    haloperidol lactate (HALDOL) injection 5 mg  5 mg Intramuscular Daily Gabrielle Lechuga MD              Allergies:     Allergies   Allergen Reactions    No Known Drug Allergy  "          Labs:   No results found for this or any previous visit (from the past 24 hours).       Psychiatric Examination:     /66   Pulse 68   Temp 98  F (36.7  C) (Oral)   Resp 17   SpO2 97%   Weight is 0 lbs 0 oz  There is no height or weight on file to calculate BMI.    Weight over time:  There were no vitals filed for this visit.    Orthostatic Vitals         Most Recent      Sitting Orthostatic /75 07/21 0844    Sitting Orthostatic Pulse (bpm) 97 07/21 0844       /73   Pulse 95   Temp 97.5  F (36.4  C) (Temporal)   Resp 16   Ht 1.803 m (5' 11\")   Wt 133.4 kg (294 lb 1.6 oz)   SpO2 97%   BMI 41.02 kg/m    Weight is 294 lbs 1.6 oz  Body mass index is 41.02 kg/m .    Weight over time:  Vitals:    07/22/25 1635 07/29/25 0832   Weight: 134.5 kg (296 lb 9.6 oz) 133.4 kg (294 lb 1.6 oz)       Orthostatic Vitals         Most Recent      Sitting Orthostatic /75 07/21 0844    Sitting Orthostatic Pulse (bpm) 97 07/21 0844              Cardiometabolic risk assessment. 07/31/2025    Reviewed patient profile for cardiometabolic risk factors    Date taken / Value  REFERENCE RANGE   Abdominal Obesity  (Waist Circumference)   See nursing flowsheet Women >=35 in (88 cm)   Men >=40 in (102 cm)      Triglycerides  Triglycerides   Date Value Ref Range Status   06/22/2025 222 (H) <150 mg/dL Final      HDL cholesterol  Direct Measure HDL   Date Value Ref Range Status   06/22/2025 27 (L) >=40 mg/dL Final   ]   Fasting plasma glucose (FPG) Glucose   Date Value Ref Range Status   07/11/2025 96 70 - 99 mg/dL Final   10/02/2020 80 70 - 99 mg/dL Final      Blood pressure  Blood Pressure  07/30/25 : 106/73  06/21/25 : 121/77  03/06/25 : 142/86   Blood pressure >=130/85 mmHg or treatment for elevated blood pressure   Family History  See family history     Mental Status Exam:  Appearance: awake, alert and well groomed  Attitude:  cooperative  Eye Contact: good  Mood: good  Affect:  mood congruent, " "constricted mobility  Speech:  clear, coherent  Language: fluent and intact in English  Psychomotor, Gait, Musculoskeletal:  no evidence of tardive dyskinesia, dystonia, or tics  Throught Process:  linear, goal oriented  Associations:  no loose associations  Thought Content:  no evidence of suicidal ideation or homicidal ideation and no overt symptoms of psychosis today  Insight:  limited though ongoing improvements noted  Judgement: fair  Oriented to:  time, person, and place  Attention Span and Concentration:  fair  Recent and Remote Memory:  fair  Fund of Knowledge:  low-normal           Precautions:     Behavioral Orders   Procedures    Code 1 - Restrict to Unit    Routine Programming     As clinically indicated    Status 15     Every 15 minutes.          Diagnoses:     Schizophrenia, decompensated    Clinically Significant Risk Factors    # Morbid Obesity: Estimated body mass index is 41.02 kg/m  as calculated from the following:    Height as of this encounter: 1.803 m (5' 11\").    Weight as of this encounter: 133.4 kg (294 lb 1.6 oz).      # Financial/Environmental Concerns:               Assessment & Plan:     Assessment and hospital summary:  30-year-old male brought to the emergency room by parents, decompensating with delusional statements and auditory hallucinations, noncompliant with meds. Does not believe he has a mental illness. Family expressing concerns about safety. Filed for MI commitment and Rios on 6/23. Pt now fully committed with Rios in place.     Today's Changes:  Continue current medications without changes. Some improvements noted since starting Haldol.   Joo understands that recommendation is to start Haldol Dec prior to discharge, but is adamantly opposed due to his fear of needles. He stated that he will take oral medications as prescribed per Rios order.     Medications:  - Haldol 5 mg daily and 10 mg at bedtime backed up with Haldol IM  - Paliperidone PO 12 mg daily (most " recent increase was on 7/18)  - Invega Sustenna 234mg given on 7/14 and 156mg administered 7/21  - Benztropine 0.5mg BID to prevent against possible EPSE    Risks, benefits, and alternatives discussed at length with patient.     Acute Medical Problems and Treatments:  Acute medical concerns:  No acute medical concerns    Pertinent labs/imaging:  Dyslipidemia noted on lipid profile dated 6/22  CBC with diff and CMP checked on 7/11 and are wnl    Behavioral/Psychological/Social:  - Encourage unit programming    Safety:  - Continue precautions as noted above  - Status 15 minute checks    Legal Status: MI civilly committed with Rios    Disposition Plan   Reason for ongoing admission: is unable to care for self due to severe psychosis or neyda  Discharge location: IRTS  Discharge Medications: not ordered  Follow-up Appointments: not scheduled    Nancy Lainez MD  [facility]     07/31/2025

## 2025-07-31 NOTE — PLAN OF CARE
Team Note Due:  Monday     Assessment/Intervention/Current Symtoms and Care Coordination:  Chart review, and team. Pt slept 6.5 hours and has had no behavioral concerns.   -Pt spoke with [facility] about him wanting to discharge, [facility] informed pt that we are finding an IRTS referral.   -[facility] contacted [facility] and left a VM inquiring about referral.   -[facility] they reviewed the referral to central access and has sent to IRT assessors.   -[facility] don't have current availability and he is fifth on the wait list.   -Bryan Mendez will contact [facility] in a short while with an update. They have yet to receive the referral for pt. Fax number [phone]   - Writer spoke with pt who is getting inpatient about his discharge. PT wants to request that he be discharged to his parents home while waiting for IRTS placement. Writer told pt she would relay message to provider and assigned .      Discharge Plan or Goal:  IRTS     Barriers to Discharge:  Symptoms     Referral Status:  7/30 [facility] - all locations  7/30 [facility] - all locations  7/30 [facility] - contact is: Ravi Sanders   7/30 [facility]  7/30 [facility] -  I called [phone] to ask about their IRTS referral process and they wanted it submitted online through a secure portal. IRTS referral was submitted online through the secure portal. They stated the waitlist is about 4 weeks.      Legal Status:  [facility]  File Number: [id number]   Start date: 7/11/25  Rios meds: Haldol, Prolixin, Clozaril, Risperdal, Zyprexa, Invega     PPS: Sindi Montes [phone]    Contacts (include MAURICE status):  Gilbert Serrato - Father (No MAURICE) Ph: [phone]      Upcoming Meetings and Dates/Important Information and next steps:  Coordinate care   Pt will need PD and COS at discharge.   Update internal referral tracker.  Pt will need psychiatry.     * Pt has meds in discharge pharmacy that he will  need to get before discharge *

## 2025-08-01 PROCEDURE — 99232 SBSQ HOSP IP/OBS MODERATE 35: CPT | Performed by: PSYCHIATRY & NEUROLOGY

## 2025-08-01 PROCEDURE — 250N000013 HC RX MED GY IP 250 OP 250 PS 637: Performed by: STUDENT IN AN ORGANIZED HEALTH CARE EDUCATION/TRAINING PROGRAM

## 2025-08-01 PROCEDURE — 250N000013 HC RX MED GY IP 250 OP 250 PS 637: Performed by: PSYCHIATRY & NEUROLOGY

## 2025-08-01 PROCEDURE — 124N000002 HC R&B MH UMMC

## 2025-08-01 RX ADMIN — HALOPERIDOL 5 MG: 5 TABLET ORAL at 08:06

## 2025-08-01 RX ADMIN — OLANZAPINE 10 MG: 10 TABLET, FILM COATED ORAL at 12:11

## 2025-08-01 RX ADMIN — BENZTROPINE MESYLATE 0.5 MG: 0.5 TABLET ORAL at 20:15

## 2025-08-01 RX ADMIN — NICOTINE POLACRILEX 4 MG: 2 GUM, CHEWING ORAL at 08:06

## 2025-08-01 RX ADMIN — BENZTROPINE MESYLATE 0.5 MG: 0.5 TABLET ORAL at 08:06

## 2025-08-01 RX ADMIN — HALOPERIDOL 10 MG: 10 TABLET ORAL at 20:15

## 2025-08-01 ASSESSMENT — ACTIVITIES OF DAILY LIVING (ADL)
ADLS_ACUITY_SCORE: 23
ORAL_HYGIENE: INDEPENDENT
ADLS_ACUITY_SCORE: 23
DRESS: INDEPENDENT
ADLS_ACUITY_SCORE: 23
HYGIENE/GROOMING: INDEPENDENT
ADLS_ACUITY_SCORE: 23
LAUNDRY: UNABLE TO COMPLETE
ORAL_HYGIENE: INDEPENDENT
ADLS_ACUITY_SCORE: 23
HYGIENE/GROOMING: INDEPENDENT
ADLS_ACUITY_SCORE: 23
DRESS: INDEPENDENT

## 2025-08-01 NOTE — PLAN OF CARE
Goal Outcome Evaluation:    Plan of Care Reviewed With: patient        Pt isolative and withdrawn in his room most of the shift and came out for meals. Pt denied all psych mental health symptoms and committed for safety. Pt described mood as ok and presented with flat affect, calm and polite. Complied with medications and no side effect observed or reported.   Adequate fluids and food intake, voiding freely and no BM concern per pt. Pt denied pain and shortness of breath and refused vital sign.

## 2025-08-01 NOTE — PLAN OF CARE
Joo noted to have slept for 6.5 hours without  respiratory problems   Continue on 15 minutes  safety checks without noted problems   No pain or discomfort endorsed  No aggressive behaviors demonstrated.     Problem: Adult Inpatient Plan of Care  Goal: Plan of Care Review  Description: The Plan of Care Review/Shift note should be completed every shift.  The Outcome Evaluation is a brief statement about your assessment that the patient is improving, declining, or no change.  This information will be displayed automatically on your shift  note.  Outcome: Progressing  Goal: Absence of Hospital-Acquired Illness or Injury  Intervention: Prevent Skin Injury  Recent Flowsheet Documentation  Taken 8/1/2025 0512 by Lois Hernandez RN  Body Position:   position changed independently   position maintained     Problem: Adult Behavioral Health Plan of Care  Goal: Adheres to Safety Considerations for Self and Others  Intervention: Develop and Maintain Individualized Safety Plan  Recent Flowsheet Documentation  Taken 8/1/2025 0512 by Lois Hernandez RN  Safety Measures: safety rounds completed   Goal Outcome Evaluation:

## 2025-08-01 NOTE — PLAN OF CARE
BEH IP Unit Acuity Rating Score (UARS)  Patient is given one point for every criteria they meet.    CRITERIA SCORING   On a 72 hour hold, court hold, committed, stay of commitment, or revocation. 1    Patient LOS on BEH unit exceeds 20 days. 1 LOS: 41   Patient under guardianship, 55+, otherwise medically complex, or under age 11. 0   Suicide ideation without relief of precipitating factors. 0   Current plan for suicide. 0   Current plan for homicide. 0   Imminent risk or actual attempt to seriously harm another without relief of factors precipitating the attempt. 0   Severe dysfunction in daily living (ex: complete neglect for self care, extreme disruption in vegetative function, extreme deterioration in social interactions). 0   Recent (last 7 days) or current physical aggression in the ED or on unit. 0   Restraints or seclusion episode in past 72 hours. 0   Recent (last 7 days) or current verbal aggression, agitation, yelling, etc., while in the ED or unit. 0   Active psychosis. 1   Need for constant or near constant redirection (from leaving, from others, etc).  0   Intrusive or disruptive behaviors. 0   Patient requires 3 or more hours of individualized nursing care per 8-hour shift (i.e. for ADLs, meds, therapeutic interventions). 0   TOTAL 3

## 2025-08-01 NOTE — PLAN OF CARE
"Problem: Psychotic Signs/Symptoms  Goal: Improved Behavioral Control (Psychotic Signs/Symptoms)  Outcome: Progressing  Patient is alert and oriented x3 with a flat affect. Patient is calm and compliant with vitals check and medication administration. Patient was present in the lounge area, attended group, but he was not social with peer. Patient took a shower during this shift, and ate breakfast and lunch in the lounge without issues. Writer did not hear patient talk about the FBI or police and when writer asked if he is still worried about the FBI, he replied \"no, am not\". He endorsed agitation stating \"being here this long is causing me some agitation\" Patient denies all other psych symptoms of, SI, HI, A/V hallucinations, anxiety and depression. Patient did not complain of pain and no prn was given for pain. Patient did not have behavioral issues this shift. Patient wants to know when he is leaving and he hope it will be soon.     /77   Pulse 69   Temp 97.5  F (36.4  C) (Temporal)   Resp 16   Ht 1.803 m (5' 11\")   Wt 133.4 kg (294 lb 1.6 oz)   SpO2 99%   BMI 41.02 kg/m       "

## 2025-08-01 NOTE — PROGRESS NOTES
"[facility], [place]   Psychiatric Progress Note  Hospital Day: 41        Interim History:   The patient's care was discussed with the treatment team during the daily team meeting and/or staff's chart notes were reviewed.     Sleep: 6.5 hours (08/01/25 0654)  Scheduled Medications: took all scheduled medications as prescribed   PRN medications:   Last 24H PRN:     nicotine (NICORETTE) gum 2-4 mg, 4 mg at 08/01/25 0806   Staff report: No acute safety concerns. See staff notes for additional details.     Today, Joo has no concerns and feels stable for discharge. No overt sx of psychosis noted during interview. Has no concerns about infestation or FBI monitoring him. When asked specifically about the FBI monitoring him, he said \"Nah, I am over that.\" When asked whether it could have been related to untreated MI, he replied \"Yeah, pretty much anything is possible.\" He agrees that he is much less concerned about FBI monitoring since Haldol was initiated. Tolerating well.     Suicidal ideation: denies current or recent suicidal ideation or behaviors.    Homicidal ideation: denies current or recent homicidal ideation or behaviors.    Psychotic symptoms:  As above    Medication side effects reported: No significant side effects. Denies constipation.     Acute medical concerns: yes, as above    Other issues reported by patient: Patient had no further questions or concerns.           Medications:     Current Facility-Administered Medications   Medication Dose Route Frequency Provider Last Rate Last Admin    benztropine (COGENTIN) tablet 0.5 mg  0.5 mg Oral BID Jo-Ann Lainez MD   0.5 mg at 08/01/25 0806    haloperidol (HALDOL) tablet 10 mg  10 mg Oral At Bedtime Gabrielle Lechuga MD   10 mg at 07/31/25 2103    Or    haloperidol lactate (HALDOL) injection 10 mg  10 mg Intramuscular At Bedtime Gabrielle Lechuga MD        haloperidol (HALDOL) tablet 5 mg  5 mg Oral Daily Gabrielle Lechuga MD   " "5 mg at 08/01/25 0806    Or    haloperidol lactate (HALDOL) injection 5 mg  5 mg Intramuscular Daily Gabrielle Lechuga MD              Allergies:     Allergies   Allergen Reactions    No Known Drug Allergy           Labs:   No results found for this or any previous visit (from the past 24 hours).       Psychiatric Examination:     /66   Pulse 68   Temp 98  F (36.7  C) (Oral)   Resp 17   SpO2 97%   Weight is 0 lbs 0 oz  There is no height or weight on file to calculate BMI.    Weight over time:  There were no vitals filed for this visit.    Orthostatic Vitals         Most Recent      Sitting Orthostatic /75 07/21 0844    Sitting Orthostatic Pulse (bpm) 97 07/21 0844       /77   Pulse 69   Temp 97.5  F (36.4  C) (Temporal)   Resp 16   Ht 1.803 m (5' 11\")   Wt 133.4 kg (294 lb 1.6 oz)   SpO2 99%   BMI 41.02 kg/m    Weight is 294 lbs 1.6 oz  Body mass index is 41.02 kg/m .    Weight over time:  Vitals:    07/22/25 1635 07/29/25 0832   Weight: 134.5 kg (296 lb 9.6 oz) 133.4 kg (294 lb 1.6 oz)       Orthostatic Vitals         Most Recent      Sitting Orthostatic /75 07/21 0844    Sitting Orthostatic Pulse (bpm) 97 07/21 0844              Cardiometabolic risk assessment. 08/01/2025    Reviewed patient profile for cardiometabolic risk factors    Date taken / Value  REFERENCE RANGE   Abdominal Obesity  (Waist Circumference)   See nursing flowsheet Women >=35 in (88 cm)   Men >=40 in (102 cm)      Triglycerides  Triglycerides   Date Value Ref Range Status   06/22/2025 222 (H) <150 mg/dL Final      HDL cholesterol  Direct Measure HDL   Date Value Ref Range Status   06/22/2025 27 (L) >=40 mg/dL Final   ]   Fasting plasma glucose (FPG) Glucose   Date Value Ref Range Status   07/11/2025 96 70 - 99 mg/dL Final   10/02/2020 80 70 - 99 mg/dL Final      Blood pressure  Blood Pressure  08/01/25 : 104/77  06/21/25 : 121/77  03/06/25 : 142/86   Blood pressure >=130/85 mmHg or treatment for elevated blood " "pressure   Family History  See family history     Mental Status Exam:  Appearance: awake, alert and well groomed  Attitude:  cooperative  Eye Contact: good  Mood: good  Affect:  mood congruent, constricted mobility  Speech:  clear, coherent  Language: fluent and intact in English  Psychomotor, Gait, Musculoskeletal:  no evidence of tardive dyskinesia, dystonia, or tics  Throught Process:  linear, goal oriented  Associations:  no loose associations  Thought Content:  no evidence of suicidal ideation or homicidal ideation and no overt symptoms of psychosis today  Insight:  limited though ongoing improvements noted  Judgement: fair  Oriented to:  time, person, and place  Attention Span and Concentration:  fair  Recent and Remote Memory:  fair  Fund of Knowledge:  low-normal           Precautions:     Behavioral Orders   Procedures    Code 1 - Restrict to Unit    Routine Programming     As clinically indicated    Status 15     Every 15 minutes.          Diagnoses:     Schizophrenia, decompensated    Clinically Significant Risk Factors    # Morbid Obesity: Estimated body mass index is 41.02 kg/m  as calculated from the following:    Height as of this encounter: 1.803 m (5' 11\").    Weight as of this encounter: 133.4 kg (294 lb 1.6 oz).      # Financial/Environmental Concerns:               Assessment & Plan:     Assessment and hospital summary:  30-year-old male brought to the emergency room by parents, decompensating with delusional statements and auditory hallucinations, noncompliant with meds. Does not believe he has a mental illness. Family expressing concerns about safety. Filed for MI commitment and Rios on 6/23. Pt now fully committed with Rios in place.     Today's Changes:  Continue current medications without changes. Significant improvements noted since starting Haldol.   Joo understands that recommendation is to start Haldol Dec prior to discharge, but is adamantly opposed due to his fear of needles. " He stated that he will take oral medications as prescribed per Rios order. He is stable for discharge to IRTS. Awaiting placement.     Medications:  - Haldol 5 mg daily and 10 mg at bedtime backed up with Haldol IM  - Paliperidone PO 12 mg daily (most recent increase was on 7/18)  - Invega Sustenna 234mg given on 7/14 and 156mg administered 7/21  - Benztropine 0.5mg BID to prevent against possible EPSE    Risks, benefits, and alternatives discussed at length with patient.     Acute Medical Problems and Treatments:  Acute medical concerns:  No acute medical concerns    Pertinent labs/imaging:  Dyslipidemia noted on lipid profile dated 6/22  CBC with diff and CMP checked on 7/11 and are wnl    Behavioral/Psychological/Social:  - Encourage unit programming    Safety:  - Continue precautions as noted above  - Status 15 minute checks    Legal Status: MI civilly committed with Rios    Disposition Plan   Reason for ongoing admission: is unable to care for self due to severe psychosis or neyda  Discharge location: IRTS  Discharge Medications: not ordered  Follow-up Appointments: not scheduled    Nancy Lainez MD  [facility]     08/01/2025

## 2025-08-01 NOTE — PROGRESS NOTES
Pt was the solo participant in attendance for afternoon Music Therapy group today.  His demeanor was easy-going.  Pt listened to and discussed music and musicians, festivals pt wishes he could go to.  Pt was pleasant but does appear to lack insight.  Pt reports he sleeps a lot and misses morning groups.  30 minutes participation.  No charge.     Ayleen Perales, MT-BC  Board-Certified Music Therapist

## 2025-08-01 NOTE — PLAN OF CARE
Team Note Due:  Monday     Assessment/Intervention/Current Symtoms and Care Coordination:  Chart review    [facility] reviewing referral  [facility] reviewing referral    Pierre at [facility] requested records for possible admission to [facility]. I informed him we don't feel he needs this level of care.    I emailed commitment CM Sindi Montes with an update of IRTS referral statuses and asked if she would be willing to call his parents to see if he could potentially discharge there while awaiting IRTS pending his long stay in hospital, and waiting for an IRTS bed.     I met with pt to share that he could potentially discharge to parents if parents agree and if commitment CM agrees. I shared we are waiting to hear back as well as waiting to hear back from some IRTS placements still about timelines for placement.     Discharge Plan or Goal:  IRTS     Barriers to Discharge:  Symptoms     Referral Status:  7/30 [facility] - all locations  Referrals sent to all locations - currently being reviewed     7/30 [facility] - all locations  Reviewing referral    7/30 [facility]S - contact is: Ravi Sanders   - on the waitlist    7/30 [facility]  -reviewing referral    7/30 [facility] -   -wait list is 4 weeks     Legal Status:  MI commitment + Rios   [facility]: [facility]  File Number: [id number]   Start date: 7/11/25  Rios meds: Haldol, Prolixin, Clozaril, Risperdal, Zyprexa, Invega     PPS: Sindi Montes [phone]    Contacts (include MAURICE status):  Gilbert Serrato - Father (No MAURICE) Ph: [phone]      Upcoming Meetings and Dates/Important Information and next steps:  Coordinate care   Pt will need PD and COS at discharge.   Update internal referral tracker.  Pt will need psychiatry.     * Pt has meds in discharge pharmacy that he will need to get before discharge *

## 2025-08-02 PROCEDURE — 97150 GROUP THERAPEUTIC PROCEDURES: CPT | Mod: GO

## 2025-08-02 PROCEDURE — 250N000013 HC RX MED GY IP 250 OP 250 PS 637: Performed by: STUDENT IN AN ORGANIZED HEALTH CARE EDUCATION/TRAINING PROGRAM

## 2025-08-02 PROCEDURE — 124N000002 HC R&B MH UMMC

## 2025-08-02 PROCEDURE — 250N000013 HC RX MED GY IP 250 OP 250 PS 637: Performed by: PSYCHIATRY & NEUROLOGY

## 2025-08-02 RX ADMIN — HALOPERIDOL 5 MG: 5 TABLET ORAL at 08:00

## 2025-08-02 RX ADMIN — BENZTROPINE MESYLATE 0.5 MG: 0.5 TABLET ORAL at 19:46

## 2025-08-02 RX ADMIN — NICOTINE POLACRILEX 4 MG: 2 GUM, CHEWING ORAL at 09:27

## 2025-08-02 RX ADMIN — NICOTINE POLACRILEX 4 MG: 2 GUM, CHEWING ORAL at 12:48

## 2025-08-02 RX ADMIN — HALOPERIDOL 10 MG: 10 TABLET ORAL at 19:46

## 2025-08-02 RX ADMIN — BENZTROPINE MESYLATE 0.5 MG: 0.5 TABLET ORAL at 08:00

## 2025-08-02 ASSESSMENT — ACTIVITIES OF DAILY LIVING (ADL)
ADLS_ACUITY_SCORE: 23

## 2025-08-02 NOTE — PLAN OF CARE
NOC Shift Report    SLEPT:  6.5 hours overnight.      Safety rounds completed with no issues identified.    PAIN:  No c/o pain or discomfort.    PRNs:  NONE.    PRECAUTIONS:  NONE.    Goal Outcome Evaluation:  Problem: Sleep Disturbance  Goal: Adequate Sleep/Rest  Outcome: Progressing

## 2025-08-02 NOTE — PLAN OF CARE
"Calm, cooperative. Blunted affect. Appeared to isolate to room for virtually the entire shift. Denied all psychiatric symptoms. No delusional content noted this evening. No acute behavioral or safety concerns noted. Took scheduled medication without incident.     Denied pain, denied having any acute physical concerns at this time. No adverse effects of medication observed or reported.     /67 (BP Location: Right arm, Patient Position: Left side, Cuff Size: Adult Large)   Pulse 84   Temp 97.9  F (36.6  C) (Temporal)   Resp 16   Ht 1.803 m (5' 11\")   Wt 133.4 kg (294 lb 1.6 oz)   SpO2 95%   BMI 41.02 kg/m      "

## 2025-08-02 NOTE — CARE PLAN
Rehab Group    Start time: 1600  End time: 1645  Patient time total: 30 minutes    attended partial group     #3 attended   Group Type: occupational therapy   Group Topic Covered: balanced lifestyle, cognitive activities, coping skills, educational support, emotional regulation, healthy leisure time, self-care, self-esteem, and social skills       Group Session Detail:  Education and group discussion provided on the benefits of positive affirmations for mental health with hands on August Positive Affirmation Personal Calendar for organization/planning, concentration, how to change negative thoughts into positive, follow through, coping, mood stabilization, reality-based activity, healthy leisure exploration, building self-esteem, fostering hope for a sustainable recovery, positive self-care, and socialization.        Patient Response/Contribution:  socially appropriate and actively engaged       Patient Detail:  Pt was pleasant and social upon direct approach.  He worked steadily on his affirmation calendar, utilizing the affirmation cards that were supplied.  Pt declined to decorate his calendar, though he did complete an affirmation in each of the days of the month.  Pt left group early once he deemed his calendar to be completed.          [id number] OT Group (2 or more in attendance)      Patient Active Problem List   Diagnosis    Encopresis    Urinary incontinence    Sprain of medial collateral ligament of knee    Auditory hallucination    Schizophrenia (H)    Suicide attempt (H)    Chronic paranoid schizophrenia (H)    Psychophysiological insomnia    Delusional thoughts (H)    Mood changes

## 2025-08-02 NOTE — PLAN OF CARE
"Problem: Psychotic Signs/Symptoms  Goal: Optimal Cognitive Function (Psychotic Signs/Symptoms)  Outcome: Progressing  Intervention: Support and Promote Cognitive Ability  Reorientation Measures:   reorientation provided   clock in view  Communication Support Strategies:   active listening utilized   actively observed nonverbal cues   simple statements used     Patient is alert and oriented x3, calm and cooperative. Patient remains withdrawn and isolative to his room; only coming out for needs. When he is present in the milieu, he is guarded and minimally engaging upon approach. Engages making phone calls and watching tv in his room.  Presents as guarded; affect is flat and mood is \"good\"; normal speech and poor insight into his situation. Patient appears adequately groomed. Makes requests inappropriately; asked to have Zyprexa with no indication of agitation; denies internal irritability. Patient denies all MH symptoms including anxiety, depression, thoughts of  SI/SIB/HI, racing thoughts,hallucinations, and thoughts of paranoia. No acute behavioral concern with patient this shift.     Patient is eating, hydrating, and sleeping adequately. Patient is medication compliant with reminders. Medication side effects were not observed or reported this shift. Patient denies pain/ physical discomforts. No acute medical concern. VS WNL /75   Pulse 94   Temp 97.8  F (36.6  C) (Temporal)   Resp 16   Ht 1.803 m (5' 11\")   Wt 133.4 kg (294 lb 1.6 oz)   SpO2 96%   BMI 41.02 kg/m                "

## 2025-08-03 PROCEDURE — 250N000013 HC RX MED GY IP 250 OP 250 PS 637: Performed by: STUDENT IN AN ORGANIZED HEALTH CARE EDUCATION/TRAINING PROGRAM

## 2025-08-03 PROCEDURE — 250N000013 HC RX MED GY IP 250 OP 250 PS 637: Performed by: PSYCHIATRY & NEUROLOGY

## 2025-08-03 PROCEDURE — 124N000002 HC R&B MH UMMC

## 2025-08-03 RX ADMIN — NICOTINE POLACRILEX 4 MG: 2 GUM, CHEWING ORAL at 07:47

## 2025-08-03 RX ADMIN — NICOTINE POLACRILEX 4 MG: 2 GUM, CHEWING ORAL at 15:49

## 2025-08-03 RX ADMIN — BENZTROPINE MESYLATE 0.5 MG: 0.5 TABLET ORAL at 07:47

## 2025-08-03 RX ADMIN — BENZTROPINE MESYLATE 0.5 MG: 0.5 TABLET ORAL at 18:29

## 2025-08-03 RX ADMIN — HALOPERIDOL 10 MG: 10 TABLET ORAL at 18:29

## 2025-08-03 RX ADMIN — NICOTINE POLACRILEX 4 MG: 2 GUM, CHEWING ORAL at 11:54

## 2025-08-03 RX ADMIN — HALOPERIDOL 5 MG: 5 TABLET ORAL at 07:47

## 2025-08-03 ASSESSMENT — ACTIVITIES OF DAILY LIVING (ADL)
ADLS_ACUITY_SCORE: 23

## 2025-08-03 NOTE — PLAN OF CARE
Calm, cooperative. Isolating to room for virtually the entire shift, appeared to be sleeping for much of this. When broached topic of pt being sedated, pt did not engage beyond a grunted acknowledgement. When asked pt if his medications were perhaps sedating him, pt stated that his medication were not. Denied SI/HI, A/VH. Endorsed a neutral mood. Took scheduled medication without incident. No acute behavioral or safety concerns noted this evening.     Denied pain. Denied having any acute physical concerns at this time. No adverse effects of medication observed or reported.

## 2025-08-03 NOTE — PLAN OF CARE
NOC Shift Report    SLEPT:  7 hours overnight.      Safety rounds completed with no issues identified.    PAIN:  No c/o pain or discomfort.    PRNs:  NONE.    PRECAUTIONS:  NONE.    Goal Outcome Evaluation:  Problem: Sleep Disturbance  Goal: Adequate Sleep/Rest  Outcome: Progressing

## 2025-08-03 NOTE — PLAN OF CARE
"Problem: Psychotic Signs/Symptoms  Goal: Improved Mood Symptoms (Psychotic Signs/Symptoms)  Outcome: Progressing  Intervention: Optimize Emotion and Mood  Supportive Measures:   active listening utilized   self-care encouraged   verbalization of feelings encouraged  Diversional Activity:   television   play     Patient is alert and oriented x3, calm and cooperative. Patient remains withdrawn and isolative to his room; only coming out for needs. When he is present in the milieu, he is guarded and minimally engaging upon approach. Engages making phone calls and watching tv in his room.  Presents as guarded; affect is flat and mood is \"good\"; normal speech and poor insight into his situation. Patient appears well-groomed; showered this shift. Patient denies all MH symptoms including anxiety, depression, thoughts of  SI/SIB/HI, racing thoughts,hallucinations, and thoughts of paranoia. No acute behavioral concern with patient this shift.     Patient is eating, hydrating, and sleeping adequately. Patient is medication compliant with reminders. Medication side effects were not observed or reported this shift. Patient denies pain/ physical discomforts. No acute medical concern. VS WNL /71   Pulse 101   Temp 97.9  F (36.6  C) (Temporal)   Resp 16   Ht 1.803 m (5' 11\")   Wt 133.4 kg (294 lb 1.6 oz)   SpO2 98%   BMI 41.02 kg/m                "

## 2025-08-04 VITALS
RESPIRATION RATE: 18 BRPM | OXYGEN SATURATION: 98 % | DIASTOLIC BLOOD PRESSURE: 73 MMHG | HEIGHT: 71 IN | HEART RATE: 81 BPM | BODY MASS INDEX: 41.17 KG/M2 | TEMPERATURE: 98.6 F | SYSTOLIC BLOOD PRESSURE: 103 MMHG | WEIGHT: 294.1 LBS

## 2025-08-04 PROCEDURE — 250N000013 HC RX MED GY IP 250 OP 250 PS 637: Performed by: PSYCHIATRY & NEUROLOGY

## 2025-08-04 PROCEDURE — 250N000013 HC RX MED GY IP 250 OP 250 PS 637: Performed by: STUDENT IN AN ORGANIZED HEALTH CARE EDUCATION/TRAINING PROGRAM

## 2025-08-04 PROCEDURE — 99239 HOSP IP/OBS DSCHRG MGMT >30: CPT | Performed by: PSYCHIATRY & NEUROLOGY

## 2025-08-04 RX ADMIN — BENZTROPINE MESYLATE 0.5 MG: 0.5 TABLET ORAL at 08:11

## 2025-08-04 RX ADMIN — NICOTINE POLACRILEX 4 MG: 2 GUM, CHEWING ORAL at 11:57

## 2025-08-04 RX ADMIN — NICOTINE POLACRILEX 4 MG: 2 GUM, CHEWING ORAL at 15:07

## 2025-08-04 RX ADMIN — HALOPERIDOL 5 MG: 5 TABLET ORAL at 08:11

## 2025-08-04 ASSESSMENT — ACTIVITIES OF DAILY LIVING (ADL)
ADLS_ACUITY_SCORE: 23
LAUNDRY: UNABLE TO COMPLETE
ADLS_ACUITY_SCORE: 23
ADLS_ACUITY_SCORE: 23
ORAL_HYGIENE: INDEPENDENT
ADLS_ACUITY_SCORE: 23
ORAL_HYGIENE: INDEPENDENT
ADLS_ACUITY_SCORE: 23
HYGIENE/GROOMING: INDEPENDENT
ADLS_ACUITY_SCORE: 23
DRESS: INDEPENDENT
ADLS_ACUITY_SCORE: 23
HYGIENE/GROOMING: INDEPENDENT
DRESS: INDEPENDENT

## 2025-08-04 NOTE — PLAN OF CARE
Care Coordinator Note(s):    Care Request(s):   Psychiatry   Preferences: Time Frame: 1 Week, Any Appointment Type (In Person, Virtual, Telephone),   Notes: Pt saw this provider 2 years ago (was off meds for about 2 years). Hopefully he can get back into see this provider post -hospital follow up and to get his IM injection. His IM Invega is due 8/20 - 234mg. May discharge today or tomorrow.   [facility]   [address]   [place] [address] [phone]  Charis Santiago,        Therapy  Preferences: Time Frame: 1 Week, Any Appointment Type (In Person, Virtual, Telephone),   Notes: Pt needs therapy scheduled for after hospital support. May discharge today or tomorrow. [facility] [place] see above      Care Outcome(s):    CC Progress Note(s)/ Documentation:                         Appointment: {Appttype:057048}  Date/time:  @  {APTLOC:708691}  Provider:    Address:  Phone:  Fax:   Note:     Follow up:   @  {APTLOC:645177}    Appointment: {Appttype:412761}  Date/time:  @  {APTLOC:946113}  Provider:    Address:  Phone:  Fax:   Note:           Khadra Oliver  Adult Behavioral Health Care Coordinator

## 2025-08-04 NOTE — PLAN OF CARE
"Calm, cooperative. Denied all psychiatric symptoms. States that mood is \"fine.\" Alert, oriented x 4. Continues to isolate to room. No acute behavioral or safety concerns noted this evening. Took scheduled medication without incident.     Denies pain. Denies having any acute physical concerns at this time. No adverse effects of medication observed or reported.     /71   Pulse 66   Temp (!) 95.3  F (35.2  C) (Temporal)   Resp 16   Ht 1.803 m (5' 11\")   Wt 133.4 kg (294 lb 1.6 oz)   SpO2 96%   BMI 41.02 kg/m        "

## 2025-08-04 NOTE — PLAN OF CARE
"Problem: Psychotic Signs/Symptoms  Goal: Improved Behavioral Control (Psychotic Signs/Symptoms)  Outcome: Progressing  Patient is alert and oriented x3, calm and cooperative with vitals check and medication administration. His affect is bright and mood is calm. He was visible in the lounge area and he was somewhat social with peers. He did not complain of pain and prn nicotine gum was given. Patient is set to discharge home today. Medication are on the unit and discharge order is enter. Patient is overall calm with no aggresion or agitation. Patient is not talking about the police or FBI, and his mom called and told writer that patient is doing a lot better because she is able to hold a logical conversation with him. Patient's parent will pick him up once the paperwork is here.     /80 (BP Location: Left arm, Patient Position: Sitting, Cuff Size: Adult Large)   Pulse 94   Temp 97.9  F (36.6  C) (Oral)   Resp 18   Ht 1.803 m (5' 11\")   Wt 133.4 kg (294 lb 1.6 oz)   SpO2 96%   BMI 41.02 kg/m     "

## 2025-08-04 NOTE — PLAN OF CARE
Team Note Due:  Monday     Assessment/Intervention/Current Symtoms and Care Coordination:  Chart review and team.    Pt presents much better. Is ready for discharge.     I checked in with patient, he looks brighter. I shared I would speak with his mom and see if she is ok with him discharging home while awaiting IRTS placement. He was excited about this.     Pt's commitment CM is in agreement with him discharging home while awaiting IRTS.     I called Kathia López - mother (MAURICE signed) [phone]. She thinks Joo is doing much better 'at 75%'. She is excited to see him. She stated that she is willing to have him come home until he gets into IRTS. She will call to see if his dad can pick him up either today or tomorrow. Kathia said to call her back and let her know if Dr approves discharge today.     I updated commitment CM and sent her the provisional discharge. I also called her [phone] to let her know we are awaiting signed PD back. (Left voicemail). CM stated she would email it back shortly when she got into the office.     Requested psych, therapy and PCP at discharge from care coordinators and informed unit therapist that pt may discharge today or tomorrow.     Behavioral team note complete.     I called mom to share that pt can discharge today - likely this evening. I shared I can call her whenever I get the signed PD back. (Left voicemail).     I sent email to Pt's CM again asking for her to please send back signed PD as this is what we are waiting for prior to discharge.     I reviewed PD with patient and he signed this and was in agreement. I shared that we are still in the process of making his appts and waiting for his CM to sign the PD.     Pt's PCP:      There was a note in the chart that pt has home meds in discharge pharmacy and the [facility] called [facility], [facility], various places and no record of this.     I called CM work cell [phone] and left a voicemail asking for the signed  PD preferably before end of day.     Commitment CM sent back signed PD. I gave Pt a copy of this.    [facility] phone lines are down.  [facility] wont accept him back for two years.  Writer will call tomorrow to book psych and invega appt and call Leanna parents.    Discharge Plan or Goal:  Home while awaiting IRTS  [address] [place], [address]     Barriers to Discharge:     Referral Status:  7/30 [facility] - all locations  Referrals sent to all locations - currently being reviewed     7/30 [facility] - all locations  Reviewing referral    7/30 [facility] I[facility]S - contact is: Ravi Sanders   -5th on the waitlist    7/30 [facility] IRTS  -reviewing referral    7/30 [facility] IRTS -   -wait list is 4 weeks     Legal Status:  MI commitment + Rios   [facility]: [facility]  File Number: [id number]   Start date: 7/11/25  Rios meds: Haldol, Prolixin, Clozaril, Risperdal, Zyprexa, Invega     PPS: Sindi Montes [phone]    Contacts (include MAURICE status):  Gilbert Serrato - Father (No MAURICE) Ph: [phone]   Kathia Rene - mother (MAURICE signed) [phone]     Upcoming Meetings and Dates/Important Information and next steps:  Update internal referral tracker.

## 2025-08-04 NOTE — PLAN OF CARE
I called at 8:30am to book psych appointment at [facility] as yesterday all [facility] phones were down. Writer and care coordinators could not reach any of there offices yesterday. I informed patient that I would book first thing this morning and call him today with the appointment times. Per his record, [facility] stated that he could not book for 2 years due to missed appts.    Psychiatry appointment:  September 2nd 2025 10AM with Rosamaria Whittaker in [place] [facility] in person  [facility]  [address] [place] [address] [address]  [place] [address]  Ph: [phone]  Fax: [phone]    [facility]  [address] [place] [address]  Ph: [phone]  [facility] [place] [facility] Ph: [phone]    Yosef transferred me to the psych clinic.   Latricia at [facility] shared they are booking out til October. Transferred me to [facility] again.   Johny at [facility] shared we would need a referral from a [facility] source otherwise booking until November. Can check in net work community providers.     Tuesday August 12th at 10am  [facility]  Joo to call Ph: [phone], press option #1 to finish intake.   I called the office to share his Invega is due on August 20th - Spoke to Landon.   I will fax the discharge summary to Fax: [phone]    Psychiatry appointment:  September 2nd 2025 10AM with Rosamaria Whittaker in [place] [facility] in person  [facility]  [address] [place] [address] [address]  [place] [address]  Ph: [phone]  Fax: [phone]    I called and spoke to both Joo and his mother Kathia and they wrote down the appointment information.  I also sent this to his commitment  Sindi Montes.

## 2025-08-04 NOTE — PLAN OF CARE
Pt is discharging to parents imminently to await IRTS placement. Pt's medication and AVS reviewed.     Calm, cooperative. No overtly psychotic or manic symptoms noted. Denies having any thoughts of harming self or others. Denied access to firearm. Denies A/VH. Future-oriented. Speech is linear.     Denied pain, denied having any acute physical concerns at this time.

## 2025-08-04 NOTE — PLAN OF CARE
NOC Shift Report    SLEPT:  6.75 hours overnight.      Safety rounds completed with no issues identified.    PAIN:  No c/o pain or discomfort.    PRNs:  NONE.    PRECAUTIONS:  NONE.    Goal Outcome Evaluation:  Problem: Psychotic Signs/Symptoms  Goal: Improved Sleep (Psychotic Signs/Symptoms)  Outcome: Progressing

## 2025-08-04 NOTE — PROVIDER NOTIFICATION
08/04/25 1115   Individualization/Patient Specific Goals   Patient Personal Strengths family/social support   Patient Vulnerabilities history of unsuccessful treatment;lacks insight into illness;housing insecurity;family/relationship conflict;adverse childhood experience(s)   Interprofessional Rounds   Participants nursing;CTC;psychiatrist   Behavioral Team Discussion   Participants Dr Migel MD, Ara COVARRUBIAS, Dilcia BARRERA RN Manager, Maico Sawant, Sonido Allen RN, Haily MA RN   Progress Pt has much improved. He is at his psychiatric baseline. He is able to discharge. Checked with commitment CM who is ok with pt discharging home while awaiting IRTS referral placement. Pt may discharge today or tomorrow.   Medical/Physical See H&P   Precautions Suicide   Plan Pt may discharge home to parents today or tomorrow.   Safety Plan Unit therapist will complete.   Anticipated Discharge Disposition IRTS     Goal Outcome Evaluation:  PRECAUTIONS AND SAFETY    Behavioral Orders   Procedures    Code 1 - Restrict to Unit    Routine Programming     As clinically indicated    Status 15     Every 15 minutes.       Safety  Safety WDL: WDL  Patient Location: patient room, own, lounge, hallway  Observed Behavior: walking, sitting, calm  Observed Behavior (Comment): sitting in the lounge  Safety Measures: safety rounds completed  Diversional Activity: television, play  De-Escalation Techniques: stimulation decreased  Suicidality: Status 15